# Patient Record
Sex: FEMALE | Race: WHITE | Employment: OTHER | URBAN - METROPOLITAN AREA
[De-identification: names, ages, dates, MRNs, and addresses within clinical notes are randomized per-mention and may not be internally consistent; named-entity substitution may affect disease eponyms.]

---

## 2017-01-11 ENCOUNTER — CLINICAL SUPPORT (OUTPATIENT)
Dept: CARDIOLOGY CLINIC | Age: 77
End: 2017-01-11

## 2017-01-11 VITALS
OXYGEN SATURATION: 98 % | HEART RATE: 87 BPM | BODY MASS INDEX: 31.54 KG/M2 | SYSTOLIC BLOOD PRESSURE: 120 MMHG | WEIGHT: 140.2 LBS | HEIGHT: 56 IN | DIASTOLIC BLOOD PRESSURE: 60 MMHG

## 2017-01-11 DIAGNOSIS — I10 ESSENTIAL HYPERTENSION: Primary | ICD-10-CM

## 2017-01-16 ENCOUNTER — TELEPHONE (OUTPATIENT)
Dept: CARDIOLOGY CLINIC | Age: 77
End: 2017-01-16

## 2017-01-16 NOTE — TELEPHONE ENCOUNTER
Pt states she seems to be ok off the atenolol. Says her BP is up and down. 150/78 one day and 120/60 the next. Says she feels a little de-energized but is unsure how that is related.

## 2017-01-16 NOTE — TELEPHONE ENCOUNTER
----- Message from Ju Bradley MD sent at 1/11/2017 12:47 PM EST -----  Regarding: bp  Genesis Avina - can you please call and see if she is feeling better off of atenolol. We stopped it a few weeks ago and wanted to see if she felt better off of it. Her BP is OK and we'll keep things as they currently are.      Thanks,  SK

## 2017-01-18 NOTE — TELEPHONE ENCOUNTER
Can you please have her keep a daily log of BP readings (morning and night) and associated symptoms and send me those results in one month.      Thanks,   SK

## 2017-02-01 ENCOUNTER — HOSPITAL ENCOUNTER (OUTPATIENT)
Dept: VASCULAR SURGERY | Age: 77
Discharge: HOME | End: 2017-02-01
Attending: FAMILY MEDICINE
Payer: MEDICARE

## 2017-02-01 DIAGNOSIS — I25.10 CAD (CORONARY ARTERY DISEASE): ICD-10-CM

## 2017-02-01 PROCEDURE — 93880 EXTRACRANIAL BILAT STUDY: CPT

## 2017-02-01 NOTE — PROCEDURES
[facility]  *** FINAL REPORT ***    Name: Myrna Webb  MRN: [id number]    Outpatient  : 1940  HIS Order #: [id number]  [address] [facility] Visit #: [id number]  Date: 2017    TYPE OF TEST: Cerebrovascular Duplex    REASON FOR TEST  General health evaluation, Family history of stroke    Right Carotid:-             Proximal               Mid                 Distal  cm/s  Systolic  Diastolic  Systolic  Diastolic  Systolic  Diastolic  CCA:    704.9      15.3                            73.3      16.3  Bulb:  ECA:    135.2       9.1  ICA:    113.4      15.0      103.5      22.8      129.1      26.8  ICA/CCA:  1.5       0.9    ICA Stenosis: <50%    Right Vertebral:-  Finding: Antegrade  Sys:       39.8  Rocio:       10.1    Right Subclavian:    Left Carotid:-            Proximal                Mid                 Distal  cm/s  Systolic  Diastolic  Systolic  Diastolic  Systolic  Diastolic  CCA:    199.4      20.4                            70.7      16.3  Bulb:  ECA:    123.4      13.0  ICA:    115.4      15.0       80.1      20.4       74.5      20.2  ICA/CCA:  1.6       0.9    ICA Stenosis: <50%    Left Vertebral:-  Finding: Antegrade  Sys:       46.1  Rocio:       15.0    Left Subclavian:    INTERPRETATION/FINDINGS  PROCEDURE:  Evaluation of the extracranial cerebrovascular arteries  with ultrasound (B-mode imaging, pulsed Doppler, color Doppler). Includes the common carotid, internal carotid, external carotid, and  vertebral arteries. FINDINGS: Intimal thickening observed in the CCA bilaterally. Calcific   plaque noted bilaterally in the bulb and the left distal CCA. Also,  heterogeneous plaque noted in the right distal CCA and ICA. Tortuous  ICA noted bilaterally. IMPRESSION: Findings are consistent with 0-49% stenosis of the right  internal carotid and 0-49% stenosis of the left internal carotid. Vertebrals are patent with antegrade flow.     ADDITIONAL COMMENTS    I have personally reviewed the data relevant to the interpretation of  this  study. TECHNOLOGIST: Gypsy Peter RDCS  Signed: 02/01/2017 05:32 PM    PHYSICIAN: Diogenes Rivera.  Karina Kilpatrick MD  Signed: 02/02/2017 08:24 AM

## 2017-03-02 ENCOUNTER — TELEPHONE (OUTPATIENT)
Dept: CARDIOLOGY CLINIC | Age: 77
End: 2017-03-02

## 2017-03-02 NOTE — TELEPHONE ENCOUNTER
----- Message from Teresa Goodman MD sent at 2/28/2017 10:48 PM EST -----  Regarding: please call  Can you please let patient know that home BP readings look good.    Thanks

## 2017-03-20 ENCOUNTER — OFFICE VISIT (OUTPATIENT)
Dept: CARDIOLOGY CLINIC | Age: 77
End: 2017-03-20

## 2017-03-20 VITALS
HEIGHT: 56 IN | SYSTOLIC BLOOD PRESSURE: 140 MMHG | BODY MASS INDEX: 31.27 KG/M2 | DIASTOLIC BLOOD PRESSURE: 82 MMHG | HEART RATE: 62 BPM | WEIGHT: 139 LBS

## 2017-03-20 DIAGNOSIS — E11.9 CONTROLLED TYPE 2 DIABETES MELLITUS WITHOUT COMPLICATION, WITHOUT LONG-TERM CURRENT USE OF INSULIN (HCC): ICD-10-CM

## 2017-03-20 DIAGNOSIS — I25.10 CORONARY ARTERY DISEASE INVOLVING NATIVE CORONARY ARTERY OF NATIVE HEART WITHOUT ANGINA PECTORIS: Primary | ICD-10-CM

## 2017-03-20 DIAGNOSIS — I10 ESSENTIAL HYPERTENSION: ICD-10-CM

## 2017-03-20 DIAGNOSIS — E78.5 HYPERLIPIDEMIA, UNSPECIFIED HYPERLIPIDEMIA TYPE: ICD-10-CM

## 2017-03-20 DIAGNOSIS — R53.82 CHRONIC FATIGUE: ICD-10-CM

## 2017-03-20 DIAGNOSIS — R73.03 BORDERLINE DIABETES MELLITUS: ICD-10-CM

## 2017-03-20 RX ORDER — CARVEDILOL 3.12 MG/1
3.12 TABLET ORAL 2 TIMES DAILY WITH MEALS
Qty: 60 TAB | Refills: 12 | Status: SHIPPED | OUTPATIENT
Start: 2017-03-20 | End: 2018-03-25 | Stop reason: SDUPTHER

## 2017-03-20 RX ORDER — ROSUVASTATIN CALCIUM 20 MG/1
20 TABLET, COATED ORAL
Qty: 30 TAB | Refills: 12 | Status: SHIPPED | OUTPATIENT
Start: 2017-03-20 | End: 2018-04-02 | Stop reason: SDUPTHER

## 2017-03-20 NOTE — PATIENT INSTRUCTIONS

## 2017-03-20 NOTE — MR AVS SNAPSHOT
Visit Information Date & Time Provider Department Dept. Phone Encounter #  
 3/20/2017  4:00 PM Celso Akers MD [facility] Sarah Pastor [phone] [id number] Your Appointments 4/4/2017  8:00 AM  
ECHO CARDIOGRAMS 2D with CHRIS WILLIAMSON  
[facility] ([facility]) Appt Note: 1 day nuc ht 4'10 wt 139 echo at 8am dr Jeanette Cardenas dx cp sll [address] [facility] [address] [facility]  
[address] [facility] [address]  
  
    
 4/4/2017  9:00 AM  
NUCLEAR MEDICINE with NUCLEAR [facility]  
[facility] ([facility] SCHEDULING) Appt Note: 1 day nuc ht 4'10 wt 139 echo at 8am dr Jeanette Cardenas dx cp sll [address] [facility] [address] [facility]  
[address] [address] [address] Upcoming Health Maintenance Date Due DTaP/Tdap/Td series (1 - Tdap) 7/4/1962 ZOSTER VACCINE AGE 60> 7/4/2001 GLAUCOMA SCREENING Q2Y 7/4/2006 OSTEOPOROSIS SCREENING (DEXA) 7/4/2006 Pneumococcal 65+ Low/Medium Risk (1 of 2 - PCV13) 7/4/2006 MEDICARE YEARLY EXAM 7/4/2006 INFLUENZA AGE 9 TO ADULT 8/1/2016 Allergies as of 3/20/2017  Review Complete On: 3/20/2017 By: Celso Akers MD  
  
 Severity Noted Reaction Type Reactions Darvon [Propoxyphene]  04/28/2014    Nausea Only Current Immunizations  Never Reviewed No immunizations on file. Not reviewed this visit You Were Diagnosed With   
  
 Codes Comments Coronary artery disease involving native coronary artery of native heart without angina pectoris    -  Primary ICD-10-CM: I25.10 ICD-9-CM: 414.01 Controlled type 2 diabetes mellitus without complication, without long-term current use of insulin ([facility]     ICD-10-CM: E11.9 ICD-9-CM: 250.00 Borderline diabetes mellitus     ICD-10-CM: R73.03 
ICD-9-CM: 790.29 Essential hypertension     ICD-10-CM: I10 
ICD-9-CM: 401.9 Hyperlipidemia, unspecified hyperlipidemia type     ICD-10-CM: E78.5 ICD-9-CM: 272.4 Chronic fatigue     ICD-10-CM: R53.82 
ICD-9-CM: 780.79 Vitals BP Pulse Height(growth percentile) Weight(growth percentile) BMI OB Status 140/82 62 4' 8\" (1.422 m) 139 lb (63 kg) 31.16 kg/m2 Hysterectomy Smoking Status Never Smoker Vitals History BMI and BSA Data Body Mass Index Body Surface Area  
 31.16 kg/m 2 1.58 m 2 Preferred Pharmacy Pharmacy Name Phone [id number] [place], [address] [phone] Your Updated Medication List  
  
   
This list is accurate as of: 3/20/17  4:56 PM.  Always use your most recent med list.  
  
  
  
  
 alendronate 70 mg tablet Commonly known as:  FOSAMAX Take  by mouth every Sunday. ALEVE 220 mg Cap Generic drug:  naproxen sodium Take  by mouth as needed. ALLEGRA 180 mg tablet Generic drug:  fexofenadine Take  by mouth daily. aspirin 81 mg chewable tablet Take 1 Tab by mouth daily. carvedilol 3.125 mg tablet Commonly known as:  Ozie Bitter Take 1 Tab by mouth two (2) times daily (with meals). CENTRUM SILVER Tab tablet Generic drug:  multivitamins-minerals-lutein Take 1 Tab by mouth daily. cyclobenzaprine 10 mg tablet Commonly known as:  FLEXERIL Take  by mouth as needed. loperamide 1 mg/5 mL solution Commonly known as:  IMODIUM Take  by mouth as needed for Diarrhea. NIFEdipine ER 90 mg ER tablet Commonly known as:  ADALAT CC Take 90 mg by mouth daily. rosuvastatin 20 mg tablet Commonly known as:  CRESTOR Take 1 Tab by mouth nightly. triamterene-hydroCHLOROthiazide 37.5-25 mg per capsule Commonly known as:  Marcina Majors Take  by mouth daily. VITAMIN D3 1,000 unit Cap Generic drug:  cholecalciferol Take  by mouth daily. Prescriptions Sent to Pharmacy Refills  
 rosuvastatin (CRESTOR) 20 mg tablet 12 Sig: Take 1 Tab by mouth nightly. Class: Normal  
 Pharmacy: [facility] [place] Ph #: [phone] Route: Oral  
 carvedilol (COREG) 3.125 mg tablet 12 Sig: Take 1 Tab by mouth two (2) times daily (with meals). Class: Normal  
 Pharmacy: [facility] [address] [place] Ph #: [phone] Route: Oral  
  
We Performed the Following HEMOGLOBIN A1C WITH EAG [47103 CPT(R)] METABOLIC PANEL, COMPREHENSIVE [17792 CPT(R)] NMR LIPOPROFILE [id number] CPT(R)] TSH 3RD GENERATION [20476 CPT(R)] Patient Instructions Heart-Healthy Diet: Care Instructions Your Care Instructions A heart-healthy diet has lots of vegetables, fruits, nuts, beans, and whole grains, and is low in salt. It limits foods that are high in saturated fat, such as meats, cheeses, and fried foods. It may be hard to change your diet, but even small changes can lower your risk of heart attack and heart disease. Follow-up care is a key part of your treatment and safety. Be sure to make and go to all appointments, and call your doctor if you are having problems. It's also a good idea to know your test results and keep a list of the medicines you take. How can you care for yourself at home? Watch your portions · Learn what a serving is. A \"serving\" and a \"portion\" are not always the same thing. Make sure that you are not eating larger portions than are recommended. For example, a serving of pasta is ½ cup. A serving size of meat is 2 to 3 ounces. A 3-ounce serving is about the size of a deck of cards. Measure serving sizes until you are good at [place]" them. Keep in mind that restaurants often serve portions that are 2 or 3 times the size of one serving.  
· To keep your energy level up and keep you from feeling hungry, eat often but in smaller portions. · Eat only the number of calories you need to stay at a healthy weight. If you need to lose weight, eat fewer calories than your body burns (through exercise and other physical activity). Eat more fruits and vegetables · Eat a variety of fruit and vegetables every day. Dark green, deep orange, red, or yellow fruits and vegetables are especially good for you. Examples include spinach, carrots, peaches, and berries. · Keep carrots, celery, and other veggies handy for snacks. Buy fruit that is in season and store it where you can see it so that you will be tempted to eat it. · Cook dishes that have a lot of veggies in them, such as stir-fries and soups. Limit saturated and trans fat · Read food labels, and try to avoid saturated and trans fats. They increase your risk of heart disease. Trans fat is found in many processed foods such as cookies and crackers. · Use olive or canola oil when you cook. Try cholesterol-lowering spreads, such as Benecol or Take Control. · Bake, broil, grill, or steam foods instead of frying them. · Choose lean meats instead of high-fat meats such as hot dogs and sausages. Cut off all visible fat when you prepare meat. · Eat fish, skinless poultry, and meat alternatives such as soy products instead of high-fat meats. Soy products, such as tofu, may be especially good for your heart. · Choose low-fat or fat-free milk and dairy products. Eat fish · Eat at least two servings of fish a week. Certain fish, such as salmon and tuna, contain omega-3 fatty acids, which may help reduce your risk of heart attack. Eat foods high in fiber · Eat a variety of grain products every day. Include whole-grain foods that have lots of fiber and nutrients. Examples of whole-grain foods include oats, whole wheat bread, and brown rice. · Buy whole-grain breads and cereals, instead of white bread or pastries. Limit salt and sodium · Limit how much salt and sodium you eat to help lower your blood pressure. · Taste food before you salt it. Add only a little salt when you think you need it. With time, your taste buds will adjust to less salt. · Eat fewer snack items, fast foods, and other high-salt, processed foods. Check food labels for the amount of sodium in packaged foods. · Choose low-sodium versions of canned goods (such as soups, vegetables, and beans). Limit sugar · Limit drinks and foods with added sugar. These include candy, desserts, and soda pop. Limit alcohol · Limit alcohol to no more than 2 drinks a day for men and 1 drink a day for women. Too much alcohol can cause health problems. When should you call for help? Watch closely for changes in your health, and be sure to contact your doctor if: 
· You would like help planning heart-healthy meals. Where can you learn more? Go to http://sandra-darion.info/. Enter V137 in the search box to learn more about \"Heart-Healthy Diet: Care Instructions. \" Current as of: January 27, 2016 Content Version: 11.1 © 5784-4944 [employer]. Care instructions adapted under license by [employer] (which disclaims liability or warranty for this information). If you have questions about a medical condition or this instruction, always ask your healthcare professional. [address] any warranty or liability for your use of this information. Introducing [facility] [place] [facility]! Dear Esme Perez: Thank you for requesting a [employer] account. Our records indicate that you already have an active [employer] account. You can access your account anytime at [email or web address] Beyond [email or web address]/[employer] Did you know that you can access your hospital and ER discharge instructions at any time in [employer]? You can also review all of your test results from your hospital stay or ER visit. Additional Information If you have questions, please visit the Frequently Asked Questions section of the [employer] website at [email or web address] ([email or web address] Remember, [employer] is NOT to be used for urgent needs. For medical emergencies, dial 911. Now available from your iPhone and Android! Please provide this summary of care documentation to your next provider. Your primary care clinician is listed as Josie Bland. If you have any questions after today's visit, please call [phone].

## 2017-03-20 NOTE — PROGRESS NOTES
Foreign Bullard MD. [facility] [place]              Patient: Guillaume Agrawal  : 1940      Today's Date: 3/20/2017          HISTORY OF PRESENT ILLNESS:     History of Present Illness:  Ms. Clifton Banuelos is here for follow-up. She says energy level is not good - difficult to do much in the day. Has had some dull feeling substernal past few weeks - just there all day long past few weeks - annoying feeling - not any worse walking. Has HIGHTOWER going up stairs. PAST MEDICAL HISTORY:     Past Medical History:   Diagnosis Date    Arthritis     Borderline diabetes mellitus     CAD (coronary artery disease)     CT Heart Scan 14 - CAC Score 534, (80th%)    Diverticulosis     Dyslipidemia     GERD (gastroesophageal reflux disease)     HTN (hypertension)     Obesity     S/P hip replacement     Sleep apnea     on CPAP         Past Surgical History:   Procedure Laterality Date    HX KNEE REPLACEMENT      left, 2013    HX OTHER SURGICAL      CT Heart Scan 14 - CAC Score 534, (80th%). Prominent lymph nodes.  HX OTHER SURGICAL      Exercise cardiolite (14) - walked 3:15 (4.6 METS) - no ischemic EKG changes. Normal MPI (+ breast attenuation). LVEF 70%. MEDICATIONS:     Current Outpatient Prescriptions   Medication Sig Dispense Refill    pravastatin (PRAVACHOL) 40 mg tablet Take 1 Tab by mouth nightly. 30 Tab 12    cyclobenzaprine (FLEXERIL) 10 mg tablet Take  by mouth as needed.  alendronate (FOSAMAX) 70 mg tablet Take  by mouth every .  naproxen sodium (ALEVE) 220 mg cap Take  by mouth as needed.  multivitamins-minerals-lutein (CENTRUM SILVER) tab tablet Take 1 Tab by mouth daily.  loperamide (IMODIUM) 1 mg/5 mL solution Take  by mouth as needed for Diarrhea.  Cholecalciferol, Vitamin D3, (VITAMIN D3) 1,000 unit cap Take  by mouth daily.  NIFEdipine ER (ADALAT CC) 90 mg ER tablet Take 90 mg by mouth daily.       fexofenadine (ALLEGRA) 180 mg tablet Take  by mouth daily.  triamterene-hydrochlorothiazide (DYAZIDE) 37.5-25 mg per capsule Take  by mouth daily.  aspirin 81 mg chewable tablet Take 1 Tab by mouth daily. 30 Tab 11       Allergies   Allergen Reactions    Darvon [Propoxyphene] Nausea Only             SOCIAL HISTORY:     Social History   Substance Use Topics    Smoking status: Never Smoker    Smokeless tobacco: None    Alcohol use Yes      Comment: 1 glass of wine daily            FAMILY HISTORY:     Family History   Problem Relation Age of Onset    Stroke Mother     COPD Father              REVIEW OF SYSTEMS:        Review of Systems:    Constitutional: Negative for fever, chills    HEENT: Negative for vision changes.    Respiratory: Negative for cough    Cardiovascular: Negative for orthopnea, syncope, and PND.    Gastrointestinal: Negative for abdominal pain or melena    Genitourinary: Negative for dysuria    Musculoskeletal: Negative for myalgias.    Skin: Negative for rash    Heme: No problems bleeding.    Neurological: Negative for speech change and focal weakness.    occ diarrhea   + night sweats                     PHYSICAL EXAM:      Physical Exam:  Visit Vitals    /82    Pulse 62    Ht 4' 8\" (1.422 m)    Wt 139 lb (63 kg)    BMI 31.16 kg/m2       Patient appears generally well, mood and affect are appropriate and pleasant. HEENT: Hearing intact, non-icteric, normocephalic, atraumatic. Neck Exam: Supple, No JVD or carotid bruits. Lung Exam: Clear to auscultation, even breath sounds. Cardiac Exam: Regular rate and rhythm with no murmur  Abdomen: Soft, non-tender, normal bowel sounds. No bruits or masses. Extremities: Moves all ext well. No lower extremity edema. Vascular: 2+ dorsalis pedis pulses bilaterally.   Psych: Appropriate affect  Neuro - Grossly intact             LABS / OTHER STUDIES:        Labs 6/29/15 - chol 121, LDL 58, , HDL 43, LDL-P 1045, ApoB 54, CRP 4.6, proBNP 48, Insulin 22, glc 107, A1c 6.5, Cr 0.8, K 4.2, LFT's OK, ASA works 460  Labs 12/16/15 - chol 122, LDL 57, HDL 50, TG 85, Apo B 51, LDL-P 794, CRP 3.5, proBNP 44, A1c 6.1, CMP OK  Labs 7/27/16 - CMP OK (glc 143), TSH 2.1, A1c 6.5, CBC OK, chol 155, HDL 47, LDL 79,   Labs 1/31/17 - CBC OK, TSH 2.2,     Lab Results   Component Value Date/Time    Sodium 142 03/06/2017 09:06 AM    Potassium 3.8 03/06/2017 09:06 AM    Chloride 99 03/06/2017 09:06 AM    CO2 26 03/06/2017 09:06 AM    Anion gap 12 12/15/2015 09:20 AM    Glucose 130 03/06/2017 09:06 AM    BUN 18 03/06/2017 09:06 AM    Creatinine 0.75 03/06/2017 09:06 AM    BUN/Creatinine ratio 24 03/06/2017 09:06 AM    GFR est AA 90 03/06/2017 09:06 AM    GFR est non-AA 78 03/06/2017 09:06 AM    Calcium 9.7 03/06/2017 09:06 AM    Bilirubin, total 0.7 03/06/2017 09:06 AM    AST (SGOT) 15 03/06/2017 09:06 AM    Alk. phosphatase 68 03/06/2017 09:06 AM    Protein, total 7.3 03/06/2017 09:06 AM    Albumin 4.7 03/06/2017 09:06 AM    A-G Ratio 1.8 03/06/2017 09:06 AM    ALT (SGPT) 20 03/06/2017 09:06 AM     Component      Latest Ref Rng & Units 3/6/2017 12/16/2016           9:06 AM  8:26 AM   LDL-P      <1000 nmol/L 1130 (H) 1216 (H)   LDL-C      0 - 99 mg/dL 80 83   HDL-C      >39 mg/dL 60 49   Triglycerides      0 - 149 mg/dL 139 123   Cholesterol, total      100 - 199 mg/dL 168 157   HDL-P (Total)      >=30.5 umol/L 39.0 35.4   Small LDL-P      <=527 nmol/L 603 (H) 837 (H)   LDL size      >20.5 nm 20.5 20.0   LP-IR SCORE      <=45 70 (H) 74 (H)           CARDIAC DIAGNOSTICS:      Cardiac Evaluation Includes:  EKG 4/16/14 - NSR, normal    EKG 6/29/15 - NSR, normal   EKG 12/21/16 - NSR, normal           Carotid Dopplers 2/1/17 - mild plaque - 0-49% stenosis bilat           ASSESSMENT AND PLAN:        Assessment and Plan:    1) CAD    - CT Heart Scan 4/17/14 shows CAC Score 534, (80th%)    - Exercise cardiolite (5/19/14) - walked 3:15 (4.6 METS) - no ischemic EKG changes.  Normal MPI (+ breast attenuation). LVEF 70%.    - Ms. Kamaljit Driscoll has denies chest pain.    - For prevention of CV events, continue ASA 81.   - work on a healthy diet and weight loss   - given chest pain and fatigue, recheck an exercise cardiolite (walking limited by back and hip problems so likely will need to switch to [facility]) and echo       2) SHEREE on CPAP      3) Dyslipidemia -    - prior LDL-P and ApoB were OK on atorvastatin 20 mg. Was switched due pravastatin due to [place] [facility] issues\"   - LFT's OK lately   - LDL-P borderline higher than goal ---> Switch to Crestor 20 mg daily       4) HTN  - BP borderline high at home  - she feels tired so stopped atenolol, but this did not help any.   - will start Coreg (start at 3.125 mg BID and titrate up)      5) Pre-Diabetes  - Hgb A1c was 6.5 on 6/4/15  - A1c 6.1 on 12/17/15  - A1c 6.7 recently - management per PCP       6) RTC in 6 weeks. Patient expressed understanding of the plan - questions were answered.          Ami Mims MD, [place] [address] [place] [address], [address]  [address], [address] [place] [address]  Ph: [phone]   Ph [phone]                ADDENDUM   4/4/2017  Exercise Cardiolite 4/4/17 - walked 3:16 (4.6 METS). No CP or ischemic EKG changes. Normal MPI. LVEF 69%  Echo 4/4/17 - mild LVH. LVEF 55-60%    Will call patient with essentially normal results. ADDENDUM   4/5/2017  I called patient with results. She is doing well she says.

## 2017-04-03 ENCOUNTER — TELEPHONE (OUTPATIENT)
Dept: CARDIOLOGY CLINIC | Age: 77
End: 2017-04-03

## 2017-04-03 NOTE — TELEPHONE ENCOUNTER
LM to call back [phone]. WHen pt calls back please tell her  8am Nuclear stress test on 4/4/17, 8am echo. Informed pt no caffiene after 8pm  on 4/3/17, NPO [id number] on 4/4/17, and DO NOT take coreg tonight or in morning. Encouraged pt to wear comfortable clothes. Tests last 4-5 hours.

## 2017-04-04 ENCOUNTER — CLINICAL SUPPORT (OUTPATIENT)
Dept: CARDIOLOGY CLINIC | Age: 77
End: 2017-04-04

## 2017-04-04 DIAGNOSIS — I25.10 CORONARY ARTERY DISEASE INVOLVING NATIVE CORONARY ARTERY OF NATIVE HEART WITHOUT ANGINA PECTORIS: Primary | ICD-10-CM

## 2017-04-04 DIAGNOSIS — I10 ESSENTIAL HYPERTENSION: ICD-10-CM

## 2017-04-04 DIAGNOSIS — E78.5 HYPERLIPIDEMIA, UNSPECIFIED HYPERLIPIDEMIA TYPE: ICD-10-CM

## 2017-04-04 DIAGNOSIS — I25.10 CORONARY ARTERY DISEASE INVOLVING NATIVE CORONARY ARTERY OF NATIVE HEART WITHOUT ANGINA PECTORIS: ICD-10-CM

## 2017-04-04 NOTE — PROGRESS NOTES
See scanned report.  ordered and Dr. Yeison Palma read study. ID verified per protocol. Pt  reported no symptoms at completion of protocol.

## 2017-04-19 ENCOUNTER — HOSPITAL ENCOUNTER (OUTPATIENT)
Dept: LAB | Age: 77
Discharge: HOME | End: 2017-04-19
Payer: MEDICARE

## 2017-04-19 PROCEDURE — 80053 COMPREHEN METABOLIC PANEL: CPT

## 2017-04-19 PROCEDURE — 36415 COLL VENOUS BLD VENIPUNCTURE: CPT

## 2017-04-19 PROCEDURE — 83704 LIPOPROTEIN BLD QUAN PART: CPT

## 2017-04-19 PROCEDURE — 84443 ASSAY THYROID STIM HORMONE: CPT

## 2017-04-19 PROCEDURE — 83036 HEMOGLOBIN GLYCOSYLATED A1C: CPT

## 2017-04-21 LAB
ALBUMIN SERPL-MCNC: 4.1 G/DL (ref 3.5–4.8)
ALBUMIN/GLOB SERPL: 1.6 {RATIO} (ref 1.2–2.2)
ALP SERPL-CCNC: 59 IU/L (ref 39–117)
ALT SERPL-CCNC: 18 IU/L (ref 0–32)
AST SERPL-CCNC: 15 IU/L (ref 0–40)
BILIRUB SERPL-MCNC: 0.5 MG/DL (ref 0–1.2)
BUN SERPL-MCNC: 18 MG/DL (ref 8–27)
BUN/CREAT SERPL: 20 (ref 12–28)
CALCIUM SERPL-MCNC: 9.3 MG/DL (ref 8.7–10.3)
CHLORIDE SERPL-SCNC: 98 MMOL/L (ref 96–106)
CHOLEST SERPL-MCNC: 107 MG/DL (ref 100–199)
CO2 SERPL-SCNC: 28 MMOL/L (ref 18–29)
CREAT SERPL-MCNC: 0.9 MG/DL (ref 0.57–1)
EST. AVERAGE GLUCOSE BLD GHB EST-MCNC: 151 MG/DL
GLOBULIN SER CALC-MCNC: 2.5 G/DL (ref 1.5–4.5)
GLUCOSE SERPL-MCNC: 123 MG/DL (ref 65–99)
HBA1C MFR BLD: 6.9 % (ref 4.8–5.6)
HDL SERPL-SCNC: 36.4 UMOL/L
HDLC SERPL-MCNC: 49 MG/DL
LDL SERPL QN: 20.1 NM
LDL SERPL-SCNC: 664 NMOL/L
LDL SMALL SERPL-SCNC: 452 NMOL/L
LDLC SERPL CALC-MCNC: 38 MG/DL (ref 0–99)
LP-IR SCORE SERPL: 64
POTASSIUM SERPL-SCNC: 4.2 MMOL/L (ref 3.5–5.2)
PROT SERPL-MCNC: 6.6 G/DL (ref 6–8.5)
SODIUM SERPL-SCNC: 140 MMOL/L (ref 134–144)
TRIGL SERPL-MCNC: 102 MG/DL (ref 0–149)
TSH SERPL DL<=0.005 MIU/L-ACNC: 2.18 UIU/ML (ref 0.45–4.5)

## 2017-05-01 ENCOUNTER — OFFICE VISIT (OUTPATIENT)
Dept: CARDIOLOGY CLINIC | Age: 77
End: 2017-05-01

## 2017-05-01 VITALS
BODY MASS INDEX: 31.94 KG/M2 | SYSTOLIC BLOOD PRESSURE: 130 MMHG | WEIGHT: 142 LBS | DIASTOLIC BLOOD PRESSURE: 58 MMHG | HEART RATE: 76 BPM | HEIGHT: 56 IN

## 2017-05-01 DIAGNOSIS — I25.10 CORONARY ARTERY DISEASE INVOLVING NATIVE CORONARY ARTERY OF NATIVE HEART WITHOUT ANGINA PECTORIS: Primary | ICD-10-CM

## 2017-05-01 DIAGNOSIS — I10 ESSENTIAL HYPERTENSION: ICD-10-CM

## 2017-05-01 DIAGNOSIS — E78.5 HYPERLIPIDEMIA, UNSPECIFIED HYPERLIPIDEMIA TYPE: ICD-10-CM

## 2017-05-01 NOTE — PROGRESS NOTES
Visit Vitals    /58    Pulse 76    Ht 4' 8\" (1.422 m)    Wt 142 lb (64.4 kg)    BMI 31.84 kg/m2     Medication changes for this ov VO Dr. Chasity Mendes

## 2017-05-01 NOTE — PROGRESS NOTES
Kelli Nair MD. [facility] [place]              Patient: Leeann Juarez  : 1940      Today's Date: 2017            HISTORY OF PRESENT ILLNESS:     History of Present Illness:  Ms. Carleen Gutiérrez is here for follow-up. Has some atypical chest pain. Every once in a while has a sharp, brief pain (< 1 sec). She exercises at [facility] 3 days a week. PAST MEDICAL HISTORY:     Past Medical History:   Diagnosis Date    Arthritis     Borderline diabetes mellitus     CAD (coronary artery disease)     CT Heart Scan 14 - CAC Score 534, (80th%)    Diverticulosis     Dyslipidemia     GERD (gastroesophageal reflux disease)     HTN (hypertension)     Obesity     S/P hip replacement     S/P knee replacement     Sleep apnea     on CPAP         Past Surgical History:   Procedure Laterality Date    HX KNEE REPLACEMENT      left, 2013    HX OTHER SURGICAL      CT Heart Scan 14 - CAC Score 534, (80th%). Prominent lymph nodes.  HX OTHER SURGICAL      Exercise cardiolite (14) - walked 3:15 (4.6 METS) - no ischemic EKG changes. Normal MPI (+ breast attenuation). LVEF 70%. MEDICATIONS:     Current Outpatient Prescriptions   Medication Sig Dispense Refill    rosuvastatin (CRESTOR) 20 mg tablet Take 1 Tab by mouth nightly. 30 Tab 12    carvedilol (COREG) 3.125 mg tablet Take 1 Tab by mouth two (2) times daily (with meals). 60 Tab 12    cyclobenzaprine (FLEXERIL) 10 mg tablet Take  by mouth as needed.  alendronate (FOSAMAX) 70 mg tablet Take  by mouth every .  naproxen sodium (ALEVE) 220 mg cap Take  by mouth as needed.  multivitamins-minerals-lutein (CENTRUM SILVER) tab tablet Take 1 Tab by mouth daily.  loperamide (IMODIUM) 1 mg/5 mL solution Take  by mouth as needed for Diarrhea.  Cholecalciferol, Vitamin D3, (VITAMIN D3) 1,000 unit cap Take  by mouth daily.  NIFEdipine ER (ADALAT CC) 90 mg ER tablet Take 90 mg by mouth daily.       fexofenadine (ALLEGRA) 180 mg tablet Take  by mouth daily.  triamterene-hydrochlorothiazide (DYAZIDE) 37.5-25 mg per capsule Take  by mouth daily.  aspirin 81 mg chewable tablet Take 1 Tab by mouth daily. 30 Tab 11       Allergies   Allergen Reactions    Darvon [Propoxyphene] Nausea Only             SOCIAL HISTORY:     Social History   Substance Use Topics    Smoking status: Never Smoker    Smokeless tobacco: None    Alcohol use Yes      Comment: 1 glass of wine daily          FAMILY HISTORY:     Family History   Problem Relation Age of Onset    Stroke Mother     COPD Father              REVIEW OF SYSTEMS:        Review of Systems:    Constitutional: Negative for fever, chills    HEENT: Negative for vision changes.    Respiratory: Negative for cough    Cardiovascular: Negative for orthopnea, syncope, and PND.    Gastrointestinal: Negative for abdominal pain or melena    Genitourinary: Negative for dysuria    Musculoskeletal: Negative for myalgias.    Skin: Negative for rash    Heme: No problems bleeding.    Neurological: Negative for speech change and focal weakness.    occ diarrhea   + night sweats                       PHYSICAL EXAM:       Physical Exam:  Visit Vitals    /58    Pulse 76    Ht 4' 8\" (1.422 m)    Wt 142 lb (64.4 kg)    BMI 31.84 kg/m2        Patient appears generally well, mood and affect are appropriate and pleasant. HEENT: Hearing intact, non-icteric, normocephalic, atraumatic. Neck Exam: Supple, No JVD or carotid bruits. Lung Exam: Clear to auscultation, even breath sounds. Cardiac Exam: Regular rate and rhythm with no murmur  Abdomen: Soft, non-tender, normal bowel sounds. No bruits or masses. Extremities: Moves all ext well. No lower extremity edema. Vascular: 2+ dorsalis pedis pulses bilaterally.   Psych: Appropriate affect  Neuro - Grossly intact              LABS / OTHER STUDIES:        Labs 6/29/15 - chol 121, LDL 58, , HDL 43, LDL-P 1045, ApoB 54, CRP 4.6, proBNP 48, Insulin 22, glc 107, A1c 6.5, Cr 0.8, K 4.2, LFT's OK, ASA works 460  Labs 12/16/15 - chol 122, LDL 57, HDL 50, TG 85, Apo B 51, LDL-P 794, CRP 3.5, proBNP 44, A1c 6.1, CMP OK  Labs 7/27/16 - CMP OK (glc 143), TSH 2.1, A1c 6.5, CBC OK, chol 155, HDL 47, LDL 79,   Labs 1/31/17 - CBC OK, TSH 2.2,            Lab Results   Component Value Date/Time     Sodium 142 03/06/2017 09:06 AM     Potassium 3.8 03/06/2017 09:06 AM     Chloride 99 03/06/2017 09:06 AM     CO2 26 03/06/2017 09:06 AM     Anion gap 12 12/15/2015 09:20 AM     Glucose 130 03/06/2017 09:06 AM     BUN 18 03/06/2017 09:06 AM     Creatinine 0.75 03/06/2017 09:06 AM     BUN/Creatinine ratio 24 03/06/2017 09:06 AM     GFR est AA 90 03/06/2017 09:06 AM     GFR est non-AA 78 03/06/2017 09:06 AM     Calcium 9.7 03/06/2017 09:06 AM     Bilirubin, total 0.7 03/06/2017 09:06 AM     AST (SGOT) 15 03/06/2017 09:06 AM     Alk.  phosphatase 68 03/06/2017 09:06 AM     Protein, total 7.3 03/06/2017 09:06 AM     Albumin 4.7 03/06/2017 09:06 AM     A-G Ratio 1.8 03/06/2017 09:06 AM     ALT (SGPT) 20 03/06/2017 09:06 AM      Component  Latest Ref Rng & Units 3/6/2017 12/16/2016      9:06 AM 8:26 AM   LDL-P  <1000 nmol/L 1130 (H) 1216 (H)   LDL-C  0 - 99 mg/dL 80 83   HDL-C  >39 mg/dL 60 49   Triglycerides  0 - 149 mg/dL 139 123   Cholesterol, total  100 - 199 mg/dL 168 157   HDL-P (Total)  >=30.5 umol/L 39.0 35.4   Small LDL-P  <=527 nmol/L 603 (H) 837 (H)   LDL size  >20.5 nm 20.5 20.0   LP-IR SCORE  <=45 70 (H) 74 (H)          Component      Latest Ref Rng & Units 4/19/2017 4/19/2017 4/19/2017           8:25 AM  8:25 AM  8:25 AM   LDL-P      <1000 nmol/L   664   LDL-C      0 - 99 mg/dL   38   HDL-C      >39 mg/dL   49   Triglycerides      0 - 149 mg/dL   102   Cholesterol, total      100 - 199 mg/dL   107   HDL-P (Total)      >=30.5 umol/L   36.4   Small LDL-P      <=527 nmol/L   452   LDL size      >20.5 nm   20.1   LP-IR SCORE      <=45   64 (H) Hemoglobin A1c, (calculated)      4.8 - 5.6 % 6.9 (H)     Estimated average glucose      mg/dL 151     TSH      0.450 - 4.500 uIU/mL  2.180            CARDIAC DIAGNOSTICS:       Cardiac Evaluation Includes:  EKG 4/16/14 - NSR, normal    EKG 6/29/15 - NSR, normal   EKG 12/21/16 - NSR, normal           Carotid Dopplers 2/1/17 - mild plaque - 0-49% stenosis bilat   Exercise Cardiolite 4/4/17 - walked 3:16 (4.6 METS). No CP or ischemic EKG changes. Normal MPI. LVEF 69%  Echo 4/4/17 - mild LVH. LVEF 55-60%            ASSESSMENT AND PLAN:        Assessment and Plan:    1) CAD    - CT Heart Scan 4/17/14 shows CAC Score 534, (80th%)    - Exercise cardiolite (5/19/14) - walked 3:15 (4.6 METS) - no ischemic EKG changes. Normal MPI (+ breast attenuation). LVEF 70%.    - Exercise Cardiolite 4/4/17 - walked 3:16 (4.6 METS). No CP or ischemic EKG changes. Normal MPI. LVEF 69%  - Ms. Cheng Avitia has atypical chest pain. - For prevention of CV events, continue ASA 81 and statin . - work on a healthy diet and weight loss       2) SHEREE on CPAP      3) Dyslipidemia -    - prior LDL-P and ApoB were OK on atorvastatin 20 mg. Was switched due pravastatin due to \"liver issues\". Switched to crestor for higher potency. - LFT's OK lately  - Recent lipids look good (see above). She is tolerating Crestor.        4) HTN  - she feels tired so stopped atenolol, but this did not help any.   - started Coreg which she is tolerating  - BP looks good on current regimen      5) Diabetes  - Hgb A1c was 6.5 on 6/4/15  - A1c 6.1 on 12/17/15  - A1c 6.9 recently - management per PCP       6) Fatigue  - I don't see a cardiac explanation for symptoms  - Despite making medicine changes (stopping atenolol and switching statin) her symptoms persist  - I asked her to continue to exercise and eat healthy   - If symptoms worsen, will reassess from cardiac standpoint     7) RTC in 6 months. Patient expressed understanding of the plan - questions were answered. She exercises at [facility] 3 days a week.        Ne Robles MD, [facility]  [address] [facility], [address]  [address]  [address], [address] [place], [address] [place] [address]  Ph: [phone] Ph [phone]  Ravi Rosales

## 2017-05-01 NOTE — MR AVS SNAPSHOT
Visit Information Date & Time Provider Department Dept. Phone Encounter #  
 5/1/2017  3:00 PM Momo Rizzo MD [facility] Sammie Joshua [phone] [id number] Upcoming Health Maintenance Date Due DTaP/Tdap/Td series (1 - Tdap) 7/4/1962 ZOSTER VACCINE AGE 60> 7/4/2001 GLAUCOMA SCREENING Q2Y 7/4/2006 OSTEOPOROSIS SCREENING (DEXA) 7/4/2006 Pneumococcal 65+ Low/Medium Risk (1 of 2 - PCV13) 7/4/2006 MEDICARE YEARLY EXAM 7/4/2006 INFLUENZA AGE 9 TO ADULT 8/1/2017 Allergies as of 5/1/2017  Review Complete On: 5/1/2017 By: Momo Rizzo MD  
  
 Severity Noted Reaction Type Reactions Darvon [Propoxyphene]  04/28/2014    Nausea Only Current Immunizations  Never Reviewed No immunizations on file. Not reviewed this visit Vitals BP Pulse Height(growth percentile) Weight(growth percentile) BMI OB Status 130/58 76 4' 8\" (1.422 m) 142 lb (64.4 kg) 31.84 kg/m2 Hysterectomy Smoking Status Never Smoker Vitals History BMI and BSA Data Body Mass Index Body Surface Area  
 31.84 kg/m 2 1.6 m 2 Preferred Pharmacy Pharmacy Name Phone [facility] [address] [place], [address] [phone] Your Updated Medication List  
  
   
This list is accurate as of: 5/1/17  3:37 PM.  Always use your most recent med list.  
  
  
  
  
 alendronate 70 mg tablet Commonly known as:  FOSAMAX Take  by mouth every Sunday. ALEVE 220 mg Cap Generic drug:  naproxen sodium Take  by mouth as needed. ALLEGRA 180 mg tablet Generic drug:  fexofenadine Take  by mouth daily. aspirin 81 mg chewable tablet Take 1 Tab by mouth daily. carvedilol 3.125 mg tablet Commonly known as:  Rex Pulse Take 1 Tab by mouth two (2) times daily (with meals). CENTRUM SILVER Tab tablet Generic drug:  multivitamins-minerals-lutein Take 1 Tab by mouth daily. cyclobenzaprine 10 mg tablet Commonly known as:  FLEXERIL Take  by mouth as needed. loperamide 1 mg/5 mL solution Commonly known as:  IMODIUM Take  by mouth as needed for Diarrhea. NIFEdipine ER 90 mg ER tablet Commonly known as:  ADALAT CC Take 90 mg by mouth daily. rosuvastatin 20 mg tablet Commonly known as:  CRESTOR Take 1 Tab by mouth nightly. triamterene-hydroCHLOROthiazide 37.5-25 mg per capsule Commonly known as:  Margretta Pierson Take  by mouth daily. VITAMIN D3 1,000 unit Cap Generic drug:  cholecalciferol Take  by mouth daily. Introducing [facility]! Dear Nakul Lindsey: Thank you for requesting a FRESS account. Our records indicate that you have previously registered for a FRESS account but its currently inactive. Please call our FRESS support line at [phone]. Additional Information If you have questions, please visit the Frequently Asked Questions section of the FRESS website at [email or web address] Remember, FRESS is NOT to be used for urgent needs. For medical emergencies, dial 911. Now available from your iPhone and Android! Please provide this summary of care documentation to your next provider. Your primary care clinician is listed as Moira Plaza. If you have any questions after today's visit, please call [phone].

## 2017-11-27 ENCOUNTER — OFFICE VISIT (OUTPATIENT)
Dept: CARDIOLOGY CLINIC | Age: 77
End: 2017-11-27

## 2017-11-27 VITALS
BODY MASS INDEX: 30.49 KG/M2 | DIASTOLIC BLOOD PRESSURE: 72 MMHG | SYSTOLIC BLOOD PRESSURE: 130 MMHG | WEIGHT: 136 LBS | OXYGEN SATURATION: 97 % | HEART RATE: 81 BPM

## 2017-11-27 DIAGNOSIS — E78.5 HYPERLIPIDEMIA, UNSPECIFIED HYPERLIPIDEMIA TYPE: ICD-10-CM

## 2017-11-27 DIAGNOSIS — I10 ESSENTIAL HYPERTENSION: ICD-10-CM

## 2017-11-27 DIAGNOSIS — I25.10 CORONARY ARTERY DISEASE INVOLVING NATIVE CORONARY ARTERY OF NATIVE HEART WITHOUT ANGINA PECTORIS: Primary | ICD-10-CM

## 2017-11-27 RX ORDER — LATANOPROST 50 UG/ML
1 SOLUTION/ DROPS OPHTHALMIC
COMMUNITY
End: 2022-09-30

## 2017-11-27 NOTE — MR AVS SNAPSHOT
Visit Information Date & Time Provider Department Dept. Phone Encounter #  
 11/27/2017  1:00 PM Citlalli Mistry MD [facility] Federico Alvarado [phone] [id number] Upcoming Health Maintenance Date Due DTaP/Tdap/Td series (1 - Tdap) 7/4/1962 ZOSTER VACCINE AGE 60> 5/4/2001 GLAUCOMA SCREENING Q2Y 7/4/2006 OSTEOPOROSIS SCREENING (DEXA) 7/4/2006 Pneumococcal 65+ Low/Medium Risk (1 of 2 - PCV13) 7/4/2006 MEDICARE YEARLY EXAM 7/4/2006 Influenza Age 5 to Adult 8/1/2017 Allergies as of 11/27/2017  Review Complete On: 11/27/2017 By: Citlalli Mistry MD  
  
 Severity Noted Reaction Type Reactions Animal Dander  11/27/2017    Other (comments) Cats. Stuffy nose Darvon [Propoxyphene]  04/28/2014    Nausea Only Hay Fever And Allergy Relief  11/27/2017    Cough, Sneezing Mold  11/27/2017    Other (comments) Stuff nose Current Immunizations  Never Reviewed No immunizations on file. Not reviewed this visit You Were Diagnosed With   
  
 Codes Comments Coronary artery disease involving native coronary artery of native heart without angina pectoris    -  Primary ICD-10-CM: I25.10 ICD-9-CM: 414.01 Essential hypertension     ICD-10-CM: I10 
ICD-9-CM: 401.9 Hyperlipidemia, unspecified hyperlipidemia type     ICD-10-CM: E78.5 ICD-9-CM: 272.4 Vitals BP Pulse Weight(growth percentile) SpO2 BMI OB Status 130/72 (BP 1 Location: Right arm) 81 136 lb (61.7 kg) 97% 30.49 kg/m2 Hysterectomy Smoking Status Never Smoker Vitals History BMI and BSA Data Body Mass Index Body Surface Area  
 30.49 kg/m 2 1.56 m 2 Preferred Pharmacy Pharmacy Name Phone [facility] [place], [address] [phone] Your Updated Medication List  
  
   
This list is accurate as of: 11/27/17  1:52 PM.  Always use your most recent med list.  
  
  
  
  
 alendronate 70 mg tablet Commonly known as:  FOSAMAX Take  by mouth every Sunday. ALEVE 220 mg Cap Generic drug:  naproxen sodium Take  by mouth as needed. ALLEGRA 180 mg tablet Generic drug:  fexofenadine Take  by mouth daily as needed. aspirin 81 mg chewable tablet Take 1 Tab by mouth daily. carvedilol 3.125 mg tablet Commonly known as:  Layman Sims Take 1 Tab by mouth two (2) times daily (with meals). CENTRUM SILVER Tab tablet Generic drug:  multivitamins-minerals-lutein Take 1 Tab by mouth daily. cyclobenzaprine 10 mg tablet Commonly known as:  FLEXERIL Take  by mouth as needed. latanoprost 0.005 % ophthalmic solution Commonly known as:  Kenny Pry Administer 1 Drop to both eyes nightly. loperamide 1 mg/5 mL solution Commonly known as:  IMODIUM Take  by mouth as needed for Diarrhea. NIFEdipine ER 90 mg ER tablet Commonly known as:  ADALAT CC Take 90 mg by mouth daily. OTHER Apply  to affected area as needed. Indications: aspercream  
  
 rosuvastatin 20 mg tablet Commonly known as:  CRESTOR Take 1 Tab by mouth nightly. triamterene-hydroCHLOROthiazide 37.5-25 mg per capsule Commonly known as:  Carlo Makos Take  by mouth daily. Indications: 0.5 tablet VITAMIN D3 1,000 unit Cap Generic drug:  cholecalciferol Take  by mouth daily. We Performed the Following AMB POC EKG ROUTINE W/ 12 LEADS, INTER & REP [96972 CPT(R)] Introducing [facility] & HEALTH SERVICES! Dear Luis Stone: Thank you for requesting a [employer] account. Our records indicate that you have previously registered for a [employer] account but its currently inactive. Please call our [employer] support line at [phone]. Additional Information If you have questions, please visit the Frequently Asked Questions section of the [employer] website at [email or web address] Remember, [employer] is NOT to be used for urgent needs. For medical emergencies, dial 911. Now available from your iPhone and Android! Please provide this summary of care documentation to your next provider. Your primary care clinician is listed as Miryam Burrell. If you have any questions after today's visit, please call [phone].

## 2017-11-27 NOTE — PROGRESS NOTES
Jewels Medina MD. [facility] [place]              Patient: Lia Dewitt  : 1940      Today's Date: 2017            HISTORY OF PRESENT ILLNESS:     History of Present Illness:  Ms. Haris Enriquez is here for follow-up. Is tired, but otherwise OK. No CP or SOB. No dizziness. PAST MEDICAL HISTORY:     Past Medical History:   Diagnosis Date    Arthritis     CAD (coronary artery disease)     CT Heart Scan 14 - CAC Score 534, (80th%)    Diabetes mellitus ([address]     Diverticulosis     Dyslipidemia     GERD (gastroesophageal reflux disease)     HTN (hypertension)     Obesity     S/P hip replacement     S/P knee replacement     Sleep apnea     on CPAP         Past Surgical History:   Procedure Laterality Date    HX KNEE REPLACEMENT      left, 2013    HX OTHER SURGICAL      CT Heart Scan 14 - CAC Score 534, (80th%). Prominent lymph nodes.  HX OTHER SURGICAL      Exercise cardiolite (14) - walked 3:15 (4.6 METS) - no ischemic EKG changes. Normal MPI (+ breast attenuation). LVEF 70%. MEDICATIONS:     Current Outpatient Prescriptions   Medication Sig Dispense Refill    latanoprost (XALATAN) 0.005 % ophthalmic solution Administer 1 Drop to both eyes nightly.  OTHER Apply  to affected area as needed. Indications: aspercream      rosuvastatin (CRESTOR) 20 mg tablet Take 1 Tab by mouth nightly. (Patient taking differently: Take 20 mg by mouth nightly. Indications: 0.5 tablet) 30 Tab 12    carvedilol (COREG) 3.125 mg tablet Take 1 Tab by mouth two (2) times daily (with meals). 60 Tab 12    cyclobenzaprine (FLEXERIL) 10 mg tablet Take  by mouth as needed.  naproxen sodium (ALEVE) 220 mg cap Take  by mouth as needed.  multivitamins-minerals-lutein (CENTRUM SILVER) tab tablet Take 1 Tab by mouth daily.  loperamide (IMODIUM) 1 mg/5 mL solution Take  by mouth as needed for Diarrhea.       Cholecalciferol, Vitamin D3, (VITAMIN D3) 1,000 unit cap Take  by mouth daily.  NIFEdipine ER (ADALAT CC) 90 mg ER tablet Take 90 mg by mouth daily.  fexofenadine (ALLEGRA) 180 mg tablet Take  by mouth daily as needed.  triamterene-hydrochlorothiazide (DYAZIDE) 37.5-25 mg per capsule Take  by mouth daily. Indications: 0.5 tablet      aspirin 81 mg chewable tablet Take 1 Tab by mouth daily. 30 Tab 11    alendronate (FOSAMAX) 70 mg tablet Take  by mouth every Sunday. Allergies   Allergen Reactions    Animal Dander Other (comments)     Cats. Stuffy nose    Darvon [Propoxyphene] Nausea Only    Hay Fever And Allergy Relief Cough and Sneezing    Mold Other (comments)     Stuff nose             SOCIAL HISTORY:     Social History   Substance Use Topics    Smoking status: Never Smoker    Smokeless tobacco: Never Used    Alcohol use Yes      Comment: 1 glass of wine daily            FAMILY HISTORY:     Family History   Problem Relation Age of Onset    Stroke Mother     COPD Father              REVIEW OF SYSTEMS:        Review of Systems:    Constitutional: Negative for fever, chills    HEENT: Negative for vision changes.    Respiratory: Negative for cough    Cardiovascular: Negative for orthopnea, syncope, and PND.    Gastrointestinal: Negative for abdominal pain or melena    Genitourinary: Negative for dysuria    Musculoskeletal: Negative for myalgias.    Skin: Negative for rash    Heme: No problems bleeding.    Neurological: Negative for speech change and focal weakness.    occ diarrhea   + night sweats                       PHYSICAL EXAM:       Physical Exam:  Visit Vitals    /72 (BP 1 Location: Right arm)    Pulse 81    Wt 136 lb (61.7 kg)    SpO2 97%    BMI 30.49 kg/m2          Patient appears generally well, mood and affect are appropriate and pleasant. HEENT: Hearing intact, non-icteric, normocephalic, atraumatic. Neck Exam: Supple, No JVD or carotid bruits. Lung Exam: Clear to auscultation, even breath sounds.    Cardiac Exam: Regular rate and rhythm with no murmur  Abdomen: Soft, non-tender, normal bowel sounds. No bruits or masses. Extremities: Moves all ext well. No lower extremity edema. Vascular: 2+ dorsalis pedis pulses bilaterally. Psych: Appropriate affect  Neuro - Grossly intact              LABS / OTHER STUDIES:        Labs 6/29/15 - chol 121, LDL 58, , HDL 43, LDL-P 1045, ApoB 54, CRP 4.6, proBNP 48, Insulin 22, glc 107, A1c 6.5, Cr 0.8, K 4.2, LFT's OK, ASA works 460  Labs 12/16/15 - chol 122, LDL 57, HDL 50, TG 85, Apo B 51, LDL-P 794, CRP 3.5, proBNP 44, A1c 6.1, CMP OK  Labs 7/27/16 - CMP OK (glc 143), TSH 2.1, A1c 6.5, CBC OK, chol 155, HDL 47, LDL 79,   Labs 1/31/17 - CBC OK, TSH 2.2,                 Lab Results   Component Value Date/Time      Sodium 142 03/06/2017 09:06 AM      Potassium 3.8 03/06/2017 09:06 AM      Chloride 99 03/06/2017 09:06 AM      CO2 26 03/06/2017 09:06 AM      Anion gap 12 12/15/2015 09:20 AM      Glucose 130 03/06/2017 09:06 AM      BUN 18 03/06/2017 09:06 AM      Creatinine 0.75 03/06/2017 09:06 AM      BUN/Creatinine ratio 24 03/06/2017 09:06 AM      GFR est AA 90 03/06/2017 09:06 AM      GFR est non-AA 78 03/06/2017 09:06 AM      Calcium 9.7 03/06/2017 09:06 AM      Bilirubin, total 0.7 03/06/2017 09:06 AM      AST (SGOT) 15 03/06/2017 09:06 AM      Alk.  phosphatase 68 03/06/2017 09:06 AM      Protein, total 7.3 03/06/2017 09:06 AM      Albumin 4.7 03/06/2017 09:06 AM      A-G Ratio 1.8 03/06/2017 09:06 AM      ALT (SGPT) 20 03/06/2017 09:06 AM       Component  Latest Ref Rng & Units 3/6/2017 12/16/2016      9:06 AM 8:26 AM   LDL-P  <1000 nmol/L 1130 (H) 1216 (H)   LDL-C  0 - 99 mg/dL 80 83   HDL-C  >39 mg/dL 60 49   Triglycerides  0 - 149 mg/dL 139 123   Cholesterol, total  100 - 199 mg/dL 168 157   HDL-P (Total)  >=30.5 umol/L 39.0 35.4   Small LDL-P  <=527 nmol/L 603 (H) 837 (H)   LDL size  >20.5 nm 20.5 20.0   LP-IR SCORE  <=45 70 (H) 74 (H)           Component Latest Ref Rng & Units 4/19/2017 4/19/2017 4/19/2017       8:25 AM  8:25 AM  8:25 AM   LDL-P      <1000 nmol/L     664   LDL-C      0 - 99 mg/dL     38   HDL-C      >39 mg/dL     49   Triglycerides      0 - 149 mg/dL     102   Cholesterol, total      100 - 199 mg/dL     107   HDL-P (Total)      >=30.5 umol/L     36.4   Small LDL-P      <=527 nmol/L     452   LDL size      >20.5 nm     20.1   LP-IR SCORE      <=45     64 (H)   Hemoglobin A1c, (calculated)      4.8 - 5.6 % 6.9 (H)       Estimated average glucose      mg/dL 151       TSH      0.450 - 4.500 uIU/mL   2.180               CARDIAC DIAGNOSTICS:       Cardiac Evaluation Includes:  EKG 4/16/14 - NSR, normal    EKG 6/29/15 - NSR, normal   EKG 12/21/16 - NSR, normal   EKG 11/27/17 - NSR, normal           Carotid Dopplers 2/1/17 - mild plaque - 0-49% stenosis bilat   Exercise Cardiolite 4/4/17 - walked 3:16 (4.6 METS). No CP or ischemic EKG changes. Normal MPI. LVEF 69%  Echo 4/4/17 - mild LVH. LVEF 55-60%              ASSESSMENT AND PLAN:        Assessment and Plan:    1) CAD    - CT Heart Scan 4/17/14 shows CAC Score 534, (80th%)     - Exercise Cardiolite 4/4/17 - walked 3:16 (4.6 METS). No CP or ischemic EKG changes. Normal MPI. LVEF 69%  - Ms. Lori Lyons has atypical chest pain. - For prevention of CV events, continue ASA 81 and statin . - work on a healthy diet and exercise        2) SHEREE on CPAP      3) Dyslipidemia -    - prior LDL-P and ApoB were OK on atorvastatin 20 mg. Was switched due pravastatin due to \"liver issues\". Switched to crestor for higher potency.    - Recent LFT's and lipids OK  - Will get labs from PCP to review       4) HTN  - she feels tired so stopped atenolol, but this did not help any.   - BP looks good on current regimen which she is tolerating (due to increased Cr, she is on half a dose of Dyazide)      5) Diabetes  - On 4/17 - A1c 6.9 - management per PCP       6) Fatigue  - I don't see a cardiac explanation for symptoms  - Despite making medicine changes (stopping atenolol and switching statin) her symptoms persist  - I asked her to continue to exercise and eat healthy   - Encouraged her to keep exercising   - If symptoms worsen, will reassess from cardiac standpoint      7) RTC in 1 year. Patient expressed understanding of the plan - questions were answered. She exercises at [facility] 3 days a week.        Swapnil Stone MD, [facility]  [address] [place] [address], [address]  [address] [place] [address] Drive.  [address] [facility], [address] [place] [address]  Ph: [phone]   Ph [phone]      ADDENDUM   5/18/2018  Labs 5/7/18 - CMP OK (glc 148), chol 116, , HDL 48, LDL 44, A1c 6.9

## 2017-11-27 NOTE — PROGRESS NOTES
Visit Vitals    /72 (BP 1 Location: Right arm)    Pulse 81    Wt 136 lb (61.7 kg)    SpO2 97%    BMI 30.49 kg/m2     Pt complains of fatigue

## 2018-04-02 DIAGNOSIS — E11.9 CONTROLLED TYPE 2 DIABETES MELLITUS WITHOUT COMPLICATION, WITHOUT LONG-TERM CURRENT USE OF INSULIN (HCC): ICD-10-CM

## 2018-04-02 DIAGNOSIS — I25.10 CORONARY ARTERY DISEASE INVOLVING NATIVE CORONARY ARTERY OF NATIVE HEART WITHOUT ANGINA PECTORIS: ICD-10-CM

## 2018-04-02 DIAGNOSIS — E78.5 HYPERLIPIDEMIA, UNSPECIFIED HYPERLIPIDEMIA TYPE: ICD-10-CM

## 2018-04-02 DIAGNOSIS — R53.82 CHRONIC FATIGUE: ICD-10-CM

## 2018-04-02 DIAGNOSIS — I10 ESSENTIAL HYPERTENSION: ICD-10-CM

## 2018-04-02 DIAGNOSIS — R73.03 BORDERLINE DIABETES MELLITUS: ICD-10-CM

## 2018-04-02 RX ORDER — ROSUVASTATIN CALCIUM 20 MG/1
20 TABLET, COATED ORAL
Qty: 45 TAB | Refills: 2 | Status: SHIPPED | OUTPATIENT
Start: 2018-04-02 | End: 2018-06-27 | Stop reason: SDUPTHER

## 2018-06-27 DIAGNOSIS — E11.9 CONTROLLED TYPE 2 DIABETES MELLITUS WITHOUT COMPLICATION, WITHOUT LONG-TERM CURRENT USE OF INSULIN (HCC): ICD-10-CM

## 2018-06-27 DIAGNOSIS — E78.5 HYPERLIPIDEMIA, UNSPECIFIED HYPERLIPIDEMIA TYPE: ICD-10-CM

## 2018-06-27 DIAGNOSIS — I10 ESSENTIAL HYPERTENSION: ICD-10-CM

## 2018-06-27 DIAGNOSIS — R53.82 CHRONIC FATIGUE: ICD-10-CM

## 2018-06-27 DIAGNOSIS — R73.03 BORDERLINE DIABETES MELLITUS: ICD-10-CM

## 2018-06-27 DIAGNOSIS — I25.10 CORONARY ARTERY DISEASE INVOLVING NATIVE CORONARY ARTERY OF NATIVE HEART WITHOUT ANGINA PECTORIS: ICD-10-CM

## 2018-06-27 RX ORDER — ROSUVASTATIN CALCIUM 20 MG/1
20 TABLET, COATED ORAL
Qty: 90 TAB | Refills: 0 | Status: SHIPPED | OUTPATIENT
Start: 2018-06-27 | End: 2018-11-16 | Stop reason: SDUPTHER

## 2018-10-04 ENCOUNTER — HOSPITAL ENCOUNTER (OUTPATIENT)
Dept: ULTRASOUND IMAGING | Age: 78
Discharge: HOME | End: 2018-10-04
Attending: INTERNAL MEDICINE
Payer: MEDICARE

## 2018-10-04 DIAGNOSIS — R80.9 PROTEINURIA: ICD-10-CM

## 2018-10-04 PROCEDURE — 76770 US EXAM ABDO BACK WALL COMP: CPT

## 2018-11-07 ENCOUNTER — TELEPHONE (OUTPATIENT)
Dept: CARDIOLOGY CLINIC | Age: 78
End: 2018-11-07

## 2018-11-07 NOTE — TELEPHONE ENCOUNTER
Pt is calling in regards to wanting to know if she would have labs that need to be done before her appointment with Dr. Manjinder Palacios.    Phone# [phone]

## 2018-11-07 NOTE — TELEPHONE ENCOUNTER
Will forward to Dr Beny Beckett. She had lipids done in May.  In [facility]

## 2018-11-16 DIAGNOSIS — I25.10 CORONARY ARTERY DISEASE INVOLVING NATIVE CORONARY ARTERY OF NATIVE HEART WITHOUT ANGINA PECTORIS: ICD-10-CM

## 2018-11-16 DIAGNOSIS — E78.5 HYPERLIPIDEMIA, UNSPECIFIED HYPERLIPIDEMIA TYPE: ICD-10-CM

## 2018-11-16 DIAGNOSIS — E11.9 CONTROLLED TYPE 2 DIABETES MELLITUS WITHOUT COMPLICATION, WITHOUT LONG-TERM CURRENT USE OF INSULIN (HCC): ICD-10-CM

## 2018-11-16 DIAGNOSIS — I10 ESSENTIAL HYPERTENSION: ICD-10-CM

## 2018-11-16 DIAGNOSIS — R73.03 BORDERLINE DIABETES MELLITUS: ICD-10-CM

## 2018-11-16 DIAGNOSIS — R53.82 CHRONIC FATIGUE: ICD-10-CM

## 2018-11-18 RX ORDER — ROSUVASTATIN CALCIUM 20 MG/1
20 TABLET, COATED ORAL
Qty: 30 TAB | Refills: 0 | Status: SHIPPED | OUTPATIENT
Start: 2018-11-18 | End: 2019-04-18 | Stop reason: SDUPTHER

## 2018-11-26 ENCOUNTER — OFFICE VISIT (OUTPATIENT)
Dept: CARDIOLOGY CLINIC | Age: 78
End: 2018-11-26

## 2018-11-26 VITALS
HEIGHT: 56 IN | DIASTOLIC BLOOD PRESSURE: 64 MMHG | RESPIRATION RATE: 16 BRPM | HEART RATE: 76 BPM | BODY MASS INDEX: 31.05 KG/M2 | SYSTOLIC BLOOD PRESSURE: 112 MMHG | WEIGHT: 138 LBS

## 2018-11-26 DIAGNOSIS — I25.10 CORONARY ARTERY DISEASE INVOLVING NATIVE CORONARY ARTERY OF NATIVE HEART WITHOUT ANGINA PECTORIS: Primary | ICD-10-CM

## 2018-11-26 DIAGNOSIS — I10 ESSENTIAL HYPERTENSION: ICD-10-CM

## 2018-11-26 DIAGNOSIS — E78.5 HYPERLIPIDEMIA, UNSPECIFIED HYPERLIPIDEMIA TYPE: ICD-10-CM

## 2018-11-26 RX ORDER — TRIAMTERENE/HYDROCHLOROTHIAZID 37.5-25 MG
0.5 TABLET ORAL DAILY
COMMUNITY
End: 2019-08-12 | Stop reason: SDUPTHER

## 2018-11-26 RX ORDER — METFORMIN HYDROCHLORIDE 500 MG/1
TABLET ORAL
COMMUNITY

## 2018-11-26 NOTE — PROGRESS NOTES
Shermon Dakin, MD. [facility] [place]              Patient: Abram Leon  : 1940      Today's Date: 2018            HISTORY OF PRESENT ILLNESS:     History of Present Illness:  Ms. Jey Paulino is here for follow-up. Has Right TKR in .  No cardiac complaints. No CP. Has chronic HIGHTOWER climbing steps. Otherwise OK. No dizziness. PAST MEDICAL HISTORY:     Past Medical History:   Diagnosis Date    Arthritis     CAD (coronary artery disease)     CT Heart Scan 14 - CAC Score 534, (80th%)    Diabetes mellitus ([address]     Diverticulosis     Dyslipidemia     GERD (gastroesophageal reflux disease)     HTN (hypertension)     Obesity     S/P hip replacement     S/P knee replacement     Sleep apnea     on CPAP         Past Surgical History:   Procedure Laterality Date    HX KNEE REPLACEMENT      left,     HX OTHER SURGICAL      CT Heart Scan 14 - CAC Score 534, (80th%). Prominent lymph nodes.  HX OTHER SURGICAL      Exercise cardiolite (14) - walked 3:15 (4.6 METS) - no ischemic EKG changes. Normal MPI (+ breast attenuation). LVEF 70%. MEDICATIONS:     Current Outpatient Medications   Medication Sig Dispense Refill    triamterene-hydroCHLOROthiazide (MAXZIDE) 37.5-25 mg per tablet Take 0.5 Tabs by mouth daily.  metFORMIN (GLUCOPHAGE) 500 mg tablet Take  by mouth two (2) times daily (with meals).  rosuvastatin (CRESTOR) 20 mg tablet Take 1 Tab by mouth nightly. 30 Tab 0    carvedilol (COREG) 3.125 mg tablet TAKE ONE TABLET BY MOUTH TWICE DAILY WITH MEALS 180 Tab 2    latanoprost (XALATAN) 0.005 % ophthalmic solution Administer 1 Drop to both eyes nightly.  cyclobenzaprine (FLEXERIL) 10 mg tablet Take  by mouth as needed.  naproxen sodium (ALEVE) 220 mg cap Take  by mouth as needed.  multivitamins-minerals-lutein (CENTRUM SILVER) tab tablet Take 1 Tab by mouth daily.       loperamide (IMODIUM) 1 mg/5 mL solution Take  by mouth as needed for Diarrhea.  Cholecalciferol, Vitamin D3, (VITAMIN D3) 1,000 unit cap Take  by mouth daily.  NIFEdipine ER (ADALAT CC) 90 mg ER tablet Take 90 mg by mouth daily.  fexofenadine (ALLEGRA) 180 mg tablet Take  by mouth daily as needed.  aspirin 81 mg chewable tablet Take 1 Tab by mouth daily. 30 Tab 11       Allergies   Allergen Reactions    Animal Dander Other (comments)     Cats. Stuffy nose    Darvon [Propoxyphene] Nausea Only    Hay Fever And Allergy Relief Cough and Sneezing    Mold Other (comments)     Stuff nose             SOCIAL HISTORY:     Social History     Tobacco Use    Smoking status: Never Smoker    Smokeless tobacco: Never Used   Substance Use Topics    Alcohol use: Yes     Comment: 1 glass of wine daily     Drug use: No               REVIEW OF SYSTEMS:        Review of Systems:    Constitutional: Negative for fever, chills    HEENT: Negative for vision changes.    Respiratory: Negative for cough    Cardiovascular: Negative for orthopnea, syncope, and PND.    Gastrointestinal: Negative for abdominal pain or melena    Genitourinary: Negative for dysuria    Musculoskeletal: Negative for myalgias.  + joint pain   Skin: Negative for rash    Heme: No problems bleeding.    Neurological: Negative for speech change and focal weakness.                        PHYSICAL EXAM:       Physical Exam:  Visit Vitals  /64 (BP 1 Location: Left arm)   Pulse 76   Resp 16   Ht 4' 8\" (1.422 m)   Wt 138 lb (62.6 kg)   BMI 30.94 kg/m²       Patient appears generally well, mood and affect are appropriate and pleasant. HEENT: Hearing intact, non-icteric, normocephalic, atraumatic. Neck Exam: Supple, No JVD or carotid bruits. Lung Exam: Clear to auscultation, even breath sounds. Cardiac Exam: Regular rate and rhythm with no murmur  Abdomen: Soft, non-tender, normal bowel sounds. No bruits or masses. Extremities: Moves all ext well. Trace ankle edema.   Vascular: 2+ dorsalis pedis pulses bilaterally. Psych: Appropriate affect  Neuro - Grossly intact              LABS / OTHER STUDIES:        Labs 6/29/15 - chol 121, LDL 58, , HDL 43, LDL-P 1045, ApoB 54, CRP 4.6, proBNP 48, Insulin 22, glc 107, A1c 6.5, Cr 0.8, K 4.2, LFT's OK, ASA works 460  Labs 12/16/15 - chol 122, LDL 57, HDL 50, TG 85, Apo B 51, LDL-P 794, CRP 3.5, proBNP 44, A1c 6.1, CMP OK  Labs 7/27/16 - CMP OK (glc 143), TSH 2.1, A1c 6.5, CBC OK, chol 155, HDL 47, LDL 79,   Labs 1/31/17 - CBC OK, TSH 2.2,       Labs 5/7/18 - CMP OK (glc 148), chol 116, , HDL 48, LDL 44, A1c 6.9   Labs 8/18 - CMP OK, chol 116, HDL 48, , LDL 44         CARDIAC DIAGNOSTICS:       Cardiac Evaluation Includes:  EKG 4/16/14 - NSR, normal    EKG 6/29/15 - NSR, normal   EKG 12/21/16 - NSR, normal   EKG 11/27/17 - NSR, normal   EKG 11/26/18 - NSR, PRWP          Carotid Dopplers 2/1/17 - mild plaque - 0-49% stenosis bilat   Exercise Cardiolite 4/4/17 - walked 3:16 (4.6 METS). No CP or ischemic EKG changes. Normal MPI. LVEF 69%  Echo 4/4/17 - mild LVH. LVEF 55-60%                  ASSESSMENT AND PLAN:        Assessment and Plan:    1) Preop Evaluation   - TKR planned with Mellissa Che  - She can proceed with surgery and should have intermediate CV risk  - She can hold ASA a week prior to surgery and resume when safe     2) CAD    - CT Heart Scan 4/17/14 shows CAC Score 534, (80th%)     - Exercise Cardiolite 4/4/17 - walked 3:16 (4.6 METS). No CP or ischemic EKG changes. Normal MPI. LVEF 69%  - For prevention of CV events, continue ASA 81 and statin .    - work on a healthy diet and exercise    - She denies chest pain       3) SHEREE on CPAP      4) Dyslipidemia -    - cont statin  - Recent LFT's and lipids OK      5) HTN  - she feels tired so stopped atenolol, but this did not help any.   - BP looks good on current regimen which she is tolerating (due to increased Cr, she is on half a dose of Dyazide)      6) Diabetes  - management per PCP     7) Fatigue  - I don't see a cardiac explanation for symptoms  - Despite making medicine changes (stopping atenolol and switching statin) her symptoms persist  - I asked her to continue to exercise and eat healthy   - If symptoms worsen, will reassess from cardiac standpoint      8) RTC in 1 year. Patient expressed understanding of the plan - questions were answered. She exercises at [facility] 3 days a week.          Kimberlee Chen MD, [address], [address]  [address], [address] [place], [address] [place] [address]  Ph: [phone]                               Ph [phone]          ADDENDUM   12/9/2018  Labs 11/29/18 - CMP OK, chol 117, TG 64, LDL 52, HDL 52, CBC OK, A1c 6.3

## 2018-12-30 DIAGNOSIS — I10 ESSENTIAL HYPERTENSION: ICD-10-CM

## 2018-12-30 DIAGNOSIS — R73.03 BORDERLINE DIABETES MELLITUS: ICD-10-CM

## 2018-12-30 DIAGNOSIS — E11.9 CONTROLLED TYPE 2 DIABETES MELLITUS WITHOUT COMPLICATION, WITHOUT LONG-TERM CURRENT USE OF INSULIN (HCC): ICD-10-CM

## 2018-12-30 DIAGNOSIS — I25.10 CORONARY ARTERY DISEASE INVOLVING NATIVE CORONARY ARTERY OF NATIVE HEART WITHOUT ANGINA PECTORIS: ICD-10-CM

## 2018-12-30 DIAGNOSIS — E78.5 HYPERLIPIDEMIA, UNSPECIFIED HYPERLIPIDEMIA TYPE: ICD-10-CM

## 2018-12-30 DIAGNOSIS — R53.82 CHRONIC FATIGUE: ICD-10-CM

## 2019-01-03 RX ORDER — CARVEDILOL 3.12 MG/1
TABLET ORAL
Qty: 180 TAB | Refills: 2 | Status: SHIPPED | OUTPATIENT
Start: 2019-01-03 | End: 2019-10-17 | Stop reason: SDUPTHER

## 2019-01-04 DIAGNOSIS — E11.9 CONTROLLED TYPE 2 DIABETES MELLITUS WITHOUT COMPLICATION, WITHOUT LONG-TERM CURRENT USE OF INSULIN (HCC): ICD-10-CM

## 2019-01-04 DIAGNOSIS — I10 ESSENTIAL HYPERTENSION: ICD-10-CM

## 2019-01-04 DIAGNOSIS — I25.10 CORONARY ARTERY DISEASE INVOLVING NATIVE CORONARY ARTERY OF NATIVE HEART WITHOUT ANGINA PECTORIS: ICD-10-CM

## 2019-01-04 DIAGNOSIS — R73.03 BORDERLINE DIABETES MELLITUS: ICD-10-CM

## 2019-01-04 DIAGNOSIS — E78.5 HYPERLIPIDEMIA, UNSPECIFIED HYPERLIPIDEMIA TYPE: ICD-10-CM

## 2019-01-04 DIAGNOSIS — R53.82 CHRONIC FATIGUE: ICD-10-CM

## 2019-01-04 RX ORDER — CARVEDILOL 3.12 MG/1
TABLET ORAL
Qty: 180 TAB | Refills: 2 | Status: CANCELLED | OUTPATIENT
Start: 2019-01-04

## 2019-08-12 ENCOUNTER — OFFICE VISIT (OUTPATIENT)
Dept: CARDIOLOGY CLINIC | Age: 79
End: 2019-08-12

## 2019-08-12 VITALS
HEART RATE: 68 BPM | DIASTOLIC BLOOD PRESSURE: 62 MMHG | WEIGHT: 134 LBS | RESPIRATION RATE: 16 BRPM | HEIGHT: 56 IN | BODY MASS INDEX: 30.14 KG/M2 | SYSTOLIC BLOOD PRESSURE: 122 MMHG

## 2019-08-12 DIAGNOSIS — R06.02 SOB (SHORTNESS OF BREATH): Primary | ICD-10-CM

## 2019-08-12 DIAGNOSIS — I25.10 CORONARY ARTERY DISEASE INVOLVING NATIVE CORONARY ARTERY OF NATIVE HEART WITHOUT ANGINA PECTORIS: ICD-10-CM

## 2019-08-12 DIAGNOSIS — I10 ESSENTIAL HYPERTENSION: ICD-10-CM

## 2019-08-12 DIAGNOSIS — E78.5 HYPERLIPIDEMIA: ICD-10-CM

## 2019-08-12 RX ORDER — TRIAMTERENE/HYDROCHLOROTHIAZID 37.5-25 MG
1 TABLET ORAL DAILY
Qty: 30 TAB | Refills: 12 | Status: SHIPPED | OUTPATIENT
Start: 2019-08-12 | End: 2020-09-08

## 2019-08-12 RX ORDER — MELATONIN 5 MG
5 CAPSULE ORAL
COMMUNITY
End: 2022-09-30

## 2019-08-12 RX ORDER — ROSUVASTATIN CALCIUM 20 MG/1
10 TABLET, COATED ORAL
COMMUNITY
End: 2020-08-26 | Stop reason: SDUPTHER

## 2019-08-12 NOTE — PROGRESS NOTES
Kadeem Andres MD. [facility] [place]              Patient: Carlos Ryan  : 1940      Today's Date: 2019            HISTORY OF PRESENT ILLNESS:     History of Present Illness:  Here for follow-up. Dr. Jamie Wolf referred her back after recent heart scan and carotid dopplers. CAC score about 1200. Denies CP. Since May she noticed more fatigue and more HIGHTOWER. She remains active exercising. PAST MEDICAL HISTORY:     Past Medical History:   Diagnosis Date    Arthritis     CAD (coronary artery disease)     CT Heart Scan 14 - CAC Score 534, (80th%)    Diabetes mellitus ([address]     Diverticulosis     Dyslipidemia     GERD (gastroesophageal reflux disease)     HTN (hypertension)     Obesity     S/P hip replacement     S/P knee replacement     Sleep apnea     on CPAP       Past Surgical History:   Procedure Laterality Date    HX KNEE REPLACEMENT      left, 2013    HX OTHER SURGICAL      CT Heart Scan 14 - CAC Score 534, (80th%). Prominent lymph nodes.  HX OTHER SURGICAL      Exercise cardiolite (14) - walked 3:15 (4.6 METS) - no ischemic EKG changes. Normal MPI (+ breast attenuation). LVEF 70%. MEDICATIONS:     Current Outpatient Medications   Medication Sig Dispense Refill    rosuvastatin (CRESTOR) 20 mg tablet Take 10 mg by mouth nightly.  melatonin 5 mg cap capsule Take 5 mg by mouth nightly.  carvedilol (COREG) 3.125 mg tablet TAKE 1 TABLET BY MOUTH TWICE DAILY WITH MEALS 180 Tab 2    triamterene-hydroCHLOROthiazide (MAXZIDE) 37.5-25 mg per tablet Take 0.5 Tabs by mouth daily.  metFORMIN (GLUCOPHAGE) 500 mg tablet Take  by mouth two (2) times daily (with meals).  latanoprost (XALATAN) 0.005 % ophthalmic solution Administer 1 Drop to both eyes nightly.  cyclobenzaprine (FLEXERIL) 10 mg tablet Take  by mouth as needed.  multivitamins-minerals-lutein (CENTRUM SILVER) tab tablet Take 1 Tab by mouth daily.       loperamide (IMODIUM) 1 mg/5 mL solution Take  by mouth as needed for Diarrhea.  Cholecalciferol, Vitamin D3, (VITAMIN D3) 1,000 unit cap Take  by mouth daily.  NIFEdipine ER (ADALAT CC) 90 mg ER tablet Take 90 mg by mouth daily.  fexofenadine (ALLEGRA) 180 mg tablet Take  by mouth daily as needed.  aspirin 81 mg chewable tablet Take 1 Tab by mouth daily. 30 Tab 11       Allergies   Allergen Reactions    Animal Dander Other (comments)     Cats. Stuffy nose    Darvon [Propoxyphene] Nausea Only    Hay Fever And Allergy Relief Cough and Sneezing    Mold Other (comments)     Stuff nose           SOCIAL HISTORY:     Social History     Tobacco Use    Smoking status: Never Smoker    Smokeless tobacco: Never Used   Substance Use Topics    Alcohol use: Yes     Comment: 1 glass of wine daily     Drug use: No         FAMILY HISTORY:     Family History   Problem Relation Age of Onset    Stroke Mother     COPD Father            REVIEW OF SYSTEMS:        Review of Systems:    Constitutional: Negative for fever, chills    HEENT: Negative for vision changes.    Respiratory: Negative for cough    Cardiovascular: Negative for orthopnea, syncope, and PND.    Gastrointestinal: Negative for abdominal pain or melena    Genitourinary: Negative for dysuria    Musculoskeletal: Negative for myalgias.  + joint pain   Skin: Negative for rash    Heme: No problems bleeding.    Neurological: Negative for speech change and focal weakness.    + diarrhea                   PHYSICAL EXAM:       Physical Exam:  Visit Vitals  /62 (BP 1 Location: Left arm)   Pulse 68   Resp 16   Ht 4' 8\" (1.422 m)   Wt 134 lb (60.8 kg)   BMI 30.04 kg/m²          Patient appears generally well, mood and affect are appropriate and pleasant. HEENT: Hearing intact, non-icteric, normocephalic, atraumatic. Neck Exam: Supple, No JVD  Lung Exam: Clear to auscultation, even breath sounds.    Cardiac Exam: Regular rate and rhythm with no murmur  Abdomen: Soft, non-tender, normal bowel sounds. No bruits or masses. Extremities: Moves all ext well. Mild bilat LE edema. Psych: Appropriate affect  Neuro - Grossly intact              LABS / OTHER STUDIES:        Labs 6/29/15 - chol 121, LDL 58, , HDL 43, LDL-P 1045, ApoB 54, CRP 4.6, proBNP 48, Insulin 22, glc 107, A1c 6.5, Cr 0.8, K 4.2, LFT's OK, ASA works 460  Labs 12/16/15 - chol 122, LDL 57, HDL 50, TG 85, Apo B 51, LDL-P 794, CRP 3.5, proBNP 44, A1c 6.1, CMP OK  Labs 7/27/16 - CMP OK (glc 143), TSH 2.1, A1c 6.5, CBC OK, chol 155, HDL 47, LDL 79,   Labs 1/31/17 - CBC OK, TSH 2.2,       Labs 5/7/18 - CMP OK (glc 148), chol 116, , HDL 48, LDL 44, A1c 6.9   Labs 8/18 - CMP OK, chol 116, HDL 48, , LDL 44    Labs 11/29/18 - CMP OK, chol 117, TG 64, LDL 52, HDL 52, CBC OK, A1c 6.3         CARDIAC DIAGNOSTICS:       Cardiac Evaluation Includes:  EKG 4/16/14 - NSR, normal    EKG 6/29/15 - NSR, normal   EKG 12/21/16 - NSR, normal   EKG 11/27/17 - NSR, normal   EKG 11/26/18 - NSR, PRWP          Carotid Dopplers 2/1/17 - mild plaque - 0-49% stenosis bilat   Exercise Cardiolite 4/4/17 - walked 3:16 (4.6 METS). No CP or ischemic EKG changes. Normal MPI. LVEF 69%  Echo 4/4/17 - mild LVH. LVEF 55-60%                    ASSESSMENT AND PLAN:        Assessment and Plan:    1) CAD    - CT Heart Scan 4/17/14 shows CAC Score 534, (80th%)     - CAC score recently ~ 1200   - Given more fatigue and HIGHTOWER past few months, will recheck an exercise cardiolite to reassess.   - For prevention of CV events, continue ASA 81 and statin .    - work on a healthy diet and exercise    - She denies chest pain     2) Fatigue  - I don't think there is a cardiac explanation for symptoms   - Despite making medicine changes (stopping atenolol and switching statin) her symptoms persist  - I asked her to continue to exercise and eat healthy   - she did stop exercising after knee surgery which could have made things worse   - On 8/19 - Rechecking a stress cardiolite as above -- she prefers this approach rather than proceeding with a cardiac cath. Consider a cath if problems persist unexplained. - due to more HIGHTOWER, will try and increase diuretics again and see ow she feels. Also hold nifeidpine for edema (and allow us to raise diuretic)       2) SHEREE on CPAP      3) Dyslipidemia -    - cont statin - prior labs OK   - repeat labs with PCP       4) HTN  - she feels tired so stopped atenolol, but this did not help any.   - see above      5) Diabetes  - management per PCP       7) RTC in 1 month.   Patient expressed understanding of the plan - questions were answered. She exercises at [facility] 3 days a week.           Danette Hunt MD, [facility] [address] Walterine Goldberg, [address]      [id number] [place] [address]. [address] [place] [address]  Ph: [phone]                                [phone]        ADDENDUM   9/17/2019  Exercise Cardiolite 9/16/19 - walked 4:32 (5.5 METS), No CP and normal stress EKG. Normal MPI. LVEF 72%. /88 at baseline. Will have nurse call with normal stress test results.

## 2019-08-12 NOTE — PROGRESS NOTES
Visit Vitals  /62 (BP 1 Location: Left arm)   Pulse 68   Resp 16   Ht 4' 8\" (1.422 m)   Wt 134 lb (60.8 kg)   BMI 30.04 kg/m²     Patient is here per PCP for abnormal calcium score which was ordered by him.

## 2019-08-28 ENCOUNTER — HOSPITAL ENCOUNTER (OUTPATIENT)
Dept: LAB | Age: 79
Discharge: HOME | End: 2019-08-28
Payer: MEDICARE

## 2019-08-28 PROCEDURE — 83880 ASSAY OF NATRIURETIC PEPTIDE: CPT

## 2019-08-28 PROCEDURE — 36415 COLL VENOUS BLD VENIPUNCTURE: CPT

## 2019-08-28 PROCEDURE — 80048 BASIC METABOLIC PNL TOTAL CA: CPT

## 2019-08-29 LAB
BUN SERPL-MCNC: 10 MG/DL (ref 8–27)
BUN/CREAT SERPL: 16 (ref 12–28)
CALCIUM SERPL-MCNC: 9.5 MG/DL (ref 8.7–10.3)
CHLORIDE SERPL-SCNC: 94 MMOL/L (ref 96–106)
CO2 SERPL-SCNC: 26 MMOL/L (ref 20–29)
CREAT SERPL-MCNC: 0.64 MG/DL (ref 0.57–1)
GLUCOSE SERPL-MCNC: 99 MG/DL (ref 65–99)
NT-PROBNP SERPL-MCNC: 88 PG/ML (ref 0–738)
POTASSIUM SERPL-SCNC: 3.7 MMOL/L (ref 3.5–5.2)
SODIUM SERPL-SCNC: 137 MMOL/L (ref 134–144)

## 2019-09-19 ENCOUNTER — TELEPHONE (OUTPATIENT)
Dept: CARDIOLOGY CLINIC | Age: 79
End: 2019-09-19

## 2019-09-19 NOTE — TELEPHONE ENCOUNTER
LVM  Per Dr. Greta Iyer: \"Exercise Cardiolite 9/16/19 - walked 4:32 (5.5 METS), No CP and normal stress EKG.  Normal MPI.  LVEF 72%.   /88 at baseline. Will have nurse call with normal stress test results. \"    Cathlean Norma 9/23/19

## 2019-09-20 ENCOUNTER — TELEPHONE (OUTPATIENT)
Dept: CARDIOLOGY CLINIC | Age: 79
End: 2019-09-20

## 2019-09-20 NOTE — TELEPHONE ENCOUNTER
Spoke to pt,  Per Dr. Glendy Joyce: \"Exercise Cardiolite 9/16/19 - walked 4:32 (5.5 METS), No CP and normal stress EKG.  Normal MPI.  LVEF 72%.   /88 at baseline. Will have nurse call with normal stress test results. \"     NOV 9/23/19

## 2019-09-23 ENCOUNTER — OFFICE VISIT (OUTPATIENT)
Dept: CARDIOLOGY CLINIC | Age: 79
End: 2019-09-23

## 2019-09-23 VITALS
DIASTOLIC BLOOD PRESSURE: 82 MMHG | OXYGEN SATURATION: 98 % | SYSTOLIC BLOOD PRESSURE: 138 MMHG | BODY MASS INDEX: 30.14 KG/M2 | RESPIRATION RATE: 16 BRPM | HEIGHT: 56 IN | HEART RATE: 72 BPM | WEIGHT: 134 LBS

## 2019-09-23 DIAGNOSIS — I10 ESSENTIAL HYPERTENSION: Primary | ICD-10-CM

## 2019-09-23 DIAGNOSIS — I25.10 CORONARY ARTERY DISEASE INVOLVING NATIVE CORONARY ARTERY OF NATIVE HEART WITHOUT ANGINA PECTORIS: ICD-10-CM

## 2019-09-23 RX ORDER — NIFEDIPINE 90 MG/1
TABLET, FILM COATED, EXTENDED RELEASE ORAL
Refills: 0 | COMMUNITY
Start: 2019-07-12 | End: 2019-09-23

## 2019-09-23 RX ORDER — NIFEDIPINE 30 MG/1
30 TABLET, FILM COATED, EXTENDED RELEASE ORAL DAILY
Qty: 90 TAB | Refills: 3 | Status: SHIPPED | OUTPATIENT
Start: 2019-09-23 | End: 2020-10-01

## 2019-09-23 RX ORDER — TRIAMCINOLONE ACETONIDE 1 MG/G
CREAM TOPICAL
Refills: 1 | COMMUNITY
Start: 2019-09-11

## 2019-09-23 RX ORDER — DICLOFENAC SODIUM 10 MG/G
GEL TOPICAL
Refills: 3 | COMMUNITY
Start: 2019-09-12

## 2019-09-23 NOTE — PROGRESS NOTES
Arvind Pak is a 67 y.o. female    Chief Complaint   Patient presents with    Follow-up     1 mo f/u    Shortness of Breath    Cholesterol Problem     HLD    Hypertension    Coronary Artery Disease    Other     Nuc. stress test 9/16/19         Visit Vitals  /82 (BP 1 Location: Left arm, BP Patient Position: Sitting)   Pulse 72   Resp 16   Ht 4' 8\" (1.422 m)   Wt 134 lb (60.8 kg)   SpO2 98%   BMI 30.04 kg/m²       1. Have you been to the ER, urgent care clinic since your last visit? Hospitalized since your last visit? NO    2. Have you seen or consulted any other health care providers outside of the [address] [place] [address] since your last visit? Include any pap smears or colon screening.   NO

## 2019-09-23 NOTE — PROGRESS NOTES
Danette Hunt MD. [facility] [place]              Patient: Inderjit Casillas  : 1940      Today's Date: 2019              HISTORY OF PRESENT ILLNESS:     History of Present Illness:  Here for follow-up. Doing well overall. Just has twinges of chest pain. Has some shoulder pain that sounds msktl. Breathing OK overall - although SOB walking steps. Is walking more now for exercise. Ankle swelling is better off of nifedipine. PAST MEDICAL HISTORY:     Past Medical History:   Diagnosis Date    Arthritis     CAD (coronary artery disease)     CT Heart Scan 14 - CAC Score 534, (80th%)    Diabetes mellitus ([address]     Diverticulosis     Dyslipidemia     GERD (gastroesophageal reflux disease)     HTN (hypertension)     Obesity     S/P hip replacement     S/P knee replacement     Sleep apnea     on CPAP         Past Surgical History:   Procedure Laterality Date    HX KNEE REPLACEMENT      left, 2013    HX OTHER SURGICAL      CT Heart Scan 14 - CAC Score 534, (80th%). Prominent lymph nodes.  HX OTHER SURGICAL      Exercise cardiolite (14) - walked 3:15 (4.6 METS) - no ischemic EKG changes. Normal MPI (+ breast attenuation). LVEF 70%. MEDICATIONS:     Current Outpatient Medications   Medication Sig Dispense Refill    diclofenac (VOLTAREN) 1 % gel APPLY 2 GM TOPICALLY 4 TIMES DAILY TO THE AFFECTED AREA  3    triamcinolone acetonide (KENALOG) 0.1 % topical cream APPLY CREAM EXTERNALLY TO AFFECTED AREA TWICE DAILY AS NEEDED  1    rosuvastatin (CRESTOR) 20 mg tablet Take 10 mg by mouth nightly.  melatonin 5 mg cap capsule Take 5 mg by mouth nightly.  triamterene-hydroCHLOROthiazide (MAXZIDE) 37.5-25 mg per tablet Take 1 Tab by mouth daily. 30 Tab 12    carvedilol (COREG) 3.125 mg tablet TAKE 1 TABLET BY MOUTH TWICE DAILY WITH MEALS 180 Tab 2    metFORMIN (GLUCOPHAGE) 500 mg tablet Take  by mouth two (2) times daily (with meals).       latanoprost (XALATAN) 0.005 % ophthalmic solution Administer 1 Drop to both eyes nightly.  cyclobenzaprine (FLEXERIL) 10 mg tablet Take  by mouth as needed.  multivitamins-minerals-lutein (CENTRUM SILVER) tab tablet Take 1 Tab by mouth daily.  loperamide (IMODIUM) 1 mg/5 mL solution Take  by mouth as needed for Diarrhea.  Cholecalciferol, Vitamin D3, (VITAMIN D3) 1,000 unit cap Take  by mouth daily.  fexofenadine (ALLEGRA) 180 mg tablet Take  by mouth daily as needed.  aspirin 81 mg chewable tablet Take 1 Tab by mouth daily. 30 Tab 11       Allergies   Allergen Reactions    Animal Dander Other (comments)     Cats.  Stuffy nose    Darvon [Propoxyphene] Nausea Only    Hay Fever And Allergy Relief Cough and Sneezing    Mold Other (comments)     Stuff nose             SOCIAL HISTORY:     Social History     Tobacco Use    Smoking status: Never Smoker    Smokeless tobacco: Never Used   Substance Use Topics    Alcohol use: Yes     Comment: 1 glass of wine daily     Drug use: No         FAMILY HISTORY:     Family History   Problem Relation Age of Onset    Stroke Mother     COPD Father              REVIEW OF SYSTEMS:        Review of Systems:    Constitutional: Negative for fever, chills    HEENT: Negative for vision changes.    Respiratory: Negative for cough    Cardiovascular: Negative for orthopnea, syncope, and PND.    Gastrointestinal: Negative for abdominal pain or melena    Genitourinary: Negative for dysuria    Musculoskeletal: Negative for myalgias.  + joint pain   Skin: Negative for rash    Heme: No problems bleeding.    Neurological: Negative for speech change and focal weakness.    + diarrhea                    PHYSICAL EXAM:       Physical Exam:  Visit Vitals  /82 (BP 1 Location: Left arm, BP Patient Position: Sitting)   Pulse 72   Resp 16   Ht 4' 8\" (1.422 m)   Wt 134 lb (60.8 kg)   SpO2 98%   BMI 30.04 kg/m²          Patient appears generally well, mood and affect are appropriate and pleasant. HEENT: Hearing intact, non-icteric, normocephalic, atraumatic. Neck Exam: Supple, No JVD  Lung Exam: Clear to auscultation, even breath sounds. Cardiac Exam: Regular rate and rhythm with no murmur  Abdomen: Soft, non-tender   Extremities: Moves all ext well.  No LE edema. Psych: Appropriate affect  Neuro - Grossly intact              LABS / OTHER STUDIES:        Labs 6/29/15 - chol 121, LDL 58, , HDL 43, LDL-P 1045, ApoB 54, CRP 4.6, proBNP 48, Insulin 22, glc 107, A1c 6.5, Cr 0.8, K 4.2, LFT's OK, ASA works 460  Labs 12/16/15 - chol 122, LDL 57, HDL 50, TG 85, Apo B 51, LDL-P 794, CRP 3.5, proBNP 44, A1c 6.1, CMP OK  Labs 7/27/16 - CMP OK (glc 143), TSH 2.1, A1c 6.5, CBC OK, chol 155, HDL 47, LDL 79,   Labs 1/31/17 - CBC OK, TSH 2.2,       Labs 5/7/18 - CMP OK (glc 148), chol 116, , HDL 48, LDL 44, A1c 6.9   Labs 8/18 - CMP OK, chol 116, HDL 48, , LDL 44     Labs 11/29/18 - CMP OK, chol 117, TG 64, LDL 52, HDL 52, CBC OK, A1c 6.3    Component      Latest Ref Rng & Units 8/28/2019           7:57 AM   NT pro-BNP      0 - 738 pg/mL 88     Lab Results   Component Value Date/Time    Sodium 137 08/28/2019 07:57 AM    Potassium 3.7 08/28/2019 07:57 AM    Chloride 94 (L) 08/28/2019 07:57 AM    CO2 26 08/28/2019 07:57 AM    Anion gap 12 12/15/2015 09:20 AM    Glucose 99 08/28/2019 07:57 AM    BUN 10 08/28/2019 07:57 AM    Creatinine 0.64 08/28/2019 07:57 AM    BUN/Creatinine ratio 16 08/28/2019 07:57 AM    GFR est AA 99 08/28/2019 07:57 AM    GFR est non-AA 86 08/28/2019 07:57 AM    Calcium 9.5 08/28/2019 07:57 AM              CARDIAC DIAGNOSTICS:       Cardiac Evaluation Includes:  EKG 4/16/14 - NSR, normal    EKG 6/29/15 - NSR, normal   EKG 12/21/16 - NSR, normal   EKG 11/27/17 - NSR, normal   EKG 11/26/18 - NSR, PRWP          Carotid Dopplers 2/1/17 - mild plaque - 0-49% stenosis bilat   Exercise Cardiolite 4/4/17 - walked 3:16 (4.6 METS).  No CP or ischemic EKG changes. Normal MPI. LVEF 69%  Echo 4/4/17 - mild LVH. LVEF 55-60%      Exercise Cardiolite 9/16/19 - walked 4:32 (5.5 METS), No CP and normal stress EKG. Normal MPI. LVEF 72%. /88 at baseline.               ASSESSMENT AND PLAN:        Assessment and Plan:    1) CAD    - CT Heart Scan 4/17/14 shows CAC Score 534, (80th%)     - CAC score recently (2019) ~ 1200   - Exercise Cardiolite 9/16/19 - walked 4:32 (5.5 METS), No CP and normal stress EKG. Normal MPI. LVEF 72%. - For prevention of CV events, continue ASA 81 and statin . - work on a healthy diet and exercise    - She denies chest pain      2) Fatigue  - I don't think there is a cardiac explanation for symptoms   - Despite making medicine changes (stopping atenolol and switching statin) her symptoms persist  - I asked her to continue to exercise and eat healthy   - she did stop exercising after knee surgery which could have made things worse   - On 8/19 - Rechecking a stress cardiolite as above -- she prefers this approach rather than proceeding with a cardiac cath. Consider a cath if problems persist unexplained - due to more HIGHTOWER, will try and increase diuretics again and see ow she feels. Also hold nifeidpine for edema (and allow us to raise diuretic)   - Exercise Cardiolite 9/16/19 - walked 4:32 (5.5 METS), No CP and normal stress EKG. Normal MPI. LVEF 72%. /88 at baseline.    - On 9/23/19 - no change in fatigue with med changes. Her BP is higher at home but ankle swelling better. BP is high and she would like to resume nifedipine (start at 30 mg daily).   Encourage an exercise program.  Will consider cath for worsening symptoms.       2) SHEREE on CPAP      3) Dyslipidemia -    - cont statin - prior labs OK   - repeat labs with PCP       4) HTN  - she feels tired so stopped atenolol, but this did not help any.   - see above      5) Diabetes  - management per PCP       7) RTC in 3 months.   Patient expressed understanding of the plan - questions were answered. She exercises at [facility] 3 days a week.           Fercho Villa MD, [facility] [address] [place], [address]      [id number] [address]  [address]  [facility], [address] [place] [address]  Ph: [phone]                                [phone]

## 2019-10-17 DIAGNOSIS — E11.9 CONTROLLED TYPE 2 DIABETES MELLITUS WITHOUT COMPLICATION, WITHOUT LONG-TERM CURRENT USE OF INSULIN (HCC): ICD-10-CM

## 2019-10-17 DIAGNOSIS — I10 ESSENTIAL HYPERTENSION: ICD-10-CM

## 2019-10-17 DIAGNOSIS — I25.10 CORONARY ARTERY DISEASE INVOLVING NATIVE CORONARY ARTERY OF NATIVE HEART WITHOUT ANGINA PECTORIS: ICD-10-CM

## 2019-10-17 DIAGNOSIS — R73.03 BORDERLINE DIABETES MELLITUS: ICD-10-CM

## 2019-10-17 DIAGNOSIS — E78.5 HYPERLIPIDEMIA, UNSPECIFIED HYPERLIPIDEMIA TYPE: ICD-10-CM

## 2019-10-17 DIAGNOSIS — R53.82 CHRONIC FATIGUE: ICD-10-CM

## 2019-10-22 RX ORDER — CARVEDILOL 3.12 MG/1
TABLET ORAL
Qty: 180 TAB | Refills: 2 | Status: SHIPPED | OUTPATIENT
Start: 2019-10-22 | End: 2020-08-03

## 2019-10-22 NOTE — TELEPHONE ENCOUNTER
Per Dr. Ran Padilla VO:  YLL:[id number]/13/[id number]  Future Appointments   Date Time Provider Gene Coati   12/11/2019  1:20 PM Maria Isabel Mancia MD [address]     Requested Prescriptions     Pending Prescriptions Disp Refills    carvedilol (COREG) 3.125 mg tablet [Pharmacy Med Name: CARVEDILOL 3.125MG  TAB] 180 Tab 2     Sig: TAKE 1 TABLET BY MOUTH TWICE DAILY WITH MEALS

## 2019-10-24 DIAGNOSIS — I10 ESSENTIAL HYPERTENSION: ICD-10-CM

## 2019-10-24 DIAGNOSIS — E11.9 CONTROLLED TYPE 2 DIABETES MELLITUS WITHOUT COMPLICATION, WITHOUT LONG-TERM CURRENT USE OF INSULIN (HCC): ICD-10-CM

## 2019-10-24 DIAGNOSIS — R73.03 BORDERLINE DIABETES MELLITUS: ICD-10-CM

## 2019-10-24 DIAGNOSIS — R53.82 CHRONIC FATIGUE: ICD-10-CM

## 2019-10-24 DIAGNOSIS — E78.5 HYPERLIPIDEMIA, UNSPECIFIED HYPERLIPIDEMIA TYPE: ICD-10-CM

## 2019-10-24 DIAGNOSIS — I25.10 CORONARY ARTERY DISEASE INVOLVING NATIVE CORONARY ARTERY OF NATIVE HEART WITHOUT ANGINA PECTORIS: ICD-10-CM

## 2019-10-24 NOTE — TELEPHONE ENCOUNTER
Per Dr. Nancy Beltrán VO:  FZX:1/22/91  Future Appointments   Date Time Provider Gene Amaya   12/11/2019  1:20 PM Roxana Max MD [address]     Requested Prescriptions     Pending Prescriptions Disp Refills    carvedilol (COREG) 3.125 mg tablet 180 Tab 2     Sig: TAKE 1 TABLET BY MOUTH TWICE DAILY WITH MEALS

## 2019-10-30 RX ORDER — CARVEDILOL 3.12 MG/1
TABLET ORAL
Qty: 180 TAB | Refills: 2 | OUTPATIENT
Start: 2019-10-30

## 2019-12-30 ENCOUNTER — OFFICE VISIT (OUTPATIENT)
Dept: CARDIOLOGY CLINIC | Age: 79
End: 2019-12-30

## 2019-12-30 VITALS
WEIGHT: 135 LBS | OXYGEN SATURATION: 98 % | HEIGHT: 56 IN | RESPIRATION RATE: 16 BRPM | DIASTOLIC BLOOD PRESSURE: 72 MMHG | SYSTOLIC BLOOD PRESSURE: 122 MMHG | BODY MASS INDEX: 30.37 KG/M2 | HEART RATE: 89 BPM

## 2019-12-30 DIAGNOSIS — E78.5 HYPERLIPIDEMIA, UNSPECIFIED HYPERLIPIDEMIA TYPE: ICD-10-CM

## 2019-12-30 DIAGNOSIS — I10 ESSENTIAL HYPERTENSION: ICD-10-CM

## 2019-12-30 DIAGNOSIS — I45.10 RBBB: ICD-10-CM

## 2019-12-30 DIAGNOSIS — I25.10 CORONARY ARTERY DISEASE INVOLVING NATIVE CORONARY ARTERY OF NATIVE HEART WITHOUT ANGINA PECTORIS: Primary | ICD-10-CM

## 2019-12-30 NOTE — PROGRESS NOTES
Chesley Najjar, MD. [facility] [place]              Patient: Jamia Barrett  : 1940      Today's Date: 2019            HISTORY OF PRESENT ILLNESS:     History of Present Illness:  Here for follow-up. She thinks OK. Still tired though and gets tired climbing stairs. She still does water aerobics 3 days at [facility]. Plans for party soon. Occ CP, but nothing of note - just a slight pinch. No orthopnea. PAST MEDICAL HISTORY:     Past Medical History:   Diagnosis Date    Arthritis     CAD (coronary artery disease)     CT Heart Scan 14 - CAC Score 534, (80th%)    Diabetes mellitus ([facility] [address]     Diverticulosis     Dyslipidemia     GERD (gastroesophageal reflux disease)     HTN (hypertension)     Obesity     S/P hip replacement     S/P knee replacement     Sleep apnea     on CPAP         Past Surgical History:   Procedure Laterality Date    HX KNEE REPLACEMENT      left, 2013    HX OTHER SURGICAL      CT Heart Scan 14 - CAC Score 534, (80th%). Prominent lymph nodes.  HX OTHER SURGICAL      Exercise cardiolite (14) - walked 3:15 (4.6 METS) - no ischemic EKG changes. Normal MPI (+ breast attenuation). LVEF 70%. MEDICATIONS:     Current Outpatient Medications   Medication Sig Dispense Refill    carvedilol (COREG) 3.125 mg tablet TAKE 1 TABLET BY MOUTH TWICE DAILY WITH MEALS 180 Tab 2    diclofenac (VOLTAREN) 1 % gel APPLY 2 GM TOPICALLY 4 TIMES DAILY TO THE AFFECTED AREA  3    triamcinolone acetonide (KENALOG) 0.1 % topical cream APPLY CREAM EXTERNALLY TO AFFECTED AREA TWICE DAILY AS NEEDED  1    NIFEdipine ER (ADALAT CC) 30 mg ER tablet Take 1 Tab by mouth daily. 90 Tab 3    rosuvastatin (CRESTOR) 20 mg tablet Take 10 mg by mouth nightly.  melatonin 5 mg cap capsule Take 5 mg by mouth nightly.  triamterene-hydroCHLOROthiazide (MAXZIDE) 37.5-25 mg per tablet Take 1 Tab by mouth daily.  30 Tab 12    metFORMIN (GLUCOPHAGE) 500 mg tablet Take  by mouth two (2) times daily (with meals).  latanoprost (XALATAN) 0.005 % ophthalmic solution Administer 1 Drop to both eyes nightly.  cyclobenzaprine (FLEXERIL) 10 mg tablet Take  by mouth as needed.  multivitamins-minerals-lutein (CENTRUM SILVER) tab tablet Take 1 Tab by mouth daily.  loperamide (IMODIUM) 1 mg/5 mL solution Take  by mouth as needed for Diarrhea.  Cholecalciferol, Vitamin D3, (VITAMIN D3) 1,000 unit cap Take  by mouth daily.  fexofenadine (ALLEGRA) 180 mg tablet Take  by mouth daily as needed.  aspirin 81 mg chewable tablet Take 1 Tab by mouth daily. 30 Tab 11       Allergies   Allergen Reactions    Animal Dander Other (comments)     Cats.  Stuffy nose    Darvon [Propoxyphene] Nausea Only    Hay Fever And Allergy Relief Cough and Sneezing    Mold Other (comments)     Stuff nose             SOCIAL HISTORY:     Social History     Tobacco Use    Smoking status: Never Smoker    Smokeless tobacco: Never Used   Substance Use Topics    Alcohol use: Yes     Comment: 1 glass of wine daily     Drug use: No           FAMILY HISTORY:     Family History   Problem Relation Age of Onset    Stroke Mother     COPD Father                REVIEW OF SYSTEMS:        Review of Systems:    Constitutional: Negative for fever, chills    HEENT: Negative for vision changes.    Respiratory: Negative for cough    Cardiovascular: Negative for orthopnea, syncope, and PND.    Gastrointestinal: Negative for abdominal pain or melena    Genitourinary: Negative for dysuria    Musculoskeletal: Negative for myalgias.  + joint pain   Skin: Negative for rash    Heme: No problems bleeding.    Neurological: Negative for speech change and focal weakness.    + diarrhea                    PHYSICAL EXAM:       Physical Exam:  Visit Vitals  /72 (BP 1 Location: Left arm, BP Patient Position: Sitting)   Pulse 89   Resp 16   Ht 4' 8\" (1.422 m)   Wt 135 lb (61.2 kg)   SpO2 98%   BMI 30.27 kg/m²    Patient appears generally well, mood and affect are appropriate and pleasant. HEENT: Hearing intact, non-icteric, normocephalic, atraumatic. Neck Exam: Supple, No JVD  Lung Exam: Clear to auscultation, even breath sounds. Cardiac Exam: Regular rate and rhythm with no murmur  Abdomen: Soft, non-tender   Extremities: Moves all ext well.  Mild bilat ankle edema.   Psych: Appropriate affect  Neuro - Grossly intact              LABS / OTHER STUDIES:        Labs 6/29/15 - chol 121, LDL 58, , HDL 43, LDL-P 1045, ApoB 54, CRP 4.6, proBNP 48, Insulin 22, glc 107, A1c 6.5, Cr 0.8, K 4.2, LFT's OK, ASA works 460  Labs 12/16/15 - chol 122, LDL 57, HDL 50, TG 85, Apo B 51, LDL-P 794, CRP 3.5, proBNP 44, A1c 6.1, CMP OK  Labs 7/27/16 - CMP OK (glc 143), TSH 2.1, A1c 6.5, CBC OK, chol 155, HDL 47, LDL 79,   Labs 1/31/17 - CBC OK, TSH 2.2,       Labs 5/7/18 - CMP OK (glc 148), chol 116, , HDL 48, LDL 44, A1c 6.9   Labs 8/18 - CMP OK, chol 116, HDL 48, , LDL 44     Labs 11/29/18 - CMP OK, chol 117, TG 64, LDL 52, HDL 52, CBC OK, A1c 6.3     Component      Latest Ref Rng & Units 8/28/2019           7:57 AM   NT pro-BNP      0 - 738 pg/mL 88            Lab Results   Component Value Date/Time     Sodium 137 08/28/2019 07:57 AM     Potassium 3.7 08/28/2019 07:57 AM     Chloride 94 (L) 08/28/2019 07:57 AM     CO2 26 08/28/2019 07:57 AM     Anion gap 12 12/15/2015 09:20 AM     Glucose 99 08/28/2019 07:57 AM     BUN 10 08/28/2019 07:57 AM     Creatinine 0.64 08/28/2019 07:57 AM     BUN/Creatinine ratio 16 08/28/2019 07:57 AM     GFR est AA 99 08/28/2019 07:57 AM     GFR est non-AA 86 08/28/2019 07:57 AM     Calcium 9.5 08/28/2019 07:57 AM                CARDIAC DIAGNOSTICS:       Cardiac Evaluation Includes:  EKG 4/16/14 - NSR, normal    EKG 6/29/15 - NSR, normal   EKG 12/21/16 - NSR, normal   EKG 11/27/17 - NSR, normal   EKG 11/26/18 - NSR, PRWP  EKG 12/30/19 - NSR, RBBB          Carotid Dopplers 2/1/17 - mild plaque - 0-49% stenosis bilat   Exercise Cardiolite 4/4/17 - walked 3:16 (4.6 METS). No CP or ischemic EKG changes. Normal MPI. LVEF 69%  Echo 4/4/17 - mild LVH. LVEF 55-60%      Exercise Cardiolite 9/16/19 - walked 4:32 (5.5 METS), No CP and normal stress EKG.  Normal MPI.  LVEF 72%.   /88 at baseline.               ASSESSMENT AND PLAN:        Assessment and Plan:    1) CAD    - CT Heart Scan 4/17/14 shows CAC Score 534, (80th%)     - CAC score recently (2019) ~ 1200   - Exercise Cardiolite 9/16/19 - walked 4:32 (5.5 METS), No CP and normal stress EKG.  Normal MPI.  LVEF 72%.    - For prevention of CV events, continue ASA 81 and statin . - work on a healthy diet and exercise    - She denies anginal chest pain, but is tired      2) Fatigue  - I don't think there is a cardiac explanation for symptoms   - Despite making medicine changes (stopping atenolol and switching statin) her symptoms persist  - I asked her to continue to exercise and eat healthy   - she did stop exercising after knee surgery which could have made things worse   - Exercise Cardiolite 9/16/19 - walked 4:32 (5.5 METS), No CP and normal stress EKG.  Normal MPI.  LVEF 72%.   /88 at baseline.    - On 9/23/19 - no change in fatigue with med changes. Her BP is higher at home but ankle swelling better. BP is high and she would like to resume nifedipine (start at 30 mg daily). Encourage an exercise program.  Will consider cath for worsening symptoms.   - Given RBBB on EKG, recheck an echo       2) SHEREE on CPAP      3) Dyslipidemia -    - cont statin - prior labs OK   - repeat labs with PCP       4) HTN  - she feels tired so stopped atenolol, but this did not help any.   - see above      5) Diabetes  - management per PCP       7) RTC in 6 months.   Patient expressed understanding of the plan - questions were answered.    She exercises at [facility] 3 days a week.  Has 3 cats.          Lucrecia Floyd MD, [address] [facility] [address]  [address] Cristal Harvey, [address] [facility] [address]      [id number]  [facility], [address] [place] [address]  Ph: [phone]                                [phone]       ADDENDUM   1/19/2020  Echo 1/16/20 - LVEF 62%    Will have nurse call with stable echo findings - normal heart function.        ADDENDUM   8/13/2020  Labs 8/5/20 - CMP OK, chol 141, HDL 67, LDL 59, TG 73

## 2019-12-30 NOTE — PROGRESS NOTES
Fernandez Cruz is a 67 y.o. female    Chief Complaint   Patient presents with    Follow-up     3 mo f/u    Hypertension    Coronary Artery Disease     Pt states having some SOB with exertion. Visit Vitals  /72 (BP 1 Location: Left arm, BP Patient Position: Sitting)   Pulse 89   Resp 16   Ht 4' 8\" (1.422 m)   Wt 135 lb (61.2 kg)   SpO2 98%   BMI 30.27 kg/m²       1. Have you been to the ER, urgent care clinic since your last visit? Hospitalized since your last visit? No    2. Have you seen or consulted any other health care providers outside of the [address] [place] [address] since your last visit? Include any pap smears or colon screening.  No

## 2020-01-20 ENCOUNTER — TELEPHONE (OUTPATIENT)
Dept: CARDIOLOGY CLINIC | Age: 80
End: 2020-01-20

## 2020-07-27 DIAGNOSIS — R53.82 CHRONIC FATIGUE: ICD-10-CM

## 2020-07-27 DIAGNOSIS — R73.03 BORDERLINE DIABETES MELLITUS: ICD-10-CM

## 2020-07-27 DIAGNOSIS — I10 ESSENTIAL HYPERTENSION: ICD-10-CM

## 2020-07-27 DIAGNOSIS — E11.9 CONTROLLED TYPE 2 DIABETES MELLITUS WITHOUT COMPLICATION, WITHOUT LONG-TERM CURRENT USE OF INSULIN (HCC): ICD-10-CM

## 2020-07-27 DIAGNOSIS — E78.5 HYPERLIPIDEMIA, UNSPECIFIED HYPERLIPIDEMIA TYPE: ICD-10-CM

## 2020-07-27 DIAGNOSIS — I25.10 CORONARY ARTERY DISEASE INVOLVING NATIVE CORONARY ARTERY OF NATIVE HEART WITHOUT ANGINA PECTORIS: ICD-10-CM

## 2020-08-03 RX ORDER — CARVEDILOL 3.12 MG/1
TABLET ORAL
Qty: 180 TAB | Refills: 0 | Status: SHIPPED | OUTPATIENT
Start: 2020-08-03 | End: 2020-11-11

## 2020-08-03 NOTE — TELEPHONE ENCOUNTER
Per Dr. Lamberto Betancourt VO:  LOV:12/30/2019  Future Appointments   Date Time Provider Gene Amaya   8/26/2020  2:00 PM MD VIOLA Barrientos AMB     Requested Prescriptions     Pending Prescriptions Disp Refills    carvediloL (COREG) 3.125 mg tablet [Pharmacy Med Name: Carvedilol 3.125 MG Oral Tablet] 180 Tab 0     Sig: TAKE 1 TABLET BY MOUTH TWICE DAILY WITH MEALS

## 2020-08-26 ENCOUNTER — OFFICE VISIT (OUTPATIENT)
Dept: CARDIOLOGY CLINIC | Age: 80
End: 2020-08-26
Payer: MEDICARE

## 2020-08-26 VITALS
BODY MASS INDEX: 31.05 KG/M2 | DIASTOLIC BLOOD PRESSURE: 60 MMHG | WEIGHT: 138 LBS | SYSTOLIC BLOOD PRESSURE: 126 MMHG | OXYGEN SATURATION: 96 % | HEIGHT: 56 IN | HEART RATE: 85 BPM

## 2020-08-26 DIAGNOSIS — E78.5 HYPERLIPIDEMIA, UNSPECIFIED HYPERLIPIDEMIA TYPE: ICD-10-CM

## 2020-08-26 DIAGNOSIS — I10 ESSENTIAL HYPERTENSION: ICD-10-CM

## 2020-08-26 DIAGNOSIS — I25.10 CORONARY ARTERY DISEASE INVOLVING NATIVE CORONARY ARTERY OF NATIVE HEART WITHOUT ANGINA PECTORIS: Primary | ICD-10-CM

## 2020-08-26 PROCEDURE — G8754 DIAS BP LESS 90: HCPCS | Performed by: SPECIALIST

## 2020-08-26 PROCEDURE — 1101F PT FALLS ASSESS-DOCD LE1/YR: CPT | Performed by: SPECIALIST

## 2020-08-26 PROCEDURE — G0463 HOSPITAL OUTPT CLINIC VISIT: HCPCS | Performed by: SPECIALIST

## 2020-08-26 PROCEDURE — G8427 DOCREV CUR MEDS BY ELIG CLIN: HCPCS | Performed by: SPECIALIST

## 2020-08-26 PROCEDURE — 1090F PRES/ABSN URINE INCON ASSESS: CPT | Performed by: SPECIALIST

## 2020-08-26 PROCEDURE — G8432 DEP SCR NOT DOC, RNG: HCPCS | Performed by: SPECIALIST

## 2020-08-26 PROCEDURE — 99214 OFFICE O/P EST MOD 30 MIN: CPT | Performed by: SPECIALIST

## 2020-08-26 PROCEDURE — G8752 SYS BP LESS 140: HCPCS | Performed by: SPECIALIST

## 2020-08-26 PROCEDURE — G8536 NO DOC ELDER MAL SCRN: HCPCS | Performed by: SPECIALIST

## 2020-08-26 PROCEDURE — G8400 PT W/DXA NO RESULTS DOC: HCPCS | Performed by: SPECIALIST

## 2020-08-26 PROCEDURE — G8417 CALC BMI ABV UP PARAM F/U: HCPCS | Performed by: SPECIALIST

## 2020-08-26 RX ORDER — LISINOPRIL 2.5 MG/1
TABLET ORAL DAILY
COMMUNITY
End: 2021-09-13 | Stop reason: SDUPTHER

## 2020-08-26 RX ORDER — ROSUVASTATIN CALCIUM 10 MG/1
10 TABLET, COATED ORAL
Qty: 90 TAB | Refills: 3 | Status: SHIPPED | OUTPATIENT
Start: 2020-08-26 | End: 2021-10-06

## 2020-08-26 RX ORDER — BRIMONIDINE TARTRATE, TIMOLOL MALEATE 2; 5 MG/ML; MG/ML
1 SOLUTION/ DROPS OPHTHALMIC EVERY 12 HOURS
COMMUNITY

## 2020-08-26 NOTE — PROGRESS NOTES
Alfa Villegas is a 79 y.o. female    Visit Vitals  /60 (BP 1 Location: Left arm, BP Patient Position: Sitting)   Pulse 85   Ht 4' 8\" (1.422 m)   Wt 138 lb (62.6 kg)   SpO2 96%   BMI 30.94 kg/m²       Chief Complaint   Patient presents with    Coronary Artery Disease    Hypertension    Cholesterol Problem    Other     RBBB       Chest pain NO  SOB NO  Dizziness NO  Swelling NO  Recent hospital visit NO  Refills NO

## 2020-08-26 NOTE — PROGRESS NOTES
Magnolia Hall MD. [facility] [place]              Patient: Ramirez Crawford  : 1940      Today's Date: 2020          HISTORY OF PRESENT ILLNESS:     History of Present Illness:  She gets tired easily after working out - such as tired after working out 2.5 hours. Not working out at [facility] as she used to. Has had problems sinus sinus allergies. She has slight CP twinges but no sig CP. Has problems with back pain, despite injections. PAST MEDICAL HISTORY:     Past Medical History:   Diagnosis Date    Arthritis     CAD (coronary artery disease)     CT Heart Scan 14 - CAC Score 534, (80th%)    Diabetes mellitus ([facility] [address]     Diverticulosis     Dyslipidemia     GERD (gastroesophageal reflux disease)     HTN (hypertension)     Obesity     S/P hip replacement     S/P knee replacement     Sleep apnea     on CPAP       Past Surgical History:   Procedure Laterality Date    HX KNEE REPLACEMENT      left, 2013    HX OTHER SURGICAL      CT Heart Scan 14 - CAC Score 534, (80th%). Prominent lymph nodes.  HX OTHER SURGICAL      Exercise cardiolite (14) - walked 3:15 (4.6 METS) - no ischemic EKG changes. Normal MPI (+ breast attenuation). LVEF 70%. MEDICATIONS:     Current Outpatient Medications   Medication Sig Dispense Refill    brimonidine-timoloL (Combigan) 0.2-0.5 % drop ophthalmic solution 1 Drop every twelve (12) hours.  lisinopriL (PRINIVIL, ZESTRIL) 2.5 mg tablet Take  by mouth daily.  carvediloL (COREG) 3.125 mg tablet TAKE 1 TABLET BY MOUTH TWICE DAILY WITH MEALS 180 Tab 0    diclofenac (VOLTAREN) 1 % gel APPLY 2 GM TOPICALLY 4 TIMES DAILY TO THE AFFECTED AREA  3    triamcinolone acetonide (KENALOG) 0.1 % topical cream APPLY CREAM EXTERNALLY TO AFFECTED AREA TWICE DAILY AS NEEDED  1    NIFEdipine ER (ADALAT CC) 30 mg ER tablet Take 1 Tab by mouth daily. 90 Tab 3    rosuvastatin (CRESTOR) 20 mg tablet Take 10 mg by mouth nightly.       melatonin 5 mg cap capsule Take 5 mg by mouth nightly.  triamterene-hydroCHLOROthiazide (MAXZIDE) 37.5-25 mg per tablet Take 1 Tab by mouth daily. 30 Tab 12    metFORMIN (GLUCOPHAGE) 500 mg tablet Take  by mouth two (2) times daily (with meals).  latanoprost (XALATAN) 0.005 % ophthalmic solution Administer 1 Drop to both eyes nightly.  cyclobenzaprine (FLEXERIL) 10 mg tablet Take  by mouth as needed.  multivitamins-minerals-lutein (CENTRUM SILVER) tab tablet Take 1 Tab by mouth daily.  loperamide (IMODIUM) 1 mg/5 mL solution Take  by mouth as needed for Diarrhea.  Cholecalciferol, Vitamin D3, (VITAMIN D3) 1,000 unit cap Take  by mouth daily.  fexofenadine (ALLEGRA) 180 mg tablet Take  by mouth daily as needed.  aspirin 81 mg chewable tablet Take 1 Tab by mouth daily. 30 Tab 11       Allergies   Allergen Reactions    Animal Dander Other (comments)     Cats. Stuffy nose    Darvon [Propoxyphene] Nausea Only    Hay Fever And Allergy Relief Cough and Sneezing    Mold Other (comments)     Stuff nose           SOCIAL HISTORY:     Social History     Tobacco Use    Smoking status: Never Smoker    Smokeless tobacco: Never Used   Substance Use Topics    Alcohol use: Yes     Comment: 1 glass of wine daily     Drug use:  No           REVIEW OF SYSTEMS:        Review of Systems:    Constitutional: Negative for fever, chills    HEENT: Glaucoma is worse    Respiratory: Negative for cough    Cardiovascular: Negative for orthopnea, syncope, and PND.    Gastrointestinal: Negative for abdominal pain or melena    Genitourinary: Negative for dysuria    Musculoskeletal: Negative for myalgias.  + joint pain   Skin: Negative for rash    Heme: No problems bleeding.    Neurological: Negative for speech change and focal weakness.    + diarrhea                    PHYSICAL EXAM:       Physical Exam:  Visit Vitals  /60 (BP 1 Location: Left arm, BP Patient Position: Sitting)   Pulse 85   Ht 4' 8\" (1.422 m)   Wt 138 lb (62.6 kg)   SpO2 96%   BMI 30.94 kg/m²          Patient appears generally well, mood and affect are appropriate and pleasant. HEENT: Hearing intact, non-icteric, normocephalic, atraumatic. Neck Exam: Supple, No JVD or bruits  Lung Exam: Clear to auscultation, even breath sounds. Cardiac Exam: Regular rate and rhythm with no murmur  Abdomen: Soft, non-tender   Extremities: Moves all ext well.  No LE edema  2+ DP's  Psych: Appropriate affect  Neuro - Grossly intact              LABS / OTHER STUDIES:          Labs 8/5/20 - CMP OK, chol 141, HDL 67, LDL 59, TG 73          CARDIAC DIAGNOSTICS:       Cardiac Evaluation Includes:  EKG 4/16/14 - NSR, normal    EKG 6/29/15 - NSR, normal   EKG 12/21/16 - NSR, normal   EKG 11/27/17 - NSR, normal   EKG 11/26/18 - NSR, PRWP  EKG 12/30/19 - NSR, RBBB          Carotid Dopplers 2/1/17 - mild plaque - 0-49% stenosis bilat   Exercise Cardiolite 4/4/17 - walked 3:16 (4.6 METS). No CP or ischemic EKG changes. Normal MPI. LVEF 69%  Echo 4/4/17 - mild LVH. LVEF 55-60%      Exercise Cardiolite 9/16/19 - walked 4:32 (5.5 METS), No CP and normal stress EKG.  Normal MPI.  LVEF 72%.   /88 at baseline.       Echo 1/16/20 - LVEF 62%            ASSESSMENT AND PLAN:        Assessment and Plan:    1) CAD    - CT Heart Scan 4/17/14 shows CAC Score 534, (80th%)     - CAC score recently (2019) ~ 1200   - Exercise Cardiolite 9/16/19 - walked 4:32 (5.5 METS), No CP and normal stress EKG.  Normal MPI.  LVEF 72%.    - For prevention of CV events, continue ASA 81 and statin . - work on a healthy diet and exercise    - She denies anginal chest pain, but is tired      2) Fatigue  - I don't think there is a cardiac explanation for symptoms   - Despite making medicine changes (stopping atenolol and switching statin) her symptoms persist  - I asked her to continue to exercise and eat healthy   - she did stop exercising after knee surgery which could have made things worse.   Also has back problems.   - Exercise Cardiolite 9/16/19 - walked 4:32 (5.5 METS), No CP and normal stress EKG.  Normal MPI.  LVEF 72%.   /88 at baseline.    - Echo 1/16/20 - LVEF 62%      2) SHEREE on CPAP      3) Dyslipidemia -    - cont statin - prior labs OK   - labs followed by PCP       4) HTN  - BP looks OK   - continue meds      5) Diabetes  - management per PCP       7) RTC in 6 months.   Patient expressed understanding of the plan - questions were answered. She exercises at [facility] 3 days a week.  Has 3 cats.          Antony Saavedra MD, [address]  [address] [place] [address] Georgee Kassidy Terry, [address] [facility] [address]      [address] [facility]  [address]  [facility], [address] [place] [address]  Ph: [phone]                               [phone]

## 2020-09-05 DIAGNOSIS — R06.02 SOB (SHORTNESS OF BREATH): ICD-10-CM

## 2020-09-08 RX ORDER — TRIAMTERENE/HYDROCHLOROTHIAZID 37.5-25 MG
TABLET ORAL
Qty: 90 TAB | Refills: 1 | Status: SHIPPED | OUTPATIENT
Start: 2020-09-08 | End: 2021-03-17

## 2020-09-08 NOTE — TELEPHONE ENCOUNTER
Per Dr. Pettit Service VO:  LOV:8/26/20  Future Appointments   Date Time Provider Gene Cotai   3/8/2021  1:00 PM MD VIOLA Szymanski BS AMB     Requested Prescriptions     Pending Prescriptions Disp Refills    triamterene-hydroCHLOROthiazide (MAXZIDE) 37.5-25 mg per tablet [Pharmacy Med Name: Triamterene-HCTZ 37.5-25 MG Oral Tablet] 30 Tab 0     Sig: Take 1 tablet by mouth once daily

## 2020-10-01 RX ORDER — NIFEDIPINE 30 MG/1
TABLET, FILM COATED, EXTENDED RELEASE ORAL
Qty: 90 TAB | Refills: 1 | Status: SHIPPED | OUTPATIENT
Start: 2020-10-01 | End: 2021-04-21 | Stop reason: SDUPTHER

## 2020-10-01 NOTE — TELEPHONE ENCOUNTER
Per Dr. Iggy Mccormack VO:  LOV:8/26/20  Future Appointments   Date Time Provider Gene Amaya   3/8/2021  1:00 PM  Neighbor, MD ROD BS AMB     Requested Prescriptions     Pending Prescriptions Disp Refills    NIFEdipine ER (ADALAT CC) 30 mg ER tablet [Pharmacy Med Name: NIFEdipine ER 30 MG Oral Tablet Extended Release 24 Hour] 90 Tab 0     Sig: Take 1 tablet by mouth once daily

## 2020-11-07 DIAGNOSIS — E11.9 CONTROLLED TYPE 2 DIABETES MELLITUS WITHOUT COMPLICATION, WITHOUT LONG-TERM CURRENT USE OF INSULIN (HCC): ICD-10-CM

## 2020-11-07 DIAGNOSIS — I10 ESSENTIAL HYPERTENSION: ICD-10-CM

## 2020-11-07 DIAGNOSIS — I25.10 CORONARY ARTERY DISEASE INVOLVING NATIVE CORONARY ARTERY OF NATIVE HEART WITHOUT ANGINA PECTORIS: ICD-10-CM

## 2020-11-07 DIAGNOSIS — E78.5 HYPERLIPIDEMIA, UNSPECIFIED HYPERLIPIDEMIA TYPE: ICD-10-CM

## 2020-11-07 DIAGNOSIS — R53.82 CHRONIC FATIGUE: ICD-10-CM

## 2020-11-07 DIAGNOSIS — R73.03 BORDERLINE DIABETES MELLITUS: ICD-10-CM

## 2020-11-11 RX ORDER — CARVEDILOL 3.12 MG/1
TABLET ORAL
Qty: 180 TAB | Refills: 1 | Status: SHIPPED | OUTPATIENT
Start: 2020-11-11 | End: 2021-05-25 | Stop reason: SDUPTHER

## 2020-11-11 NOTE — TELEPHONE ENCOUNTER
Refill per VO of Dr. Roark Klinefelter:  Last appt: 8/26/20  Future Appointments   Date Time Provider Gene Monique   3/8/2021  1:00 PM Leah Goodpasture, MD CAVIR BS AMB       Requested Prescriptions     Pending Prescriptions Disp Refills    carvediloL (COREG) 3.125 mg tablet [Pharmacy Med Name: Carvedilol 3.125 MG Oral Tablet] 180 Tab 0     Sig: TAKE 1 TABLET BY MOUTH TWICE DAILY WITH MEALS

## 2021-03-08 ENCOUNTER — OFFICE VISIT (OUTPATIENT)
Dept: CARDIOLOGY CLINIC | Age: 81
End: 2021-03-08
Payer: MEDICARE

## 2021-03-08 VITALS
RESPIRATION RATE: 16 BRPM | DIASTOLIC BLOOD PRESSURE: 78 MMHG | OXYGEN SATURATION: 98 % | HEIGHT: 56 IN | HEART RATE: 88 BPM | SYSTOLIC BLOOD PRESSURE: 136 MMHG | WEIGHT: 136 LBS | BODY MASS INDEX: 30.59 KG/M2

## 2021-03-08 DIAGNOSIS — I25.10 CORONARY ARTERY DISEASE INVOLVING NATIVE CORONARY ARTERY OF NATIVE HEART WITHOUT ANGINA PECTORIS: Primary | ICD-10-CM

## 2021-03-08 DIAGNOSIS — M79.606 PAIN OF LOWER EXTREMITY, UNSPECIFIED LATERALITY: ICD-10-CM

## 2021-03-08 DIAGNOSIS — L81.9 DISCOLORATION OF SKIN OF LOWER LEG: ICD-10-CM

## 2021-03-08 DIAGNOSIS — I10 ESSENTIAL HYPERTENSION: ICD-10-CM

## 2021-03-08 DIAGNOSIS — R53.83 FATIGUE, UNSPECIFIED TYPE: ICD-10-CM

## 2021-03-08 PROCEDURE — G8752 SYS BP LESS 140: HCPCS | Performed by: SPECIALIST

## 2021-03-08 PROCEDURE — G8400 PT W/DXA NO RESULTS DOC: HCPCS | Performed by: SPECIALIST

## 2021-03-08 PROCEDURE — 1090F PRES/ABSN URINE INCON ASSESS: CPT | Performed by: SPECIALIST

## 2021-03-08 PROCEDURE — G8754 DIAS BP LESS 90: HCPCS | Performed by: SPECIALIST

## 2021-03-08 PROCEDURE — 93010 ELECTROCARDIOGRAM REPORT: CPT | Performed by: SPECIALIST

## 2021-03-08 PROCEDURE — G8427 DOCREV CUR MEDS BY ELIG CLIN: HCPCS | Performed by: SPECIALIST

## 2021-03-08 PROCEDURE — G8536 NO DOC ELDER MAL SCRN: HCPCS | Performed by: SPECIALIST

## 2021-03-08 PROCEDURE — G8432 DEP SCR NOT DOC, RNG: HCPCS | Performed by: SPECIALIST

## 2021-03-08 PROCEDURE — 1101F PT FALLS ASSESS-DOCD LE1/YR: CPT | Performed by: SPECIALIST

## 2021-03-08 PROCEDURE — G8417 CALC BMI ABV UP PARAM F/U: HCPCS | Performed by: SPECIALIST

## 2021-03-08 PROCEDURE — 93005 ELECTROCARDIOGRAM TRACING: CPT | Performed by: SPECIALIST

## 2021-03-08 PROCEDURE — 99214 OFFICE O/P EST MOD 30 MIN: CPT | Performed by: SPECIALIST

## 2021-03-08 PROCEDURE — G0463 HOSPITAL OUTPT CLINIC VISIT: HCPCS | Performed by: SPECIALIST

## 2021-03-08 NOTE — PROGRESS NOTES
Bayron Santoyo MD. [facility]              Patient: Adi Mayers  : 1940      Today's Date: 3/8/2021          HISTORY OF PRESENT ILLNESS:     History of Present Illness:    Here for follow-up. Still having back problems and problems of leg pain -- back injections have helped. She does feel tired often - is not exercising like she was before COVID. Some HIGHTOWER climbing stairs fast.            PAST MEDICAL HISTORY:     Past Medical History:   Diagnosis Date    Arthritis     CAD (coronary artery disease)     CT Heart Scan 14 - CAC Score 534, (80th%)    Diabetes mellitus ([address]     Diverticulosis     Dyslipidemia     GERD (gastroesophageal reflux disease)     HTN (hypertension)     Obesity     S/P hip replacement     S/P knee replacement     Sleep apnea     on CPAP       Past Surgical History:   Procedure Laterality Date    HX KNEE REPLACEMENT      left, 2013    HX OTHER SURGICAL      CT Heart Scan 14 - CAC Score 534, (80th%). Prominent lymph nodes.  HX OTHER SURGICAL      Exercise cardiolite (14) - walked 3:15 (4.6 METS) - no ischemic EKG changes. Normal MPI (+ breast attenuation). LVEF 70%. MEDICATIONS:     Current Outpatient Medications   Medication Sig Dispense Refill    carvediloL (COREG) 3.125 mg tablet TAKE 1 TABLET BY MOUTH TWICE DAILY WITH MEALS 180 Tab 1    NIFEdipine ER (ADALAT CC) 30 mg ER tablet Take 1 tablet by mouth once daily 90 Tab 1    triamterene-hydroCHLOROthiazide (MAXZIDE) 37.5-25 mg per tablet Take 1 tablet by mouth once daily 90 Tab 1    brimonidine-timoloL (Combigan) 0.2-0.5 % drop ophthalmic solution 1 Drop every twelve (12) hours.  lisinopriL (PRINIVIL, ZESTRIL) 2.5 mg tablet Take  by mouth daily.  rosuvastatin (CRESTOR) 10 mg tablet Take 1 Tab by mouth nightly.  90 Tab 3    diclofenac (VOLTAREN) 1 % gel APPLY 2 GM TOPICALLY 4 TIMES DAILY TO THE AFFECTED AREA  3    triamcinolone acetonide (KENALOG) 0.1 % topical cream APPLY CREAM EXTERNALLY TO AFFECTED AREA TWICE DAILY AS NEEDED  1    melatonin 5 mg cap capsule Take 5 mg by mouth nightly.  metFORMIN (GLUCOPHAGE) 500 mg tablet Take  by mouth two (2) times daily (with meals).  latanoprost (XALATAN) 0.005 % ophthalmic solution Administer 1 Drop to both eyes nightly.  cyclobenzaprine (FLEXERIL) 10 mg tablet Take  by mouth as needed.  multivitamins-minerals-lutein (CENTRUM SILVER) tab tablet Take 1 Tab by mouth daily.  loperamide (IMODIUM) 1 mg/5 mL solution Take  by mouth as needed for Diarrhea.  Cholecalciferol, Vitamin D3, (VITAMIN D3) 1,000 unit cap Take  by mouth daily.  fexofenadine (ALLEGRA) 180 mg tablet Take  by mouth daily as needed.  aspirin 81 mg chewable tablet Take 1 Tab by mouth daily. 30 Tab 11       Allergies   Allergen Reactions    Animal Dander Other (comments)     Cats.  Stuffy nose    Darvon [Propoxyphene] Nausea Only    Hay Fever And Allergy Relief Cough and Sneezing    Mold Other (comments)     Stuff nose           SOCIAL HISTORY:     Social History     Tobacco Use    Smoking status: Never Smoker    Smokeless tobacco: Never Used   Substance Use Topics    Alcohol use: Yes     Comment: 1 glass of wine daily     Drug use: No         FAMILY HISTORY:     Family History   Problem Relation Age of Onset    Stroke Mother     COPD Father              REVIEW OF SYSTEMS:        Review of Systems:    Constitutional: Negative for fever, chills    HEENT: Glaucoma is worse    Respiratory: Negative for cough    Cardiovascular: Negative for orthopnea, syncope, and PND.    Gastrointestinal: Negative for abdominal pain or melena    Genitourinary: Negative for dysuria    Musculoskeletal: Negative for myalgias.  + joint pain   Skin: Negative for rash    Heme: No problems bleeding.    Neurological: Negative for speech change and focal weakness.                   PHYSICAL EXAM:       Physical Exam:  Visit Vitals  /78 (BP 1 Location: Left arm, BP Patient Position: Sitting, BP Cuff Size: Adult)   Pulse 88   Resp 16   Ht 4' 8\" (1.422 m)   Wt 136 lb (61.7 kg)   SpO2 98%   BMI 30.49 kg/m²          Patient appears generally well, mood and affect are appropriate and pleasant. HEENT: Hearing intact, non-icteric, normocephalic, atraumatic. Neck Exam: Supple, No JVD or bruits  Lung Exam: Clear to auscultation, even breath sounds. Cardiac Exam: Regular rate and rhythm with no murmur  Abdomen: Soft, non-tender   Extremities: Moves all ext well.  No LE edema  2+ DP's  Psych: Appropriate affect  Neuro - Grossly intact              LABS / OTHER STUDIES:           Labs 8/5/20 - CMP OK, chol 141, HDL 67, LDL 59, TG 73            LABS / OTHER STUDIES reviewed:       EKG 4/16/14 - NSR, normal    EKG 6/29/15 - NSR, normal   EKG 12/21/16 - NSR, normal   EKG 11/27/17 - NSR, normal   EKG 11/26/18 - NSR, PRWP  EKG 12/30/19 - NSR, RBBB  EKG 3/8/21 - NSR, RBBB          Carotid Dopplers 2/1/17 - mild plaque - 0-49% stenosis bilat   Exercise Cardiolite 4/4/17 - walked 3:16 (4.6 METS). No CP or ischemic EKG changes. Normal MPI. LVEF 69%  Echo 4/4/17 - mild LVH. LVEF 55-60%      Exercise Cardiolite 9/16/19 - walked 4:32 (5.5 METS), No CP and normal stress EKG.  Normal MPI.  LVEF 72%.   /88 at baseline.       Echo 1/16/20 - LVEF 62%             ASSESSMENT AND PLAN:        Assessment and Plan:    1) CAD    - CT Heart Scan 4/17/14 shows CAC Score 534, (80th%)     - CAC score recently (2019) ~ 1200   - Exercise Cardiolite 9/16/19 - walked 4:32 (5.5 METS), No CP and normal stress EKG.  Normal MPI.  LVEF 72%.    - For prevention of CV events, continue ASA 81 and statin .    - work on a healthy diet and exercise    - She denies anginal chest pain, but is tired past year plus   - On 3/8/21 she is feeling more tired and again worried about CAD ---> will recheck an exercise cardiolite (she would like to get that done)      2) Fatigue  - I don't think there is a cardiac explanation for symptoms   - Despite making medicine changes (stopping atenolol and switching statin) her symptoms persist  - Exercise Cardiolite 9/16/19 - walked 4:32 (5.5 METS), No CP and normal stress EKG.  Normal MPI.  LVEF 72%.   /88 at baseline.    - Echo 1/16/20 - LVEF 62%  - I asked her to continue to exercise and eat healthy   - she did stop exercising after knee surgery and then with COVID which could have made things worse. Also has back problems. Have also asked her to speak to PCP about symptoms.       2) SHEREE on CPAP      3) Dyslipidemia -    - cont statin - prior labs OK   - labs followed by PCP       4) HTN  - BP looks OK   - continue meds      5) Diabetes  - management per PCP     6) Skin color in legs are a little off and has some leg pain - she is worried about PAD - check LE PVR's       8) Phone FU after testing. RTC in 6 months.   Patient expressed understanding of the plan - questions were answered. Was exercises at [facility] 3 days a week until [address] [facility].          Anastasiya Chand MD, [address] [place]  [address] [place] [address], [address]      [id number] [place] [address] [address]  [address]  Ph: [phone]                               [phone]       ADDENDUM   3/23/2021  LE PVR's 3/22/21 - normal rest and exercise CARLEE's   Will call     ADDENDUM   3/24/2021  I called with normal results. ADDENDUM   4/7/2021  Exercise Cardiolite 4/2/21 - walked 4:20 (5.6 METS), normal stress EKG.   Normal MPI, LVEF 80%    Will call     ADDENDUM   4/8/2021  I called and left a VM

## 2021-03-08 NOTE — PROGRESS NOTES
Chief Complaint   Patient presents with    Follow-up     6mo     Visit Vitals  /78 (BP 1 Location: Left arm, BP Patient Position: Sitting, BP Cuff Size: Adult)   Pulse 88   Resp 16   Ht 4' 8\" (1.422 m)   Wt 136 lb (61.7 kg)   SpO2 98%   BMI 30.49 kg/m²     Patient presents in office with c/o of fatigue. No refills needed at this time. No recent hospital visits.

## 2021-03-08 NOTE — PROGRESS NOTES
Orders for Check an exercise cardiolite and LE PVR's when possible. I'll see her in 6 months per Dr. Lucho Quintana VO.   Dx: cad, fatigue

## 2021-03-16 DIAGNOSIS — R06.02 SOB (SHORTNESS OF BREATH): ICD-10-CM

## 2021-03-17 RX ORDER — TRIAMTERENE/HYDROCHLOROTHIAZID 37.5-25 MG
TABLET ORAL
Qty: 90 TAB | Refills: 1 | Status: SHIPPED | OUTPATIENT
Start: 2021-03-17 | End: 2021-10-01

## 2021-03-17 NOTE — TELEPHONE ENCOUNTER
Refill per VO of Dr. Vishnu Valdez:  Last appt: 3/8/21  Future Appointments   Date Time Provider Gene Enriquezisti   3/22/2021 12:30 PM NUCLEAR, CHRIS DOBBINS AMB   3/22/2021  3:00 PM VASCULAR, CHRIS DOBBINS AMB   9/13/2021  3:00 PM MD VIOLA Medel BS AMB       Requested Prescriptions     Signed Prescriptions Disp Refills    triamterene-hydroCHLOROthiazide (MAXZIDE) 37.5-25 mg per tablet 90 Tab 1     Sig: Take 1 tablet by mouth once daily     Authorizing Provider: Alex Ryan     Ordering User: Gary Sharif

## 2021-03-22 ENCOUNTER — ANCILLARY PROCEDURE (OUTPATIENT)
Dept: CARDIOLOGY CLINIC | Age: 81
End: 2021-03-22
Payer: MEDICARE

## 2021-03-22 VITALS — HEIGHT: 58 IN | WEIGHT: 136 LBS | BODY MASS INDEX: 28.55 KG/M2

## 2021-03-22 DIAGNOSIS — R20.8 BURNING SENSATION OF LOWER EXTREMITY: ICD-10-CM

## 2021-03-22 DIAGNOSIS — M79.605 LEG PAIN, BILATERAL: ICD-10-CM

## 2021-03-22 DIAGNOSIS — R20.9 SENSATION OF COLD IN LOWER EXTREMITY: ICD-10-CM

## 2021-03-22 DIAGNOSIS — L81.9 DISCOLORATION OF SKIN OF LOWER LEG: ICD-10-CM

## 2021-03-22 DIAGNOSIS — M79.604 LEG PAIN, BILATERAL: ICD-10-CM

## 2021-03-22 PROCEDURE — 93924 LWR XTR VASC STDY BILAT: CPT | Performed by: SPECIALIST

## 2021-03-23 LAB
IMMEDIATE ARM BP: 145 MMHG
IMMEDIATE LEFT ABI: 1.14
IMMEDIATE LEFT TIBIAL: 165 MMHG
IMMEDIATE RIGHT ABI: 1.05
IMMEDIATE RIGHT TIBIAL: 152 MMHG
LEFT ABI: 1.06
LEFT ANTERIOR TIBIAL: 133 MMHG
LEFT ARM BP: 130 MMHG
LEFT CALF PRESSURE: 139 MMHG
LEFT HIGH THIGH PRESSURE: 153 MMHG
LEFT LOW THIGH PRESSURE: 155 MMHG
LEFT POSTERIOR TIBIAL: 138 MMHG
RIGHT ABI: 1.03
RIGHT ANTERIOR TIBIAL: 129 MMHG
RIGHT ARM BP: 126 MMHG
RIGHT CALF PRESSURE: 143 MMHG
RIGHT HIGH THIGH PRESSURE: 160 MMHG
RIGHT LOW THIGH PRESSURE: 149 MMHG
RIGHT POSTERIOR TIBIAL: 134 MMHG

## 2021-04-02 ENCOUNTER — ANCILLARY PROCEDURE (OUTPATIENT)
Dept: CARDIOLOGY CLINIC | Age: 81
End: 2021-04-02
Payer: MEDICARE

## 2021-04-02 VITALS — HEIGHT: 56 IN | BODY MASS INDEX: 30.59 KG/M2 | WEIGHT: 136 LBS

## 2021-04-02 DIAGNOSIS — R53.83 FATIGUE, UNSPECIFIED TYPE: ICD-10-CM

## 2021-04-02 DIAGNOSIS — I10 ESSENTIAL HYPERTENSION: ICD-10-CM

## 2021-04-02 DIAGNOSIS — I25.10 CORONARY ARTERY DISEASE INVOLVING NATIVE CORONARY ARTERY OF NATIVE HEART WITHOUT ANGINA PECTORIS: ICD-10-CM

## 2021-04-02 LAB
STRESS ANGINA INDEX: 0
STRESS BASELINE DIAS BP: 82 MMHG
STRESS BASELINE HR: 75 BPM
STRESS BASELINE SYS BP: 128 MMHG
STRESS ESTIMATED WORKLOAD: 5.6 METS
STRESS EXERCISE DUR MIN: NORMAL MIN:SEC
STRESS O2 SAT PEAK: 98 %
STRESS O2 SAT REST: 99 %
STRESS PEAK DIAS BP: 90 MMHG
STRESS PEAK SYS BP: 176 MMHG
STRESS PERCENT HR ACHIEVED: 91 %
STRESS POST PEAK HR: 129 BPM
STRESS RATE PRESSURE PRODUCT: NORMAL BPM*MMHG
STRESS SR DUKE TREADMILL SCORE: 4
STRESS ST DEPRESSION: 0 MM
STRESS ST ELEVATION: 0 MM
STRESS TARGET HR: 141 BPM

## 2021-04-02 PROCEDURE — A9500 TC99M SESTAMIBI: HCPCS | Performed by: SPECIALIST

## 2021-04-02 PROCEDURE — 93018 CV STRESS TEST I&R ONLY: CPT | Performed by: SPECIALIST

## 2021-04-02 PROCEDURE — 93016 CV STRESS TEST SUPVJ ONLY: CPT | Performed by: SPECIALIST

## 2021-04-02 PROCEDURE — 78452 HT MUSCLE IMAGE SPECT MULT: CPT | Performed by: SPECIALIST

## 2021-04-02 RX ORDER — TETRAKIS(2-METHOXYISOBUTYLISOCYANIDE)COPPER(I) TETRAFLUOROBORATE 1 MG/ML
8.5 INJECTION, POWDER, LYOPHILIZED, FOR SOLUTION INTRAVENOUS ONCE
Status: COMPLETED | OUTPATIENT
Start: 2021-04-02 | End: 2021-04-02

## 2021-04-02 RX ORDER — TETRAKIS(2-METHOXYISOBUTYLISOCYANIDE)COPPER(I) TETRAFLUOROBORATE 1 MG/ML
24.6 INJECTION, POWDER, LYOPHILIZED, FOR SOLUTION INTRAVENOUS ONCE
Status: COMPLETED | OUTPATIENT
Start: 2021-04-02 | End: 2021-04-02

## 2021-04-02 RX ADMIN — TECHNETIUM TC 99M SESTAMIBI 8.5 MILLICURIE: 1 INJECTION, POWDER, FOR SOLUTION INTRAVENOUS at 13:05

## 2021-04-02 RX ADMIN — TECHNETIUM TC 99M SESTAMIBI 24.6 MILLICURIE: 1 INJECTION, POWDER, FOR SOLUTION INTRAVENOUS at 15:00

## 2021-04-21 RX ORDER — NIFEDIPINE 30 MG/1
30 TABLET, FILM COATED, EXTENDED RELEASE ORAL DAILY
Qty: 90 TAB | Refills: 1 | Status: SHIPPED | OUTPATIENT
Start: 2021-04-21 | End: 2021-09-13

## 2021-04-21 NOTE — TELEPHONE ENCOUNTER
Refill per VO of Dr. Oneill Blocker:  Last appt: 3/8/21  Future Appointments   Date Time Provider Gene Amaya   9/13/2021  3:00 PM MD VIOLA White Junior BS AMB       Requested Prescriptions     Signed Prescriptions Disp Refills    NIFEdipine ER (ADALAT CC) 30 mg ER tablet 90 Tab 1     Sig: Take 1 Tab by mouth daily.      Authorizing Provider: Marvin Rinaldi     Ordering User: Michele

## 2021-05-25 DIAGNOSIS — R53.82 CHRONIC FATIGUE: ICD-10-CM

## 2021-05-25 DIAGNOSIS — I25.10 CORONARY ARTERY DISEASE INVOLVING NATIVE CORONARY ARTERY OF NATIVE HEART WITHOUT ANGINA PECTORIS: ICD-10-CM

## 2021-05-25 DIAGNOSIS — I10 ESSENTIAL HYPERTENSION: ICD-10-CM

## 2021-05-25 DIAGNOSIS — R73.03 BORDERLINE DIABETES MELLITUS: ICD-10-CM

## 2021-05-25 DIAGNOSIS — E11.9 CONTROLLED TYPE 2 DIABETES MELLITUS WITHOUT COMPLICATION, WITHOUT LONG-TERM CURRENT USE OF INSULIN (HCC): ICD-10-CM

## 2021-05-25 DIAGNOSIS — E78.5 HYPERLIPIDEMIA, UNSPECIFIED HYPERLIPIDEMIA TYPE: ICD-10-CM

## 2021-05-28 RX ORDER — CARVEDILOL 3.12 MG/1
3.12 TABLET ORAL 2 TIMES DAILY WITH MEALS
Qty: 180 TABLET | Refills: 1 | Status: SHIPPED | OUTPATIENT
Start: 2021-05-28 | End: 2021-12-14

## 2021-05-28 NOTE — TELEPHONE ENCOUNTER
Refill per VO of Dr. Bulmaro Pierce:  Last appt: 3/8/21  Future Appointments   Date Time Provider Gene Amaya   9/13/2021  3:00 PM MD VIOLA Roberson AMB       Requested Prescriptions     Signed Prescriptions Disp Refills    carvediloL (COREG) 3.125 mg tablet 180 Tablet 1     Sig: Take 1 Tablet by mouth two (2) times daily (with meals).      Authorizing Provider: Dmitry Narayanan     Ordering User: Nehemias Lloyd

## 2021-09-13 ENCOUNTER — OFFICE VISIT (OUTPATIENT)
Dept: CARDIOLOGY CLINIC | Age: 81
End: 2021-09-13
Payer: MEDICARE

## 2021-09-13 VITALS
OXYGEN SATURATION: 99 % | HEIGHT: 56 IN | HEART RATE: 84 BPM | DIASTOLIC BLOOD PRESSURE: 76 MMHG | SYSTOLIC BLOOD PRESSURE: 144 MMHG | BODY MASS INDEX: 30.82 KG/M2 | WEIGHT: 137 LBS

## 2021-09-13 DIAGNOSIS — I25.10 CORONARY ARTERY DISEASE INVOLVING NATIVE CORONARY ARTERY OF NATIVE HEART WITHOUT ANGINA PECTORIS: ICD-10-CM

## 2021-09-13 DIAGNOSIS — I10 ESSENTIAL HYPERTENSION: Primary | ICD-10-CM

## 2021-09-13 PROCEDURE — G8536 NO DOC ELDER MAL SCRN: HCPCS | Performed by: SPECIALIST

## 2021-09-13 PROCEDURE — G0463 HOSPITAL OUTPT CLINIC VISIT: HCPCS | Performed by: SPECIALIST

## 2021-09-13 PROCEDURE — G8432 DEP SCR NOT DOC, RNG: HCPCS | Performed by: SPECIALIST

## 2021-09-13 PROCEDURE — G8427 DOCREV CUR MEDS BY ELIG CLIN: HCPCS | Performed by: SPECIALIST

## 2021-09-13 PROCEDURE — 1090F PRES/ABSN URINE INCON ASSESS: CPT | Performed by: SPECIALIST

## 2021-09-13 PROCEDURE — 1101F PT FALLS ASSESS-DOCD LE1/YR: CPT | Performed by: SPECIALIST

## 2021-09-13 PROCEDURE — G8417 CALC BMI ABV UP PARAM F/U: HCPCS | Performed by: SPECIALIST

## 2021-09-13 PROCEDURE — 99214 OFFICE O/P EST MOD 30 MIN: CPT | Performed by: SPECIALIST

## 2021-09-13 PROCEDURE — G8400 PT W/DXA NO RESULTS DOC: HCPCS | Performed by: SPECIALIST

## 2021-09-13 PROCEDURE — G8754 DIAS BP LESS 90: HCPCS | Performed by: SPECIALIST

## 2021-09-13 PROCEDURE — G8753 SYS BP > OR = 140: HCPCS | Performed by: SPECIALIST

## 2021-09-13 RX ORDER — LISINOPRIL 5 MG/1
5 TABLET ORAL
Qty: 90 TABLET | Refills: 3 | Status: SHIPPED | OUTPATIENT
Start: 2021-09-13 | End: 2022-09-13

## 2021-09-13 NOTE — PROGRESS NOTES
Bonnie Diaz is a [de-identified] y.o. female    Visit Vitals  BP (!) 144/76 (BP 1 Location: Left upper arm, BP Patient Position: Sitting, BP Cuff Size: Adult)   Pulse 84   Ht 4' 8\" (1.422 m)   Wt 137 lb (62.1 kg)   SpO2 99%   BMI 30.71 kg/m²       Chief Complaint   Patient presents with    Coronary Artery Disease    Hypertension    Other    Fatigue       Chest pain NO  SOB YES  Dizziness NO  Swelling ANKLES  Recent hospital visit NO  Refills NO  COVID VACCINE STATUS YES

## 2021-09-13 NOTE — PROGRESS NOTES
Faustina Borrero MD. [facility] [place]              Patient: Kaila Marroquin  : 1940      Today's Date: 2021          HISTORY OF PRESENT ILLNESS:     History of Present Illness:    Here for follow-up. Has HIGHTOWER with stairs. Ankles are always swelling. Has had some eye complaints (dry eyes). BP has been OK. PAST MEDICAL HISTORY:     Past Medical History:   Diagnosis Date    Arthritis     CAD (coronary artery disease)     CT Heart Scan 14 - CAC Score 534, (80th%)    Diabetes mellitus ([address]     Diverticulosis     Dyslipidemia     GERD (gastroesophageal reflux disease)     HTN (hypertension)     Obesity     S/P hip replacement     S/P knee replacement     Sleep apnea     on CPAP       Past Surgical History:   Procedure Laterality Date    HX KNEE REPLACEMENT      left, 2013    HX OTHER SURGICAL      CT Heart Scan 14 - CAC Score 534, (80th%). Prominent lymph nodes.  HX OTHER SURGICAL      Exercise cardiolite (14) - walked 3:15 (4.6 METS) - no ischemic EKG changes. Normal MPI (+ breast attenuation). LVEF 70%. MEDICATIONS:     Current Outpatient Medications   Medication Sig Dispense Refill    carvediloL (COREG) 3.125 mg tablet Take 1 Tablet by mouth two (2) times daily (with meals). 180 Tablet 1    NIFEdipine ER (ADALAT CC) 30 mg ER tablet Take 1 Tab by mouth daily. 90 Tab 1    triamterene-hydroCHLOROthiazide (MAXZIDE) 37.5-25 mg per tablet Take 1 tablet by mouth once daily 90 Tab 1    brimonidine-timoloL (Combigan) 0.2-0.5 % drop ophthalmic solution 1 Drop every twelve (12) hours.  lisinopriL (PRINIVIL, ZESTRIL) 2.5 mg tablet Take  by mouth daily.  rosuvastatin (CRESTOR) 10 mg tablet Take 1 Tab by mouth nightly.  90 Tab 3    diclofenac (VOLTAREN) 1 % gel APPLY 2 GM TOPICALLY 4 TIMES DAILY TO THE AFFECTED AREA  3    triamcinolone acetonide (KENALOG) 0.1 % topical cream APPLY CREAM EXTERNALLY TO AFFECTED AREA TWICE DAILY AS NEEDED  1    metFORMIN (GLUCOPHAGE) 500 mg tablet Take  by mouth two (2) times daily (with meals).  latanoprost (XALATAN) 0.005 % ophthalmic solution Administer 1 Drop to both eyes nightly.  multivitamins-minerals-lutein (CENTRUM SILVER) tab tablet Take 1 Tab by mouth daily.  loperamide (IMODIUM) 1 mg/5 mL solution Take  by mouth as needed for Diarrhea.  Cholecalciferol, Vitamin D3, (VITAMIN D3) 1,000 unit cap Take  by mouth daily.  fexofenadine (ALLEGRA) 180 mg tablet Take  by mouth daily as needed.  aspirin 81 mg chewable tablet Take 1 Tab by mouth daily. 30 Tab 11    melatonin 5 mg cap capsule Take 5 mg by mouth nightly. (Patient not taking: Reported on 9/13/2021)      cyclobenzaprine (FLEXERIL) 10 mg tablet Take  by mouth as needed. (Patient not taking: Reported on 9/13/2021)         Allergies   Allergen Reactions    Aller Xt-Tree Pollen-[place] Itching     OAK TREE, RED EYES, ITCHING    Animal Dander Other (comments)     Cats. Stuffy nose    Darvon [Propoxyphene] Nausea Only    Hay Fever And Allergy Relief Cough and Sneezing    Mold Other (comments)     Stuff nose           SOCIAL HISTORY:     Social History     Tobacco Use    Smoking status: Never Smoker    Smokeless tobacco: Never Used   Substance Use Topics    Alcohol use: Yes     Comment: 1 glass of wine daily     Drug use: No         FAMILY HISTORY:     Family History   Problem Relation Age of Onset    Stroke Mother     COPD Father              REVIEW OF SYSTEMS:        Review of Systems:    Constitutional: Negative for fever, chills    HEENT: Glaucoma is worse    Respiratory: Negative for cough    Cardiovascular: Negative for orthopnea, syncope, and PND.    Gastrointestinal: Negative for abdominal pain or melena    Genitourinary: Negative for dysuria    Musculoskeletal: Negative for myalgias.  + joint pain   Skin: Negative for rash    Heme: No problems bleeding.    Neurological: Negative for speech change and focal weakness.                   PHYSICAL EXAM:       Physical Exam:  Visit Vitals  BP (!) 144/76 (BP 1 Location: Left upper arm, BP Patient Position: Sitting, BP Cuff Size: Adult)   Pulse 84   Ht 4' 8\" (1.422 m)   Wt 137 lb (62.1 kg)   SpO2 99%   BMI 30.71 kg/m²          Patient appears generally well, mood and affect are appropriate and pleasant. HEENT: Hearing intact, non-icteric, normocephalic, atraumatic. Neck Exam: Supple, No JVD    Lung Exam: Clear to auscultation, even breath sounds. Cardiac Exam: Regular rate and rhythm with no murmur  Abdomen: Soft, non-tender   Extremities: Moves all ext well.  Trace LE edema  Good ROM  Psych: Appropriate affect  Neuro - Grossly intact              LABS / OTHER STUDIES:           Labs 8/5/20 - CMP OK, chol 141, HDL 67, LDL 59, TG 73   Labs 8/21 - CMP OK, chol 148, LDL 64, TSH 1.8           LABS / OTHER STUDIES reviewed:       EKG 4/16/14 - NSR, normal    EKG 6/29/15 - NSR, normal   EKG 12/21/16 - NSR, normal   EKG 11/27/17 - NSR, normal   EKG 11/26/18 - NSR, PRWP  EKG 12/30/19 - NSR, RBBB  EKG 3/8/21 - NSR, RBBB          Carotid Dopplers 2/1/17 - mild plaque - 0-49% stenosis bilat   Exercise Cardiolite 4/4/17 - walked 3:16 (4.6 METS). No CP or ischemic EKG changes. Normal MPI. LVEF 69%  Echo 4/4/17 - mild LVH. LVEF 55-60%      Exercise Cardiolite 9/16/19 - walked 4:32 (5.5 METS), No CP and normal stress EKG.  Normal MPI.  LVEF 72%.   /88 at baseline.       Echo 1/16/20 - LVEF 62%    LE PVR's 3/22/21 - normal rest and exercise CARLEE's     Exercise Cardiolite 4/2/21 - walked 4:20 (5.6 METS), normal stress EKG. Normal MPI, LVEF 80%             ASSESSMENT AND PLAN:        Assessment and Plan:    1) CAD    - CT Heart Scan 4/17/14 shows CAC Score 534, (80th%)     - CAC score recently (2019) ~ 1200   - Exercise Cardiolite 9/16/19 - walked 4:32 (5.5 METS), No CP and normal stress EKG.  Normal MPI.  LVEF 72%.    - Exercise Cardiolite 4/2/21 - walked 4:20 (5.6 METS), normal stress EKG. Normal MPI, LVEF 80%  - For prevention of CV events, continue ASA 81 and statin . - work on a healthy diet and exercise    - She denies anginal chest pain, but is tired past year plus    - Has stable class 2-3 HIGHTOWER  - can consider a cath for worsening complaints        2) Fatigue  - I don't think her fatigue is cardiac in nature   - Despite making medicine changes (stopping atenolol and switching statin) her symptoms persist  - Exercise Cardiolite 9/16/19 - walked 4:32 (5.5 METS), No CP and normal stress EKG.  Normal MPI.  LVEF 72%.   /88 at baseline.    - Exercise Cardiolite 4/2/21 - walked 4:20 (5.6 METS), normal stress EKG. Normal MPI, LVEF 80%  - I asked her to continue to exercise       2) SHEREE on CPAP      3) Dyslipidemia -    - cont statin - prior labs OK   - labs followed by PCP       4) HTN  - BP looks OK   - On 9/13/21 - Due to mild ankle swelling she would like to try coming off of Nifedipine and we'll increase lisinopril instead ---> follow BP at home. Continue other meds.    5) Diabetes  - management per PCP     6) RTC in 6 months.   Patient expressed understanding of the plan - questions were answered. Was exercises at [facility] 3 days a week until [address] cats.          Nash Sexton MD, [address]  [address] [place] [address] [address], [address]      [address] [facility]  [address]  [facility], [address] [place] [address]  Ph: [phone]                                [phone]

## 2021-09-30 DIAGNOSIS — R06.02 SOB (SHORTNESS OF BREATH): ICD-10-CM

## 2021-10-01 RX ORDER — TRIAMTERENE/HYDROCHLOROTHIAZID 37.5-25 MG
TABLET ORAL
Qty: 90 TABLET | Refills: 3 | Status: SHIPPED | OUTPATIENT
Start: 2021-10-01 | End: 2022-09-13

## 2021-10-01 NOTE — TELEPHONE ENCOUNTER
Refill per VO of Dr. David Flannery  Last appt: 12/30/2019  Future Appointments   Date Time Provider Gene Monique   3/9/2022  1:20 PM MD VIOLA Dickey BS AMB       Requested Prescriptions     Pending Prescriptions Disp Refills    triamterene-hydroCHLOROthiazide (MAXZIDE) 37.5-25 mg per tablet [Pharmacy Med Name: Triamterene-HCTZ 37.5-25 MG Oral Tablet] 90 Tablet 0     Sig: Take 1 tablet by mouth once daily

## 2021-10-06 RX ORDER — ROSUVASTATIN CALCIUM 10 MG/1
TABLET, COATED ORAL
Qty: 90 TABLET | Refills: 3 | Status: SHIPPED | OUTPATIENT
Start: 2021-10-06

## 2021-10-06 NOTE — TELEPHONE ENCOUNTER
Refill per VO of Dr. Irineo Barrios  Last appt: 9/13/2021  Future Appointments   Date Time Provider Gene Amaya   3/9/2022  1:20 PM MD VIOLA Francis AMB       Requested Prescriptions     Pending Prescriptions Disp Refills    rosuvastatin (CRESTOR) 10 mg tablet [Pharmacy Med Name: Rosuvastatin Calcium 10 MG Oral Tablet] 90 Tablet 0     Sig: Take 1 tablet by mouth nightly

## 2021-11-04 RX ORDER — NIFEDIPINE 30 MG/1
TABLET, FILM COATED, EXTENDED RELEASE ORAL
Qty: 90 TABLET | Refills: 0 | OUTPATIENT
Start: 2021-11-04

## 2021-11-04 NOTE — TELEPHONE ENCOUNTER
Refill per VO of Dr. Corey Bragg  Last appt: 12/30/2019  Future Appointments   Date Time Provider Gene Amaya   3/9/2022  1:20 PM MD VIOLA Vernon BS AMB       Requested Prescriptions     Pending Prescriptions Disp Refills    NIFEdipine ER (ADALAT CC) 30 mg ER tablet [Pharmacy Med Name: NIFEdipine ER 30 MG Oral Tablet Extended Release 24 Hour] 90 Tablet 0     Sig: Take 1 tablet by mouth once daily     Refill was denied because,  MED IS D/C'D

## 2021-11-10 NOTE — TELEPHONE ENCOUNTER
Patient was calling because she see that there is a medication that has been discontinued by the provider nifedipine ER. She would like to know if the provider is going to replace the medicine with something else or does she need an appointment to come into the office. Patient would like you to respond on MY CHART.

## 2021-11-11 RX ORDER — NIFEDIPINE 30 MG/1
TABLET, FILM COATED, EXTENDED RELEASE ORAL
Qty: 90 TABLET | Refills: 0 | OUTPATIENT
Start: 2021-11-11

## 2021-11-11 NOTE — TELEPHONE ENCOUNTER
Refill per VO of Dr. Medardo Barkley  Last appt: 12/30/2019  Future Appointments   Date Time Provider Gene Amaya   3/9/2022  1:20 PM MD VIOLA Aguero BS AMB       Requested Prescriptions     Pending Prescriptions Disp Refills    NIFEdipine ER (ADALAT CC) 30 mg ER tablet [Pharmacy Med Name: NIFEdipine ER 30 MG Oral Tablet Extended Release 24 Hour] 90 Tablet 0     Sig: Take 1 tablet by mouth once daily     Refill was denied because,  MED IS D/C'D

## 2021-12-14 DIAGNOSIS — R53.82 CHRONIC FATIGUE: ICD-10-CM

## 2021-12-14 DIAGNOSIS — R73.03 BORDERLINE DIABETES MELLITUS: ICD-10-CM

## 2021-12-14 DIAGNOSIS — I10 ESSENTIAL HYPERTENSION: ICD-10-CM

## 2021-12-14 DIAGNOSIS — E78.5 HYPERLIPIDEMIA, UNSPECIFIED HYPERLIPIDEMIA TYPE: ICD-10-CM

## 2021-12-14 DIAGNOSIS — E11.9 CONTROLLED TYPE 2 DIABETES MELLITUS WITHOUT COMPLICATION, WITHOUT LONG-TERM CURRENT USE OF INSULIN (HCC): ICD-10-CM

## 2021-12-14 DIAGNOSIS — I25.10 CORONARY ARTERY DISEASE INVOLVING NATIVE CORONARY ARTERY OF NATIVE HEART WITHOUT ANGINA PECTORIS: ICD-10-CM

## 2021-12-14 RX ORDER — CARVEDILOL 3.12 MG/1
TABLET ORAL
Qty: 180 TABLET | Refills: 0 | Status: SHIPPED | OUTPATIENT
Start: 2021-12-14 | End: 2022-04-26 | Stop reason: SDUPTHER

## 2021-12-14 NOTE — TELEPHONE ENCOUNTER
Refill per VO of Dr. Patricia Oates  Last appt: 12/30/2019  Future Appointments   Date Time Provider Gene Amaya   3/9/2022  1:20 PM MD VIOLA Arambula BS AMB       Requested Prescriptions     Pending Prescriptions Disp Refills    carvediloL (COREG) 3.125 mg tablet [Pharmacy Med Name: Carvedilol 3.125 MG Oral Tablet] 180 Tablet 0     Sig: TAKE 1 TABLET BY MOUTH TWICE DAILY WITH MEALS

## 2022-02-25 LAB
CREATININE, EXTERNAL: 0.7
HBA1C MFR BLD HPLC: 6.6 %
LDL-C, EXTERNAL: 55

## 2022-03-09 ENCOUNTER — OFFICE VISIT (OUTPATIENT)
Dept: CARDIOLOGY CLINIC | Age: 82
End: 2022-03-09
Payer: MEDICARE

## 2022-03-09 VITALS
SYSTOLIC BLOOD PRESSURE: 178 MMHG | HEIGHT: 56 IN | BODY MASS INDEX: 30.14 KG/M2 | WEIGHT: 134 LBS | DIASTOLIC BLOOD PRESSURE: 70 MMHG | HEART RATE: 72 BPM | OXYGEN SATURATION: 99 %

## 2022-03-09 DIAGNOSIS — R06.02 SOB (SHORTNESS OF BREATH): ICD-10-CM

## 2022-03-09 DIAGNOSIS — I25.10 CORONARY ARTERY DISEASE INVOLVING NATIVE CORONARY ARTERY OF NATIVE HEART WITHOUT ANGINA PECTORIS: Primary | ICD-10-CM

## 2022-03-09 DIAGNOSIS — R53.82 CHRONIC FATIGUE: ICD-10-CM

## 2022-03-09 PROCEDURE — G0463 HOSPITAL OUTPT CLINIC VISIT: HCPCS | Performed by: SPECIALIST

## 2022-03-09 PROCEDURE — G8400 PT W/DXA NO RESULTS DOC: HCPCS | Performed by: SPECIALIST

## 2022-03-09 PROCEDURE — G8753 SYS BP > OR = 140: HCPCS | Performed by: SPECIALIST

## 2022-03-09 PROCEDURE — 1090F PRES/ABSN URINE INCON ASSESS: CPT | Performed by: SPECIALIST

## 2022-03-09 PROCEDURE — G8754 DIAS BP LESS 90: HCPCS | Performed by: SPECIALIST

## 2022-03-09 PROCEDURE — G8536 NO DOC ELDER MAL SCRN: HCPCS | Performed by: SPECIALIST

## 2022-03-09 PROCEDURE — G8427 DOCREV CUR MEDS BY ELIG CLIN: HCPCS | Performed by: SPECIALIST

## 2022-03-09 PROCEDURE — 99214 OFFICE O/P EST MOD 30 MIN: CPT | Performed by: SPECIALIST

## 2022-03-09 PROCEDURE — G8417 CALC BMI ABV UP PARAM F/U: HCPCS | Performed by: SPECIALIST

## 2022-03-09 PROCEDURE — G8510 SCR DEP NEG, NO PLAN REQD: HCPCS | Performed by: SPECIALIST

## 2022-03-09 PROCEDURE — 1101F PT FALLS ASSESS-DOCD LE1/YR: CPT | Performed by: SPECIALIST

## 2022-03-09 RX ORDER — PREDNISOLONE ACETATE 10 MG/ML
1 SUSPENSION/ DROPS OPHTHALMIC 4 TIMES DAILY
COMMUNITY
End: 2022-09-13

## 2022-03-09 RX ORDER — NIFEDIPINE 90 MG/1
90 TABLET, FILM COATED, EXTENDED RELEASE ORAL DAILY
COMMUNITY
End: 2022-09-13

## 2022-03-09 NOTE — PROGRESS NOTES
Ciro Ellis MD. [facility]              Patient: Sandra Wyatt  : 1940      Today's Date: 3/9/2022          HISTORY OF PRESENT ILLNESS:     History of Present Illness:    Here for follow-up. Has HIGHTOWER with stairs. Ankles are always swelling. BP has been OK at home when she checks it. Breathing OK. No CP.  has been ill and she has been stressed. Not exercising as much and more tired. PAST MEDICAL HISTORY:     Past Medical History:   Diagnosis Date    Arthritis     CAD (coronary artery disease)     CT Heart Scan 14 - CAC Score 534, (80th%)    Diabetes mellitus ([address]     Diverticulosis     Dyslipidemia     GERD (gastroesophageal reflux disease)     HTN (hypertension)     Obesity     S/P hip replacement     S/P knee replacement     Sleep apnea     on CPAP       Past Surgical History:   Procedure Laterality Date    HX KNEE REPLACEMENT      left, 2013    HX OTHER SURGICAL      CT Heart Scan 14 - CAC Score 534, (80th%). Prominent lymph nodes.  HX OTHER SURGICAL      Exercise cardiolite (14) - walked 3:15 (4.6 METS) - no ischemic EKG changes. Normal MPI (+ breast attenuation). LVEF 70%. MEDICATIONS:     Current Outpatient Medications   Medication Sig Dispense Refill    NIFEdipine ER (ADALAT CC) 90 mg ER tablet Take 90 mg by mouth daily.  prednisoLONE acetate (PRED FORTE) 1 % ophthalmic suspension Administer 1 Drop to both eyes four (4) times daily.       carvediloL (COREG) 3.125 mg tablet TAKE 1 TABLET BY MOUTH TWICE DAILY WITH MEALS (Patient taking differently: ON HOLD AS OF 3/9/22 D/T Na+) 180 Tablet 0    rosuvastatin (CRESTOR) 10 mg tablet Take 1 tablet by mouth nightly 90 Tablet 3    triamterene-hydroCHLOROthiazide (MAXZIDE) 37.5-25 mg per tablet Take 1 tablet by mouth once daily (Patient taking differently: ON HOLD AS OF 3/9/22 D/T Na+) 90 Tablet 3    lisinopriL (PRINIVIL, ZESTRIL) 5 mg tablet Take 1 Tablet by mouth nightly. (Patient taking differently: Take 5 mg by mouth nightly. Doubling to 10 mg 3/5/22 for 2 weeks) 90 Tablet 3    brimonidine-timoloL (Combigan) 0.2-0.5 % drop ophthalmic solution 1 Drop every twelve (12) hours.  diclofenac (VOLTAREN) 1 % gel APPLY 2 GM TOPICALLY 4 TIMES DAILY TO THE AFFECTED AREA  3    triamcinolone acetonide (KENALOG) 0.1 % topical cream APPLY CREAM EXTERNALLY TO AFFECTED AREA TWICE DAILY AS NEEDED  1    melatonin 5 mg cap capsule Take 5 mg by mouth nightly.  metFORMIN (GLUCOPHAGE) 500 mg tablet Take  by mouth two (2) times daily (with meals).  latanoprost (XALATAN) 0.005 % ophthalmic solution Administer 1 Drop to both eyes nightly.  cyclobenzaprine (FLEXERIL) 10 mg tablet Take  by mouth as needed.  multivitamins-minerals-lutein (CENTRUM SILVER) tab tablet Take 1 Tab by mouth daily.  loperamide (IMODIUM) 1 mg/5 mL solution Take  by mouth as needed for Diarrhea.  Cholecalciferol, Vitamin D3, (VITAMIN D3) 1,000 unit cap Take  by mouth daily.  fexofenadine (ALLEGRA) 180 mg tablet Take  by mouth daily as needed.  aspirin 81 mg chewable tablet Take 1 Tab by mouth daily. 30 Tab 11       Allergies   Allergen Reactions    Aller Xt-Tree [place] Itching     OAK TREE, RED EYES, ITCHING    Animal Dander Other (comments)     Cats.  Stuffy nose    Darvon [Propoxyphene] Nausea Only    Hay Fever And Allergy Relief Cough and Sneezing    Mold Other (comments)     Stuff nose           SOCIAL HISTORY:     Social History     Tobacco Use    Smoking status: Never Smoker    Smokeless tobacco: Never Used   Substance Use Topics    Alcohol use: Yes     Comment: 1 glass of wine daily     Drug use: No         FAMILY HISTORY:     Family History   Problem Relation Age of Onset    Stroke Mother     COPD Father              REVIEW OF SYSTEMS:        Review of Systems:    Constitutional: Negative for fever, chills    HEENT: Glaucoma is worse    Respiratory: Negative for cough    Cardiovascular: Negative for orthopnea, syncope, and PND.    Gastrointestinal: Negative for abdominal pain or melena    Genitourinary: Negative for dysuria    Musculoskeletal: Negative for myalgias.  + joint pain   Skin: Negative for rash    Heme: No problems bleeding.    Neurological: Negative for speech change and focal weakness.                   PHYSICAL EXAM:       Physical Exam:  Visit Vitals  BP (!) 178/70 (BP 1 Location: Right upper arm, BP Patient Position: Sitting, BP Cuff Size: Adult)   Pulse 72   Ht 4' 8\" (1.422 m)   Wt 134 lb (60.8 kg)   SpO2 99%   BMI 30.04 kg/m²          Patient appears generally well, mood and affect are appropriate and pleasant. HEENT: Hearing intact, non-icteric, normocephalic, atraumatic. Neck Exam: Supple, No JVD    Lung Exam: Clear to auscultation, even breath sounds. Cardiac Exam: Regular rate and rhythm with no murmur  Abdomen: Soft, non-tender   Extremities: Moves all ext well.  Trace LE edema  Good ROM  Psych: Appropriate affect  Neuro - Grossly intact              LABS / OTHER STUDIES:           Labs 8/5/20 - CMP OK, chol 141, HDL 67, LDL 59, TG 73   Labs 8/21 - CMP OK, chol 148, LDL 64, TSH 1.8  Labs 2/22 - BMP OK except Na 129, CBC OK, A1c 6.6, chol 137, HDL 54, LDL 55,             LABS / OTHER STUDIES reviewed:       EKG 4/16/14 - NSR, normal    EKG 6/29/15 - NSR, normal   EKG 12/21/16 - NSR, normal   EKG 11/27/17 - NSR, normal   EKG 11/26/18 - NSR, PRWP  EKG 12/30/19 - NSR, RBBB  EKG 3/8/21 - NSR, RBBB  EKG 8/23/21 - NSR, RBBB          Carotid Dopplers 2/1/17 - mild plaque - 0-49% stenosis bilat   Exercise Cardiolite 4/4/17 - walked 3:16 (4.6 METS). No CP or ischemic EKG changes. Normal MPI. LVEF 69%  Echo 4/4/17 - mild LVH.  LVEF 55-60%      Exercise Cardiolite 9/16/19 - walked 4:32 (5.5 METS), No CP and normal stress EKG.  Normal MPI.  LVEF 72%.   /88 at baseline.       Echo 1/16/20 - LVEF 62%    LE PVR's 3/22/21 - normal rest and exercise CARLEE's     Exercise Cardiolite 4/2/21 - walked 4:20 (5.6 METS), normal stress EKG. Normal MPI, LVEF 80%             ASSESSMENT AND PLAN:        Assessment and Plan:    1) CAD    - CT Heart Scan 4/17/14 shows CAC Score 534, (80th%)     - CAC score 2019 was ~ 1200   - Exercise Cardiolite 4/2/21 - walked 4:20 (5.6 METS), normal stress EKG. Normal MPI, LVEF 80%  - For prevention of CV events, continue ASA 81 and statin . - work on a healthy diet and exercise    - She denies anginal chest pain, but is tired past couple of years    - Has stable class 2 HIGHTOWER  - can consider a cath for worsening complaints - check an echo next visit      2) Fatigue  - I don't think her fatigue is cardiac in nature   - Despite making medicine changes (stopping atenolol and switching statin) her symptoms persist  - Exercise Cardiolite 9/16/19 - walked 4:32 (5.5 METS), No CP and normal stress EKG.  Normal MPI.  LVEF 72%.   /88 at baseline.    - Exercise Cardiolite 4/2/21 - walked 4:20 (5.6 METS), normal stress EKG. Normal MPI, LVEF 80%  - I asked her to continue to exercise       2) SHEREE on CPAP      3) Dyslipidemia -    - cont statin - prior labs OK   - labs followed by PCP       4) HTN  - Since Na was low (129 on 2/22), she had thiazide diuretic held  - On 3/9/22 - BP is high in the office but she feels it is OK at home ---> follow BP at home BID and then come back and see Dr. Wu Barksdale for BP check soon --- goal NP < 130/80      5) Diabetes  - management per PCP     6) HTN FU with Dr. Wu Barksdale. See me in 6 months.     Was exercises at [facility] 3 days a week until COVID.  Has 3 cats.  in hospital (brain bleed, PE, sepsis).          Ciro Ellis MD, [address]  [address], [address]      [address] [facility].  [address]  [place], [address], [address] [place] [address]  Ph: [phone]                                [phone]

## 2022-03-09 NOTE — PROGRESS NOTES
Chief Complaint   Patient presents with    Follow-up     6 months    Hypertension    Coronary Artery Disease     Vitals:    03/09/22 1336 03/09/22 1345   BP: (!) 188/98 (!) 178/70   BP 1 Location: Left upper arm Right upper arm   BP Patient Position: Sitting Sitting   BP Cuff Size: Adult Adult   Pulse: 72    Height: 4' 8\" (1.422 m)    Weight: 134 lb (60.8 kg)    SpO2: 99%        Chest pain denied   SOB going up/down steps HIGHTOWER, but has been stable. Not getting enough exercise. Palpitations denied   Swelling in hands/feet denied   Dizziness denied   Recent hospital stays denied   Refills denied      was in hospital for brain bleed, PE, septic, now at rehab. Orders for See me in 6 months with an echo    per Dr. Luis Miguel ORTA. Dx: cad, hightower, fatigue    Bruising much more easily.   Only on ASA 81 mg

## 2022-03-19 PROBLEM — I45.10 RBBB: Status: ACTIVE | Noted: 2019-12-30

## 2022-04-15 DIAGNOSIS — R73.03 BORDERLINE DIABETES MELLITUS: ICD-10-CM

## 2022-04-15 DIAGNOSIS — I10 ESSENTIAL HYPERTENSION: ICD-10-CM

## 2022-04-15 DIAGNOSIS — E78.5 HYPERLIPIDEMIA, UNSPECIFIED HYPERLIPIDEMIA TYPE: ICD-10-CM

## 2022-04-15 DIAGNOSIS — I25.10 CORONARY ARTERY DISEASE INVOLVING NATIVE CORONARY ARTERY OF NATIVE HEART WITHOUT ANGINA PECTORIS: ICD-10-CM

## 2022-04-15 DIAGNOSIS — E11.9 CONTROLLED TYPE 2 DIABETES MELLITUS WITHOUT COMPLICATION, WITHOUT LONG-TERM CURRENT USE OF INSULIN (HCC): ICD-10-CM

## 2022-04-15 DIAGNOSIS — R53.82 CHRONIC FATIGUE: ICD-10-CM

## 2022-04-19 NOTE — TELEPHONE ENCOUNTER
Refill per VO of Dr. Becca Rollins  Last appt: 3/9/22  Future Appointments   Date Time Provider Gene Monique   9/13/2022 10:00 AM CHRIS WILLIAMSON   9/13/2022 10:40 AM MD KYLIE ZabalaF BS AMB       Requested Prescriptions     Refused Prescriptions Disp Refills    carvediloL (COREG) 3.125 mg tablet [Pharmacy Med Name: Carvedilol 3.125 MG Oral Tablet] 180 Tablet 0     Sig: TAKE 1 TABLET BY MOUTH TWICE DAILY WITH MEALS     Refused By: Karthik See     Reason for Refusal: Medication Discontinued

## 2022-04-25 RX ORDER — CARVEDILOL 3.12 MG/1
TABLET ORAL
Qty: 180 TABLET | Refills: 0 | OUTPATIENT
Start: 2022-04-25

## 2022-04-26 DIAGNOSIS — I25.10 CORONARY ARTERY DISEASE INVOLVING NATIVE CORONARY ARTERY OF NATIVE HEART WITHOUT ANGINA PECTORIS: ICD-10-CM

## 2022-04-26 DIAGNOSIS — E78.5 HYPERLIPIDEMIA, UNSPECIFIED HYPERLIPIDEMIA TYPE: ICD-10-CM

## 2022-04-26 DIAGNOSIS — R53.82 CHRONIC FATIGUE: ICD-10-CM

## 2022-04-26 DIAGNOSIS — R73.03 BORDERLINE DIABETES MELLITUS: ICD-10-CM

## 2022-04-26 DIAGNOSIS — E11.9 CONTROLLED TYPE 2 DIABETES MELLITUS WITHOUT COMPLICATION, WITHOUT LONG-TERM CURRENT USE OF INSULIN (HCC): ICD-10-CM

## 2022-04-26 DIAGNOSIS — I10 ESSENTIAL HYPERTENSION: ICD-10-CM

## 2022-04-27 RX ORDER — CARVEDILOL 3.12 MG/1
3.12 TABLET ORAL 2 TIMES DAILY WITH MEALS
Qty: 180 TABLET | Refills: 2 | Status: SHIPPED | OUTPATIENT
Start: 2022-04-27

## 2022-04-27 NOTE — TELEPHONE ENCOUNTER
Refill per VO of Dr. Elgin Morales  Last appt: 3/9/22  Future Appointments   Date Time Provider Gene Monique   9/13/2022 10:00 AM CHRIS WILLIAMSON   9/13/2022 10:40 AM MD IGNACIO Puri BS AMB       Requested Prescriptions     Signed Prescriptions Disp Refills    carvediloL (COREG) 3.125 mg tablet 180 Tablet 2     Sig: Take 1 Tablet by mouth two (2) times daily (with meals). Authorizing Provider: Vikki Brown     Ordering User: Shanique Part     \"ON HOLD AS OF 3/9/22 D/T Na+\"  Labs 2/22 - BMP OK except Na 129    Confirmed w Dr. Cali Fajardo that Carvedilol would not bother Na.

## 2022-05-04 ENCOUNTER — TRANSCRIBE ORDER (OUTPATIENT)
Dept: SCHEDULING | Age: 82
End: 2022-05-04

## 2022-05-04 DIAGNOSIS — I16.0 MALIGNANT HYPERTENSIVE URGENCY: Primary | ICD-10-CM

## 2022-05-16 ENCOUNTER — HOSPITAL ENCOUNTER (OUTPATIENT)
Dept: VASCULAR SURGERY | Age: 82
Discharge: HOME | End: 2022-05-16
Payer: MEDICARE

## 2022-05-16 DIAGNOSIS — I16.0 MALIGNANT HYPERTENSIVE URGENCY: ICD-10-CM

## 2022-05-16 PROCEDURE — 93975 VASCULAR STUDY: CPT

## 2022-05-17 LAB
ABDOMINAL PROX AORTA VEL: 103.9 CM/S
CELIAC PSV: 155.4 CM/S
LEFT KIDNEY LENGTH: 9.91 CM
LEFT KIDNEY WIDTH: 5.4 CM
LEFT RENAL DIST DIAS: 6.7 CM/S
LEFT RENAL DIST RAR: 0.34
LEFT RENAL DIST RI: 0.81
LEFT RENAL DIST SYS: 35.8 CM/S
LEFT RENAL LOWER PARENCHYMA MAX: 19.6 CM/S
LEFT RENAL LOWER PARENCHYMA MIN: 5.8 CM/S
LEFT RENAL LOWER PARENCHYMA RI: 0.7
LEFT RENAL MID DIAS: 5.6 CM/S
LEFT RENAL MID RAR: 0.29
LEFT RENAL MID RI: 0.81
LEFT RENAL MID SYS: 29.8 CM/S
LEFT RENAL MIDDLE PARENCHYMA MAX: 23.2 CM/S
LEFT RENAL MIDDLE PARENCHYMA MIN: 5.8 CM/S
LEFT RENAL MIDDLE PARENCHYMA RI: 0.75
LEFT RENAL PROX DIAS: 11.9 CM/S
LEFT RENAL PROX RAR: 0.8
LEFT RENAL PROX RI: 0.86
LEFT RENAL PROX SYS: 83.2 CM/S
LEFT RENAL UPPER PARENCHYMA MAX: 34.6 CM/S
LEFT RENAL UPPER PARENCHYMA MIN: 7.2 CM/S
LEFT RENAL UPPER PARENCHYMA RI: 0.79
PROX AORTA LONG DIAM: 1.22 CM
PROX AORTIC AP: 1.2 CM
PROX AORTIC TRANS: 1.2 CM
PROX SMA PSV: 210.2 CM/S
RIGHT KIDNEY LENGTH: 10.18 CM
RIGHT KIDNEY WIDTH: 4.61 CM
RIGHT RENAL DIST DIAS: 6.6 CM/S
RIGHT RENAL DIST RAR: 0.37
RIGHT RENAL DIST RI: 0.83
RIGHT RENAL DIST SYS: 38.5 CM/S
RIGHT RENAL LOWER PARENCHYMA MAX: 25.6 CM/S
RIGHT RENAL LOWER PARENCHYMA MIN: 8.1 CM/S
RIGHT RENAL LOWER PARENCHYMA RI: 0.68
RIGHT RENAL MID DIAS: 11.8 CM/S
RIGHT RENAL MID RAR: 0.62
RIGHT RENAL MID RI: 0.82
RIGHT RENAL MID SYS: 64.7 CM/S
RIGHT RENAL MIDDLE PARENCHYMA MAX: 40 CM/S
RIGHT RENAL MIDDLE PARENCHYMA MIN: 11.2 CM/S
RIGHT RENAL MIDDLE PARENCHYMA RI: 0.72
RIGHT RENAL PROX DIAS: 16.4 CM/S
RIGHT RENAL PROX RAR: 0.72
RIGHT RENAL PROX RI: 0.78
RIGHT RENAL PROX SYS: 74.5 CM/S
RIGHT RENAL UPPER PARENCHYMA MAX: 19.8 CM/S
RIGHT RENAL UPPER PARENCHYMA MIN: 4.4 CM/S
RIGHT RENAL UPPER PARENCHYMA RI: 0.78

## 2022-09-13 ENCOUNTER — OFFICE VISIT (OUTPATIENT)
Dept: CARDIOLOGY CLINIC | Age: 82
End: 2022-09-13
Payer: MEDICARE

## 2022-09-13 ENCOUNTER — ANCILLARY PROCEDURE (OUTPATIENT)
Dept: CARDIOLOGY CLINIC | Age: 82
End: 2022-09-13
Payer: MEDICARE

## 2022-09-13 VITALS
SYSTOLIC BLOOD PRESSURE: 160 MMHG | DIASTOLIC BLOOD PRESSURE: 80 MMHG | WEIGHT: 125.8 LBS | BODY MASS INDEX: 28.3 KG/M2 | HEIGHT: 56 IN

## 2022-09-13 VITALS
BODY MASS INDEX: 28.12 KG/M2 | OXYGEN SATURATION: 99 % | HEART RATE: 80 BPM | HEIGHT: 56 IN | DIASTOLIC BLOOD PRESSURE: 90 MMHG | SYSTOLIC BLOOD PRESSURE: 150 MMHG | WEIGHT: 125 LBS

## 2022-09-13 DIAGNOSIS — R06.02 SOB (SHORTNESS OF BREATH): ICD-10-CM

## 2022-09-13 DIAGNOSIS — R53.82 CHRONIC FATIGUE: ICD-10-CM

## 2022-09-13 DIAGNOSIS — I10 ESSENTIAL HYPERTENSION: Primary | ICD-10-CM

## 2022-09-13 DIAGNOSIS — I25.118 CORONARY ARTERY DISEASE OF NATIVE ARTERY OF NATIVE HEART WITH STABLE ANGINA PECTORIS (HCC): ICD-10-CM

## 2022-09-13 DIAGNOSIS — I25.10 CORONARY ARTERY DISEASE INVOLVING NATIVE CORONARY ARTERY OF NATIVE HEART WITHOUT ANGINA PECTORIS: ICD-10-CM

## 2022-09-13 LAB
ECHO AO ASC DIAM: 3.3 CM
ECHO AO ASCENDING AORTA INDEX: 2.28 CM/M2
ECHO AO ROOT DIAM: 2.7 CM
ECHO AO ROOT INDEX: 1.86 CM/M2
ECHO AV AREA PEAK VELOCITY: 2.3 CM2
ECHO AV AREA VTI: 2.2 CM2
ECHO AV AREA/BSA PEAK VELOCITY: 1.6 CM2/M2
ECHO AV AREA/BSA VTI: 1.5 CM2/M2
ECHO AV MEAN GRADIENT: 4 MMHG
ECHO AV MEAN VELOCITY: 0.9 M/S
ECHO AV PEAK GRADIENT: 7 MMHG
ECHO AV PEAK VELOCITY: 1.3 M/S
ECHO AV VELOCITY RATIO: 0.85
ECHO AV VTI: 30.3 CM
ECHO EST RA PRESSURE: 3 MMHG
ECHO LA DIAMETER INDEX: 2.62 CM/M2
ECHO LA DIAMETER: 3.8 CM
ECHO LA TO AORTIC ROOT RATIO: 1.41
ECHO LA VOL 2C: 47 ML (ref 22–52)
ECHO LA VOL 4C: 49 ML (ref 22–52)
ECHO LA VOLUME AREA LENGTH: 50 ML
ECHO LA VOLUME INDEX A2C: 32 ML/M2 (ref 16–34)
ECHO LA VOLUME INDEX A4C: 34 ML/M2 (ref 16–34)
ECHO LA VOLUME INDEX AREA LENGTH: 34 ML/M2 (ref 16–34)
ECHO LV E' LATERAL VELOCITY: 8 CM/S
ECHO LV E' SEPTAL VELOCITY: 7 CM/S
ECHO LV EDV A2C: 79 ML
ECHO LV EDV A4C: 73 ML
ECHO LV EDV BP: 78 ML (ref 56–104)
ECHO LV EDV INDEX A4C: 50 ML/M2
ECHO LV EDV INDEX BP: 54 ML/M2
ECHO LV EDV NDEX A2C: 54 ML/M2
ECHO LV EJECTION FRACTION A2C: 66 %
ECHO LV EJECTION FRACTION A4C: 62 %
ECHO LV EJECTION FRACTION BIPLANE: 64 % (ref 55–100)
ECHO LV ESV A2C: 26 ML
ECHO LV ESV A4C: 28 ML
ECHO LV ESV BP: 28 ML (ref 19–49)
ECHO LV ESV INDEX A2C: 18 ML/M2
ECHO LV ESV INDEX A4C: 19 ML/M2
ECHO LV ESV INDEX BP: 19 ML/M2
ECHO LV FRACTIONAL SHORTENING: 33 % (ref 28–44)
ECHO LV INTERNAL DIMENSION DIASTOLE INDEX: 2.97 CM/M2
ECHO LV INTERNAL DIMENSION DIASTOLIC: 4.3 CM (ref 3.9–5.3)
ECHO LV INTERNAL DIMENSION SYSTOLIC INDEX: 2 CM/M2
ECHO LV INTERNAL DIMENSION SYSTOLIC: 2.9 CM
ECHO LV IVSD: 0.7 CM (ref 0.6–0.9)
ECHO LV MASS 2D: 96.8 G (ref 67–162)
ECHO LV MASS INDEX 2D: 66.7 G/M2 (ref 43–95)
ECHO LV POSTERIOR WALL DIASTOLIC: 0.8 CM (ref 0.6–0.9)
ECHO LV RELATIVE WALL THICKNESS RATIO: 0.37
ECHO LVOT AREA: 2.8 CM2
ECHO LVOT AV VTI INDEX: 0.77
ECHO LVOT DIAM: 1.9 CM
ECHO LVOT MEAN GRADIENT: 2 MMHG
ECHO LVOT PEAK GRADIENT: 5 MMHG
ECHO LVOT PEAK VELOCITY: 1.1 M/S
ECHO LVOT STROKE VOLUME INDEX: 45.3 ML/M2
ECHO LVOT SV: 65.7 ML
ECHO LVOT VTI: 23.2 CM
ECHO MV A VELOCITY: 0.94 M/S
ECHO MV AREA PHT: 4.1 CM2
ECHO MV AREA VTI: 3 CM2
ECHO MV E DECELERATION TIME (DT): 184.8 MS
ECHO MV E VELOCITY: 0.72 M/S
ECHO MV E/A RATIO: 0.77
ECHO MV E/E' LATERAL: 9
ECHO MV E/E' RATIO (AVERAGED): 9.64
ECHO MV E/E' SEPTAL: 10.29
ECHO MV LVOT VTI INDEX: 0.94
ECHO MV MAX VELOCITY: 1 M/S
ECHO MV MEAN GRADIENT: 1 MMHG
ECHO MV MEAN VELOCITY: 0.5 M/S
ECHO MV PEAK GRADIENT: 4 MMHG
ECHO MV PRESSURE HALF TIME (PHT): 53.6 MS
ECHO MV VTI: 21.8 CM
ECHO RIGHT VENTRICULAR SYSTOLIC PRESSURE (RVSP): 31 MMHG
ECHO RV FREE WALL PEAK S': 10 CM/S
ECHO RV INTERNAL DIMENSION: 3.8 CM
ECHO RV TAPSE: 2.1 CM (ref 1.7–?)
ECHO TV REGURGITANT MAX VELOCITY: 2.65 M/S
ECHO TV REGURGITANT PEAK GRADIENT: 28 MMHG

## 2022-09-13 PROCEDURE — 99214 OFFICE O/P EST MOD 30 MIN: CPT | Performed by: SPECIALIST

## 2022-09-13 PROCEDURE — 93306 TTE W/DOPPLER COMPLETE: CPT | Performed by: SPECIALIST

## 2022-09-13 PROCEDURE — 93010 ELECTROCARDIOGRAM REPORT: CPT | Performed by: SPECIALIST

## 2022-09-13 PROCEDURE — G8400 PT W/DXA NO RESULTS DOC: HCPCS | Performed by: SPECIALIST

## 2022-09-13 PROCEDURE — 1090F PRES/ABSN URINE INCON ASSESS: CPT | Performed by: SPECIALIST

## 2022-09-13 PROCEDURE — G8753 SYS BP > OR = 140: HCPCS | Performed by: SPECIALIST

## 2022-09-13 PROCEDURE — 93005 ELECTROCARDIOGRAM TRACING: CPT | Performed by: SPECIALIST

## 2022-09-13 PROCEDURE — 1123F ACP DISCUSS/DSCN MKR DOCD: CPT | Performed by: SPECIALIST

## 2022-09-13 PROCEDURE — G8754 DIAS BP LESS 90: HCPCS | Performed by: SPECIALIST

## 2022-09-13 PROCEDURE — G8417 CALC BMI ABV UP PARAM F/U: HCPCS | Performed by: SPECIALIST

## 2022-09-13 PROCEDURE — G0463 HOSPITAL OUTPT CLINIC VISIT: HCPCS | Performed by: SPECIALIST

## 2022-09-13 PROCEDURE — G8432 DEP SCR NOT DOC, RNG: HCPCS | Performed by: SPECIALIST

## 2022-09-13 PROCEDURE — G8536 NO DOC ELDER MAL SCRN: HCPCS | Performed by: SPECIALIST

## 2022-09-13 PROCEDURE — 1101F PT FALLS ASSESS-DOCD LE1/YR: CPT | Performed by: SPECIALIST

## 2022-09-13 PROCEDURE — G8427 DOCREV CUR MEDS BY ELIG CLIN: HCPCS | Performed by: SPECIALIST

## 2022-09-13 RX ORDER — SPIRONOLACTONE 25 MG/1
12.5 TABLET ORAL DAILY
Qty: 90 TABLET | Refills: 3 | Status: SHIPPED | OUTPATIENT
Start: 2022-09-13 | End: 2022-09-30 | Stop reason: SDUPTHER

## 2022-09-13 RX ORDER — HYDROCHLOROTHIAZIDE 12.5 MG/1
CAPSULE ORAL
COMMUNITY
Start: 2022-07-18 | End: 2022-10-27 | Stop reason: SINTOL

## 2022-09-13 RX ORDER — AMLODIPINE BESYLATE 5 MG/1
5 TABLET ORAL DAILY
Qty: 90 TABLET | Refills: 3 | Status: SHIPPED | OUTPATIENT
Start: 2022-09-13

## 2022-09-13 RX ORDER — SPIRONOLACTONE 25 MG/1
25 TABLET ORAL DAILY
Qty: 90 TABLET | Refills: 3 | Status: SHIPPED | OUTPATIENT
Start: 2022-09-13 | End: 2022-09-13 | Stop reason: SDUPTHER

## 2022-09-13 RX ORDER — AMLODIPINE BESYLATE 10 MG/1
TABLET ORAL
COMMUNITY
Start: 2022-08-09 | End: 2022-09-13 | Stop reason: SDUPTHER

## 2022-09-13 NOTE — PROGRESS NOTES
Chief Complaint   Patient presents with    Follow-up     6 mo, ECHO with Michellemaría elenaguero Michaud     Hypertension    Coronary Artery Disease    Other     RBBB     Visit Vitals  BP (!) 150/90 (BP 1 Location: Left upper arm, BP Patient Position: Sitting)   Pulse 80   Ht 4' 8\" (1.422 m)   Wt 125 lb (56.7 kg)   SpO2 99%   BMI 28.02 kg/m²     Vitals:    09/13/22 1428 09/13/22 1455   BP: (!) 160/80 (!) 150/90   BP 1 Location: Left upper arm Left upper arm   BP Patient Position:  Sitting   Pulse:  80   Height: 4' 8\" (1.422 m)    Weight: 125 lb (56.7 kg)    SpO2:  99%       Chest pain denied   SOB - HIGHTOWER  Palpitations denied   Swelling in hands/feet denied   Dizziness denied   Recent hospital stays denied   Refills denied

## 2022-09-13 NOTE — PROGRESS NOTES
Andressa Fischer MD. [facility]              Patient: Reji James  : 1940      Today's Date: 2022        HISTORY OF PRESENT ILLNESS:     History of Present Illness:    Here for follow-up. Has a lot of stress in her life,  recently moved into nursing home because of dementia. Still has swelling in her ankles, she thinks it has worsened. Has HIGHTOWER with stairs but not other activity or at baseline. Still not exercising much, trying to walk some. Denies chest pain  Checks BP at home, has been fluctuating between 130-160/70-80s      PAST MEDICAL HISTORY:     Past Medical History:   Diagnosis Date    Arthritis     CAD (coronary artery disease)     CT Heart Scan 14 - CAC Score 534, (80th%)    Diabetes mellitus ([address]     Diverticulosis     Dyslipidemia     GERD (gastroesophageal reflux disease)     HTN (hypertension)     Obesity     S/P hip replacement     S/P knee replacement     Sleep apnea     on CPAP       Past Surgical History:   Procedure Laterality Date    HX KNEE REPLACEMENT      left,     HX OTHER SURGICAL      CT Heart Scan 14 - CAC Score 534, (80th%). Prominent lymph nodes. HX OTHER SURGICAL      Exercise cardiolite (14) - walked 3:15 (4.6 METS) - no ischemic EKG changes. Normal MPI (+ breast attenuation). LVEF 70%. CURRENT MEDICATIONS:    .  Current Outpatient Medications   Medication Sig Dispense Refill    amLODIPine (NORVASC) 10 mg tablet TAKE 1 TABLET BY MOUTH ONCE DAILY FOR 90 DAYS      hydroCHLOROthiazide (MICROZIDE) 12.5 mg capsule TAKE 1 CAPSULE BY MOUTH ONCE DAILY IN THE MORNING FOR 90 DAYS      carvediloL (COREG) 3.125 mg tablet Take 1 Tablet by mouth two (2) times daily (with meals). 180 Tablet 2    rosuvastatin (CRESTOR) 10 mg tablet Take 1 tablet by mouth nightly 90 Tablet 3    brimonidine-timoloL (Combigan) 0.2-0.5 % drop ophthalmic solution 1 Drop every twelve (12) hours.       diclofenac (VOLTAREN) 1 % gel APPLY 2 GM TOPICALLY 4 TIMES DAILY TO THE AFFECTED AREA  3    triamcinolone acetonide (KENALOG) 0.1 % topical cream APPLY CREAM EXTERNALLY TO AFFECTED AREA TWICE DAILY AS NEEDED  1    melatonin 5 mg cap capsule Take 5 mg by mouth nightly. metFORMIN (GLUCOPHAGE) 500 mg tablet Take  by mouth two (2) times daily (with meals). latanoprost (XALATAN) 0.005 % ophthalmic solution Administer 1 Drop to both eyes nightly. cyclobenzaprine (FLEXERIL) 10 mg tablet Take  by mouth as needed. multivitamins-minerals-lutein (MEN'S CENTRUM SILVER WITH LUTEIN) tab tablet Take 1 Tab by mouth daily. fexofenadine (ALLEGRA) 180 mg tablet Take  by mouth daily as needed. aspirin 81 mg chewable tablet Take 1 Tab by mouth daily. 30 Tab 11       Allergies   Allergen Reactions    Aller Xt-Tree Pollen-[place] Itching     OAK TREE, RED EYES, ITCHING    Animal Dander Other (comments)     Cats. Stuffy nose    Darvon [Propoxyphene] Nausea Only    Hay Fever And Allergy Relief Cough and Sneezing    Mold Other (comments)     Stuff nose             SOCIAL HISTORY:     Social History     Tobacco Use    Smoking status: Never    Smokeless tobacco: Never   Substance Use Topics    Alcohol use: Yes     Comment: 1 glass of wine daily     Drug use: No           FAMILY HISTORY:     Family History   Problem Relation Age of Onset    Stroke Mother     COPD Father            REVIEW OF SYSTEMS:        Review of Systems:    Constitutional: Negative for fever, chills    HEENT: Glaucoma is worse    Respiratory: Negative for cough    Cardiovascular: Negative for orthopnea, syncope, and PND. Gastrointestinal: Negative for abdominal pain or melena    Genitourinary: Negative for dysuria    Musculoskeletal: Negative for myalgias. + joint pain   Skin: Negative for rash    Heme: No problems bleeding. Neurological: Negative for speech change and focal weakness.                    PHYSICAL EXAM:       Physical Exam:  Visit Vitals  BP (!) 150/90 (BP 1 Location: Left upper arm, BP Patient Position: Sitting)   Pulse 80   Ht 4' 8\" (1.422 m)   Wt 125 lb (56.7 kg)   SpO2 99%   BMI 28.02 kg/m²          Patient appears generally well, mood and affect are appropriate and pleasant. HEENT: Hearing intact, non-icteric, normocephalic, atraumatic. Neck Exam: Supple, No JVD    Lung Exam: Clear to auscultation, even breath sounds. Cardiac Exam: Regular rate and rhythm with no murmur  Abdomen: Soft, non-tender   Extremities: Moves all ext well. Trace LE edema  Good ROM  Psych: Appropriate affect  Neuro - Grossly intact              LABS / OTHER STUDIES:           Labs 8/5/20 - CMP OK, chol 141, HDL 67, LDL 59, TG 73   Labs 8/21 - CMP OK, chol 148, LDL 64, TSH 1.8  Labs 2/22 - BMP OK except Na 129, CBC OK, A1c 6.6, chol 137, HDL 54, LDL 55,                LABS / OTHER STUDIES reviewed:         EKG 4/16/14 - NSR, normal    EKG 6/29/15 - NSR, normal   EKG 12/21/16 - NSR, normal   EKG 11/27/17 - NSR, normal   EKG 11/26/18 - NSR, PRWP  EKG 12/30/19 - NSR, RBBB  EKG 3/8/21 - NSR, RBBB  EKG 8/23/21 - NSR, RBBB          Carotid Dopplers 2/1/17 - mild plaque - 0-49% stenosis bilat   Exercise Cardiolite 4/4/17 - walked 3:16 (4.6 METS). No CP or ischemic EKG changes. Normal MPI. LVEF 69%  Echo 4/4/17 - mild LVH. LVEF 55-60%      Exercise Cardiolite 9/16/19 - walked 4:32 (5.5 METS), No CP and normal stress EKG. Normal MPI. LVEF 72%. /88 at baseline. Echo 1/16/20 - LVEF 62%     LE PVR's 3/22/21 - normal rest and exercise CARLEE's      Exercise Cardiolite 4/2/21 - walked 4:20 (5.6 METS), normal stress EKG. Normal MPI, LVEF 80%    Echo 9/13/22 - PRELIM - LVEF 60%               ASSESSMENT AND PLAN:        Assessment and Plan:    1) CAD    - CT Heart Scan 4/17/14 shows CAC Score 534, (80th%)     - CAC score 2019 was ~ 1200   - Exercise Cardiolite 4/2/21 - walked 4:20 (5.6 METS), normal stress EKG. Normal MPI, LVEF 80%  - For prevention of CV events, continue ASA 81 and statin .    - work on a healthy diet and exercise    - She denies anginal chest pain, but is tired past couple of years    - Has stable class 2 HIGHTOWER  - can consider a cath for worsening complaints - check an echo next visit       2) Fatigue  - I don't think her fatigue is cardiac in nature   - Despite making medicine changes (stopping atenolol and switching statin) her symptoms persist  - Exercise Cardiolite 9/16/19 - walked 4:32 (5.5 METS), No CP and normal stress EKG. Normal MPI. LVEF 72%. /88 at baseline.    - Exercise Cardiolite 4/2/21 - walked 4:20 (5.6 METS), normal stress EKG. Normal MPI, LVEF 80%  - I asked her to continue to exercise       2) SHEREE on CPAP      3) Dyslipidemia -    - cont statin - prior labs OK   - labs followed by PCP       4) HTN  - Since Na was low (129 on 2/22), she had thiazide diuretic held  - On 9/22 - BP higher than goal - Will cut norvasc in half due to leg swelling and start aldactone 12.5 mg daily. Follow labs. 5) Diabetes  - management per PCP      6) See NP in 2 weeks   Was exercises at [facility] 3 days a week until COVID. Has 3 cats.  in hospital (brain bleed, PE, sepsis). Iain Miller MD, [address]  [address] [facility], [address]      [place] [address]  [address]  [address]  Ph: [phone]                               Ph [phone]

## 2022-09-22 LAB
BUN SERPL-MCNC: 20 MG/DL (ref 8–27)
BUN/CREAT SERPL: 24 (ref 12–28)
CALCIUM SERPL-MCNC: 9.7 MG/DL (ref 8.7–10.3)
CHLORIDE SERPL-SCNC: 98 MMOL/L (ref 96–106)
CO2 SERPL-SCNC: 27 MMOL/L (ref 20–29)
CREAT SERPL-MCNC: 0.84 MG/DL (ref 0.57–1)
EGFR: 70 ML/MIN/1.73
GLUCOSE SERPL-MCNC: 121 MG/DL (ref 65–99)
POTASSIUM SERPL-SCNC: 3.5 MMOL/L (ref 3.5–5.2)
SODIUM SERPL-SCNC: 141 MMOL/L (ref 134–144)

## 2022-09-23 NOTE — PROGRESS NOTES
Dear Ms. Leija,  Your test results are stable. Please let me know if you have any questions.    Rajiv Ruvalcaba,  Dr. Shimon Connell

## 2022-09-30 ENCOUNTER — OFFICE VISIT (OUTPATIENT)
Dept: CARDIOLOGY CLINIC | Age: 82
End: 2022-09-30
Payer: MEDICARE

## 2022-09-30 VITALS
SYSTOLIC BLOOD PRESSURE: 130 MMHG | HEIGHT: 56 IN | OXYGEN SATURATION: 99 % | DIASTOLIC BLOOD PRESSURE: 72 MMHG | BODY MASS INDEX: 28.79 KG/M2 | WEIGHT: 128 LBS | HEART RATE: 76 BPM

## 2022-09-30 DIAGNOSIS — I10 ESSENTIAL HYPERTENSION: Primary | ICD-10-CM

## 2022-09-30 PROCEDURE — 99214 OFFICE O/P EST MOD 30 MIN: CPT | Performed by: NURSE PRACTITIONER

## 2022-09-30 PROCEDURE — G0463 HOSPITAL OUTPT CLINIC VISIT: HCPCS | Performed by: NURSE PRACTITIONER

## 2022-09-30 PROCEDURE — G8752 SYS BP LESS 140: HCPCS | Performed by: NURSE PRACTITIONER

## 2022-09-30 PROCEDURE — G8427 DOCREV CUR MEDS BY ELIG CLIN: HCPCS | Performed by: NURSE PRACTITIONER

## 2022-09-30 PROCEDURE — G8417 CALC BMI ABV UP PARAM F/U: HCPCS | Performed by: NURSE PRACTITIONER

## 2022-09-30 PROCEDURE — 1090F PRES/ABSN URINE INCON ASSESS: CPT | Performed by: NURSE PRACTITIONER

## 2022-09-30 PROCEDURE — 1123F ACP DISCUSS/DSCN MKR DOCD: CPT | Performed by: NURSE PRACTITIONER

## 2022-09-30 PROCEDURE — 1101F PT FALLS ASSESS-DOCD LE1/YR: CPT | Performed by: NURSE PRACTITIONER

## 2022-09-30 PROCEDURE — G8536 NO DOC ELDER MAL SCRN: HCPCS | Performed by: NURSE PRACTITIONER

## 2022-09-30 PROCEDURE — G8432 DEP SCR NOT DOC, RNG: HCPCS | Performed by: NURSE PRACTITIONER

## 2022-09-30 PROCEDURE — G8754 DIAS BP LESS 90: HCPCS | Performed by: NURSE PRACTITIONER

## 2022-09-30 PROCEDURE — G8400 PT W/DXA NO RESULTS DOC: HCPCS | Performed by: NURSE PRACTITIONER

## 2022-09-30 RX ORDER — ACETAMINOPHEN 500 MG
500 TABLET ORAL
COMMUNITY

## 2022-09-30 RX ORDER — POLYETHYLENE GLYCOL 400 AND PROPYLENE GLYCOL 4; 3 MG/ML; MG/ML
1 SOLUTION/ DROPS OPHTHALMIC AS NEEDED
COMMUNITY

## 2022-09-30 RX ORDER — PREDNISOLONE ACETATE 10 MG/ML
SUSPENSION/ DROPS OPHTHALMIC
COMMUNITY
Start: 2022-09-21

## 2022-09-30 RX ORDER — SPIRONOLACTONE 25 MG/1
25 TABLET ORAL DAILY
Qty: 90 TABLET | Refills: 0 | Status: SHIPPED | OUTPATIENT
Start: 2022-09-30

## 2022-09-30 RX ORDER — LIDOCAINE 50 MG/G
PATCH TOPICAL
COMMUNITY
Start: 2022-09-28

## 2022-09-30 NOTE — PROGRESS NOTES
Esha Schneider, [facility]  [address]  [place], [address] [facility]    Office [phone]  Fax [phone]          Patient: Yosi Singh  : 1940      Today's Date: 2022        HISTORY OF PRESENT ILLNESS:     History of Present Illness:    Here for fu of BP - started on low dose mable last visit. Norvasc decreased d/t swelling. Hm BP 130s-160s/70s-80s, HR 60s-80s    Swelling has improved. Pt feels well and without c/o. Patient denies any exertional chest pain, breathing changes, palpitations, syncope, orthopnea, edema, or paroxysmal nocturnal dyspnea. PAST MEDICAL HISTORY:     Past Medical History:   Diagnosis Date    Arthritis     CAD (coronary artery disease)     CT Heart Scan 14 - CAC Score 534, (80th%)    Diabetes mellitus ([address]     Diverticulosis     Dyslipidemia     GERD (gastroesophageal reflux disease)     HTN (hypertension)     Obesity     S/P hip replacement     S/P knee replacement     Sleep apnea     on CPAP       Past Surgical History:   Procedure Laterality Date    HX KNEE REPLACEMENT      left,     HX OTHER SURGICAL      CT Heart Scan 14 - CAC Score 534, (80th%). Prominent lymph nodes. HX OTHER SURGICAL      Exercise cardiolite (14) - walked 3:15 (4.6 METS) - no ischemic EKG changes. Normal MPI (+ breast attenuation). LVEF 70%. CURRENT MEDICATIONS:    .  Current Outpatient Medications   Medication Sig Dispense Refill    lidocaine (LIDODERM) 5 % APPLY 1 PATCH TOPICALLY DAILY. REMOVE AND DISCARD PATCH WITHIN 12 HOURS OR AS DRIECTED BY MD.      prednisoLONE acetate (PRED FORTE) 1 % ophthalmic suspension INSTILL 1 DROP INTO EACH EYE 4 TIMES DAILY      folic acid/multivit-min/lutein (CENTRUM SILVER PO) Take  by mouth. bismuth subsalicylate (ANTI-DIARRHEAL PO) Take  by mouth.       acetaminophen (Tylenol Extra Strength) 500 mg tablet Take 500 mg by mouth every six (6) hours as needed for Pain.      trolamine salicylate (ASPERCREME EX) by Apply Externally route. peg 400-propylene glycol (Systane, propylene glycoL,) 0.4-0.3 % drop 1 Drop as needed. hydroCHLOROthiazide (MICROZIDE) 12.5 mg capsule TAKE 1 CAPSULE BY MOUTH ONCE DAILY IN THE MORNING FOR 90 DAYS      amLODIPine (NORVASC) 5 mg tablet Take 1 Tablet by mouth daily. 90 Tablet 3    spironolactone (ALDACTONE) 25 mg tablet Take 0.5 Tablets by mouth daily. 90 Tablet 3    carvediloL (COREG) 3.125 mg tablet Take 1 Tablet by mouth two (2) times daily (with meals). 180 Tablet 2    rosuvastatin (CRESTOR) 10 mg tablet Take 1 tablet by mouth nightly 90 Tablet 3    brimonidine-timoloL (Combigan) 0.2-0.5 % drop ophthalmic solution 1 Drop every twelve (12) hours. diclofenac (VOLTAREN) 1 % gel APPLY 2 GM TOPICALLY 4 TIMES DAILY TO THE AFFECTED AREA  3    triamcinolone acetonide (KENALOG) 0.1 % topical cream APPLY CREAM EXTERNALLY TO AFFECTED AREA TWICE DAILY AS NEEDED  1    metFORMIN (GLUCOPHAGE) 500 mg tablet Take  by mouth nightly. cyclobenzaprine (FLEXERIL) 10 mg tablet Take  by mouth as needed. fexofenadine (ALLEGRA) 180 mg tablet Take  by mouth daily as needed. aspirin 81 mg chewable tablet Take 1 Tab by mouth daily. 30 Tab 11    melatonin 5 mg cap capsule Take 5 mg by mouth nightly. (Patient not taking: Reported on 9/30/2022)      latanoprost (XALATAN) 0.005 % ophthalmic solution Administer 1 Drop to both eyes nightly. (Patient not taking: Reported on 9/30/2022)      multivitamins-minerals-lutein (MEN'S CENTRUM SILVER WITH LUTEIN) tab tablet Take 1 Tab by mouth daily. (Patient not taking: Reported on 9/30/2022)         Allergies   Allergen Reactions    Aller Xt-Tree Pollen-[place] Itching     OAK TREE, RED EYES, ITCHING    Adhesive Rash    Animal Dander Other (comments)     Cats.  Stuffy nose    Betadine [Povidone-Iodine] Rash    Darvon [Propoxyphene] Nausea Only    Hay Fever And Allergy Relief Cough and Sneezing    Mold Other (comments) Stuff nose             SOCIAL HISTORY:     Social History     Tobacco Use    Smoking status: Never    Smokeless tobacco: Never   Vaping Use    Vaping Use: Never used   Substance Use Topics    Alcohol use: Yes     Comment: 1 glass of wine daily     Drug use: No           FAMILY HISTORY:     Family History   Problem Relation Age of Onset    Stroke Mother     COPD Father            REVIEW OF SYSTEMS:        Review of Systems:    Constitutional: Negative for fever, chills    HEENT: Glaucoma is worse    Respiratory: Negative for cough    Cardiovascular: Negative for orthopnea, syncope, and PND. Gastrointestinal: Negative for abdominal pain or melena    Genitourinary: Negative for dysuria    Musculoskeletal: Negative for myalgias. + joint pain   Skin: Negative for rash    Heme: No problems bleeding. Neurological: Negative for speech change and focal weakness. PHYSICAL EXAM:       Physical Exam:  Visit Vitals  /64 (BP 1 Location: Left upper arm, BP Patient Position: Sitting, BP Cuff Size: Adult)   Pulse 76   Ht 4' 8\" (1.422 m)   Wt 128 lb (58.1 kg)   SpO2 99%   BMI 28.70 kg/m²     General - elderly, NAD  Neck - neck supple, no JVD  Cardiac - normal S1, S2, RRR, no murmurs, rubs or gallops.  No clicks  Vascular - carotids without bruits, radials pulses equal bilateral  Lungs - clear to auscultation bilaterals, no rales, wheezing or rhonchi  Abd - soft nontender, non-distended, +BS  Extremities - trace LE edema, warm  Neuro - nonfocal  Psych - normal mood and affect        LABS / OTHER STUDIES:        Labs 8/5/20 - CMP OK, chol 141, HDL 67, LDL 59, TG 73   Labs 8/21 - CMP OK, chol 148, LDL 64, TSH 1.8  Labs 2/22 - BMP OK except Na 129, CBC OK, A1c 6.6, chol 137, HDL 54, LDL 55,       Lab Results   Component Value Date/Time    Sodium 141 09/21/2022 02:56 PM    Potassium 3.5 09/21/2022 02:56 PM    Chloride 98 09/21/2022 02:56 PM    CO2 27 09/21/2022 02:56 PM    Anion gap 12 12/15/2015 09:20 AM Glucose 121 (H) 09/21/2022 02:56 PM    BUN 20 09/21/2022 02:56 PM    Creatinine 0.84 09/21/2022 02:56 PM    BUN/Creatinine ratio 24 09/21/2022 02:56 PM    GFR est AA 99 08/28/2019 07:57 AM    GFR est non-AA 86 08/28/2019 07:57 AM    Calcium 9.7 09/21/2022 02:56 PM    Bilirubin, total 0.5 04/19/2017 08:25 AM    Alk. phosphatase 59 04/19/2017 08:25 AM    Protein, total 6.6 04/19/2017 08:25 AM    Albumin 4.1 04/19/2017 08:25 AM    A-G Ratio 1.6 04/19/2017 08:25 AM    ALT (SGPT) 18 04/19/2017 08:25 AM    AST (SGOT) 15 04/19/2017 08:25 AM     No results found for: WBC, WBCLT, HGBPOC, HGB, HGBP, HCTPOC, HCT, PHCT, RBCH, PLT, MCV, HGBEXT, HCTEXT, PLTEXT  Lab Results   Component Value Date/Time    Cholesterol, total 122 12/15/2015 09:20 AM    Cholesterol, Total 107 04/19/2017 08:25 AM    HDL Cholesterol 50 12/15/2015 09:20 AM    LDL, calculated 57 12/15/2015 09:20 AM    Triglyceride 85 12/15/2015 09:20 AM     Lab Results   Component Value Date/Time    TSH 2.180 04/19/2017 08:25 AM     Lab Results   Component Value Date/Time    Hemoglobin A1c 6.9 (H) 04/19/2017 08:25 AM    Hemoglobin A1c, External 6.6 02/25/2022 12:00 AM     No results found for: CPK, RCK1, RCK2, RCK3, RCK4, CKMB, CKNDX, CKND1, TROPT, TROIQ, BNPP, BNP       LABS / OTHER STUDIES reviewed:         EKG 4/16/14 - NSR, normal    EKG 6/29/15 - NSR, normal   EKG 12/21/16 - NSR, normal   EKG 11/27/17 - NSR, normal   EKG 11/26/18 - NSR, PRWP  EKG 12/30/19 - NSR, RBBB  EKG 3/8/21 - NSR, RBBB  EKG 8/23/21 - NSR, RBBB          Carotid Dopplers 2/1/17 - mild plaque - 0-49% stenosis bilat   Exercise Cardiolite 4/4/17 - walked 3:16 (4.6 METS). No CP or ischemic EKG changes. Normal MPI. LVEF 69%  Echo 4/4/17 - mild LVH. LVEF 55-60%      Exercise Cardiolite 9/16/19 - walked 4:32 (5.5 METS), No CP and normal stress EKG. Normal MPI. LVEF 72%. /88 at baseline.        Echo 1/16/20 - LVEF 62%     LE PVR's 3/22/21 - normal rest and exercise CARLEE's      Exercise Cardiolite 4/2/21 - walked 4:20 (5.6 METS), normal stress EKG. Normal MPI, LVEF 80%    09/13/22    ECHO ADULT COMPLETE 09/13/2022 9/13/2022    Interpretation Summary    Left Ventricle: Normal left ventricular systolic function. EF by 2D Simpsons Biplane is 64%. Left ventricle size is normal. Normal wall thickness. Normal wall motion. Normal diastolic function for age. Left Atrium: Left atrium is mildly dilated. Right Atrium: Right atrium is mildly dilated. Mitral Valve: Mild regurgitation. Aorta: Ao Ascending diameter is 3.3 cm. Ao Ascending Index is 2.28 cm/m2. Signed by: Michael Urban MD on 9/13/2022  6:49 PM          ASSESSMENT AND PLAN:        Assessment and Plan:    1) CAD    - CT Heart Scan 4/17/14 shows CAC Score 534, (80th%)     - CAC score 2019 was ~ 1200   - Exercise Cardiolite 4/2/21 - walked 4:20 (5.6 METS), normal stress EKG. Normal MPI, LVEF 80%  - For prevention of CV events, continue ASA 81 and statin . - work on a healthy diet and exercise    - She denies anginal chest pain, but is tired past couple of years    - Has stable class 2 HIGHTOWER  - can consider a cath for worsening complaints - check an echo next visit       2) Fatigue  - I don't think her fatigue is cardiac in nature   - Despite making medicine changes (stopping atenolol and switching statin) her symptoms persist  - Exercise Cardiolite 9/16/19 - walked 4:32 (5.5 METS), No CP and normal stress EKG. Normal MPI. LVEF 72%. /88 at baseline.    - Exercise Cardiolite 4/2/21 - walked 4:20 (5.6 METS), normal stress EKG. Normal MPI, LVEF 80%  - I asked her to continue to exercise ; doing ok recently      2) SHEREE on CPAP - good compliance       3) Dyslipidemia -    - cont statin - prior labs OK   - labs followed by PCP       4) HTN  - Since Na was low (129 on 2/22), she had thiazide diuretic held  - On 9/22 - BP higher than goal - Will cut norvasc in half due to leg swelling and start aldactone 12.5 mg daily. Follow labs.     - on 9/30/22 - labs stable but BP continues to be elevated - further increase spironolactone to 25mg/d - repeat Bmp in 2-3 wks.      5) Diabetes  - management per PCP   Lab Results   Component Value Date/Time    Hemoglobin A1c 6.9 (H) 04/19/2017 08:25 AM    Hemoglobin A1c, External 6.6 02/25/2022 12:00 AM       Fu in 3mo or PRN  Maru Sprague, ANP

## 2022-09-30 NOTE — LETTER
9/30/2022    Patient: Jeffry Glover   YOB: 1940   Date of Visit: 9/30/2022     Aron Sears, [address] [facility] [address]  Via Fax: [phone]     Judy Rowe MD  [address] [facility] [address]  [address] [facility]  [address] [facility] [address]    Dear MD Judy Jaime MD,      Thank you for referring Ms. Karena Key to [facility] for evaluation. My notes for this consultation are attached. If you have questions, please do not hesitate to call me. I look forward to following your patient along with you.       Sincerely,    Aide Garcia NP

## 2022-09-30 NOTE — PROGRESS NOTES
Room: EP2    Visit Vitals  /64 (BP 1 Location: Left upper arm, BP Patient Position: Sitting, BP Cuff Size: Adult)   Pulse 76   Ht 4' 8\" (1.422 m)   Wt 128 lb (58.1 kg)   SpO2 99%   BMI 28.70 kg/m²       -fluctuating BP  -little lighth      HTN  - Since Na was low (129 on 2/22), she had thiazide diuretic held  - On 9/22 - BP higher than goal - Will cut norvasc in half due to leg swelling and start aldactone 12.5 mg daily. Follow labs.       Chest pain: no  Shortness of breath: no  Dizziness: no  Palpitations/Racing Heart: no  Swelling: no    New diagnosis/Surgeries since your last visit: no    Hospitalizations since your last visit: no    Refills: no

## 2022-10-27 ENCOUNTER — TELEPHONE (OUTPATIENT)
Dept: CARDIOLOGY CLINIC | Age: 82
End: 2022-10-27

## 2022-10-27 DIAGNOSIS — E87.1 HYPONATREMIA: Primary | ICD-10-CM

## 2022-10-27 LAB
BUN SERPL-MCNC: 13 MG/DL (ref 8–27)
BUN/CREAT SERPL: 20 (ref 12–28)
CALCIUM SERPL-MCNC: 10.1 MG/DL (ref 8.7–10.3)
CHLORIDE SERPL-SCNC: 89 MMOL/L (ref 96–106)
CO2 SERPL-SCNC: 23 MMOL/L (ref 20–29)
CREAT SERPL-MCNC: 0.66 MG/DL (ref 0.57–1)
EGFR: 88 ML/MIN/1.73
GLUCOSE SERPL-MCNC: 98 MG/DL (ref 70–99)
POTASSIUM SERPL-SCNC: 4.5 MMOL/L (ref 3.5–5.2)
SODIUM SERPL-SCNC: 128 MMOL/L (ref 134–144)

## 2022-10-27 NOTE — TELEPHONE ENCOUNTER
Amara - pls call patient with below med change. Hyponatremia noted on labs (low sodium). Please have pt discontinue HCTZ. Repeat BMP in another 2-3 weeks. Cont to watch hm BP - call us prior to follow-up if running routinely >140/90.       Lab Results   Component Value Date/Time    Sodium 128 (L) 10/26/2022 02:10 PM    Potassium 4.5 10/26/2022 02:10 PM    Chloride 89 (L) 10/26/2022 02:10 PM    CO2 23 10/26/2022 02:10 PM    Anion gap 12 12/15/2015 09:20 AM    Glucose 98 10/26/2022 02:10 PM    BUN 13 10/26/2022 02:10 PM    Creatinine 0.66 10/26/2022 02:10 PM    BUN/Creatinine ratio 20 10/26/2022 02:10 PM    GFR est AA 99 08/28/2019 07:57 AM    GFR est non-AA 86 08/28/2019 07:57 AM    Calcium 10.1 10/26/2022 02:10 PM

## 2022-10-27 NOTE — TELEPHONE ENCOUNTER
Placed a call to patient. ID verified using two patient identifiers. Patient has been advised of the previous communication by Jody Trotter NP. Opportunities for questions, clarifications, and concerns provided. Pt expressed understanding.

## 2022-10-28 ENCOUNTER — TELEPHONE (OUTPATIENT)
Dept: CARDIOLOGY CLINIC | Age: 82
End: 2022-10-28

## 2022-10-28 NOTE — TELEPHONE ENCOUNTER
Amara - pls call patient with below med change. Hyponatremia noted on labs (low sodium). Please have pt discontinue HCTZ. Repeat BMP in another 2-3 weeks. Cont to watch hm BP - call us prior to follow-up if running routinely >140/90. Placed a call to patient. ID verified using two patient identifiers. Patient has been advised of the above communication by Althea Frausto NP. Opportunities for questions, clarifications, and concerns provided. Pt expressed understanding.

## 2022-11-18 LAB
BUN SERPL-MCNC: 16 MG/DL (ref 8–27)
BUN/CREAT SERPL: 24 (ref 12–28)
CALCIUM SERPL-MCNC: 9.5 MG/DL (ref 8.7–10.3)
CHLORIDE SERPL-SCNC: 99 MMOL/L (ref 96–106)
CO2 SERPL-SCNC: 24 MMOL/L (ref 20–29)
CREAT SERPL-MCNC: 0.67 MG/DL (ref 0.57–1)
EGFR: 88 ML/MIN/1.73
GLUCOSE SERPL-MCNC: 107 MG/DL (ref 70–99)
POTASSIUM SERPL-SCNC: 4.6 MMOL/L (ref 3.5–5.2)
SODIUM SERPL-SCNC: 136 MMOL/L (ref 134–144)

## 2022-12-27 RX ORDER — ROSUVASTATIN CALCIUM 10 MG/1
TABLET, COATED ORAL
Qty: 90 TABLET | Refills: 0 | Status: SHIPPED | OUTPATIENT
Start: 2022-12-27

## 2022-12-27 NOTE — TELEPHONE ENCOUNTER
Refill per VO of Dr. Mark Wagner  Last appt: 3/9/2022  Future Appointments   Date Time Provider Gene Amaya   1/18/2023  2:00 PM Brooks Ramos MD [facility]       Requested Prescriptions     Pending Prescriptions Disp Refills    rosuvastatin (CRESTOR) 10 mg tablet [Pharmacy Med Name: Rosuvastatin Calcium 10 MG Oral Tablet] 90 Tablet 0     Sig: Take 1 tablet by mouth nightly

## 2022-12-30 RX ORDER — ROSUVASTATIN CALCIUM 10 MG/1
TABLET, COATED ORAL
Qty: 90 TABLET | Refills: 0 | OUTPATIENT
Start: 2022-12-30

## 2022-12-30 NOTE — TELEPHONE ENCOUNTER
Refill per VO of Dr. Marlyse Krabbe  Last appt: 3/9/2022  Future Appointments   Date Time Provider Gene Cotai   1/18/2023  2:00 PM Eladio Cowart MD [facility]       Requested Prescriptions     Refused Prescriptions Disp Refills    rosuvastatin (CRESTOR) 10 mg tablet [Pharmacy Med Name: Rosuvastatin Calcium 10 MG Oral Tablet] 90 Tablet 0     Sig: Take 1 tablet by mouth nightly     Refused By: Kait Rduolph     Reason for Refusal: Patient has requested refill too soon

## 2023-01-18 ENCOUNTER — OFFICE VISIT (OUTPATIENT)
Dept: CARDIOLOGY CLINIC | Age: 83
End: 2023-01-18
Payer: MEDICARE

## 2023-01-18 VITALS
DIASTOLIC BLOOD PRESSURE: 80 MMHG | HEART RATE: 78 BPM | OXYGEN SATURATION: 98 % | BODY MASS INDEX: 30.14 KG/M2 | SYSTOLIC BLOOD PRESSURE: 150 MMHG | HEIGHT: 56 IN | WEIGHT: 134 LBS

## 2023-01-18 DIAGNOSIS — E78.5 HYPERLIPIDEMIA, UNSPECIFIED HYPERLIPIDEMIA TYPE: ICD-10-CM

## 2023-01-18 DIAGNOSIS — R73.03 BORDERLINE DIABETES MELLITUS: ICD-10-CM

## 2023-01-18 DIAGNOSIS — E11.9 CONTROLLED TYPE 2 DIABETES MELLITUS WITHOUT COMPLICATION, WITHOUT LONG-TERM CURRENT USE OF INSULIN (HCC): ICD-10-CM

## 2023-01-18 DIAGNOSIS — I10 ESSENTIAL HYPERTENSION: ICD-10-CM

## 2023-01-18 DIAGNOSIS — R53.82 CHRONIC FATIGUE: ICD-10-CM

## 2023-01-18 DIAGNOSIS — I25.10 CORONARY ARTERY DISEASE INVOLVING NATIVE CORONARY ARTERY OF NATIVE HEART WITHOUT ANGINA PECTORIS: ICD-10-CM

## 2023-01-18 PROCEDURE — G8427 DOCREV CUR MEDS BY ELIG CLIN: HCPCS | Performed by: SPECIALIST

## 2023-01-18 PROCEDURE — 1101F PT FALLS ASSESS-DOCD LE1/YR: CPT | Performed by: SPECIALIST

## 2023-01-18 PROCEDURE — 3079F DIAST BP 80-89 MM HG: CPT | Performed by: SPECIALIST

## 2023-01-18 PROCEDURE — 3077F SYST BP >= 140 MM HG: CPT | Performed by: SPECIALIST

## 2023-01-18 PROCEDURE — 1090F PRES/ABSN URINE INCON ASSESS: CPT | Performed by: SPECIALIST

## 2023-01-18 PROCEDURE — G8417 CALC BMI ABV UP PARAM F/U: HCPCS | Performed by: SPECIALIST

## 2023-01-18 PROCEDURE — 99214 OFFICE O/P EST MOD 30 MIN: CPT | Performed by: SPECIALIST

## 2023-01-18 PROCEDURE — G8536 NO DOC ELDER MAL SCRN: HCPCS | Performed by: SPECIALIST

## 2023-01-18 PROCEDURE — G8432 DEP SCR NOT DOC, RNG: HCPCS | Performed by: SPECIALIST

## 2023-01-18 PROCEDURE — 1123F ACP DISCUSS/DSCN MKR DOCD: CPT | Performed by: SPECIALIST

## 2023-01-18 PROCEDURE — G0463 HOSPITAL OUTPT CLINIC VISIT: HCPCS | Performed by: SPECIALIST

## 2023-01-18 PROCEDURE — G8400 PT W/DXA NO RESULTS DOC: HCPCS | Performed by: SPECIALIST

## 2023-01-18 RX ORDER — DORZOLAMIDE HCL 20 MG/ML
1 SOLUTION/ DROPS OPHTHALMIC 2 TIMES DAILY
COMMUNITY

## 2023-01-18 RX ORDER — CARVEDILOL 12.5 MG/1
12.5 TABLET ORAL 2 TIMES DAILY WITH MEALS
Qty: 180 TABLET | Refills: 3 | Status: SHIPPED | OUTPATIENT
Start: 2023-01-18

## 2023-01-18 NOTE — PROGRESS NOTES
Liliane Johnson is a 81 y.o. female    Vitals:    01/18/23 1403 01/18/23 1418   BP: (!) 150/80 (!) 150/80   BP 1 Location: Left upper arm Left upper arm   BP Patient Position: Sitting Sitting   BP Cuff Size: Adult Adult   Pulse: 78    Height: 4' 8\" (1.422 m)    Weight: 134 lb (60.8 kg)    SpO2: 98%        Chief Complaint   Patient presents with    Coronary Artery Disease    Cholesterol Problem    Hypertension    Other     RBBB       Chest pain NO  SOB YES, WHEN USING STAIRS  Dizziness NO  Swelling ANKLES  Recent hospital visit NO  Refills NO  COVID VACCINE YES  HAD COVID?  NO

## 2023-01-18 NOTE — PROGRESS NOTES
Donald Vargas MD. [facility]              Patient: Lynn Hernandez  : 1940      Today's Date: 2023        HISTORY OF PRESENT ILLNESS:     History of Present Illness:    Here for follow-up. Has a lot of stress in her life,  recently moved into nursing home because of dementia. Still has swelling in her ankles, she thinks it has worsened. Has HIGHTOWER with stairs but not other activity or at baseline. Still not exercising much, trying to walk some. Denies chest pain  Checks BP at home, has been fluctuating between 130-160/70-80s      PAST MEDICAL HISTORY:     Past Medical History:   Diagnosis Date    Arthritis     CAD (coronary artery disease)     CT Heart Scan 14 - CAC Score 534, (80th%)    Diabetes mellitus ([address]     Diverticulosis     Dyslipidemia     GERD (gastroesophageal reflux disease)     Glaucoma     HTN (hypertension)     Obesity     Plantar fasciitis     S/P hip replacement     S/P knee replacement     Sleep apnea     on CPAP       Past Surgical History:   Procedure Laterality Date    HX APPENDECTOMY      Date in question    HX BLEPHAROPLASTY      Eye lid lift    HX CATARACT REMOVAL      HX CHOLECYSTECTOMY      HX HYSTERECTOMY      HX KNEE REPLACEMENT      left,     HX KNEE REPLACEMENT Right 2019    HX OTHER SURGICAL      CT Heart Scan 14 - CAC Score 534, (80th%). Prominent lymph nodes. HX OTHER SURGICAL      Exercise cardiolite (14) - walked 3:15 (4.6 METS) - no ischemic EKG changes. Normal MPI (+ breast attenuation). LVEF 70%. CURRENT MEDICATIONS:    .  Current Outpatient Medications   Medication Sig Dispense Refill    dorzolamide (TRUSOPT) 2 % ophthalmic solution Administer 1 Drop to left eye two (2) times a day. rosuvastatin (CRESTOR) 10 mg tablet Take 1 tablet by mouth nightly 90 Tablet 0    folic acid/multivit-min/lutein (CENTRUM SILVER PO) Take  by mouth. bismuth subsalicylate (ANTI-DIARRHEAL PO) Take  by mouth. acetaminophen (TYLENOL) 500 mg tablet Take 500 mg by mouth every six (6) hours as needed for Pain.      trolamine salicylate (ASPERCREME EX) by Apply Externally route. peg 400-propylene glycol (Systane, propylene glycoL,) 0.4-0.3 % drop 1 Drop as needed. spironolactone (ALDACTONE) 25 mg tablet Take 1 Tablet by mouth daily. 90 Tablet 0    amLODIPine (NORVASC) 5 mg tablet Take 1 Tablet by mouth daily. 90 Tablet 3    carvediloL (COREG) 3.125 mg tablet Take 1 Tablet by mouth two (2) times daily (with meals). 180 Tablet 2    brimonidine-timoloL (Combigan) 0.2-0.5 % drop ophthalmic solution 1 Drop every twelve (12) hours. metFORMIN (GLUCOPHAGE) 500 mg tablet Take  by mouth nightly. fexofenadine (ALLEGRA) 180 mg tablet Take  by mouth daily as needed. aspirin 81 mg chewable tablet Take 1 Tab by mouth daily. 30 Tab 11       Allergies   Allergen Reactions    Aller Xt-Tree Pollen-[place] Itching     OAK TREE, RED EYES, ITCHING    Adhesive Rash    Animal Dander Other (comments)     Cats. Stuffy nose    Betadine [Povidone-Iodine] Rash    Darvon [Propoxyphene] Nausea Only    Hay Fever And Allergy Relief Cough and Sneezing    Mold Other (comments)     Stuff nose             SOCIAL HISTORY:     Social History     Tobacco Use    Smoking status: Never    Smokeless tobacco: Never   Vaping Use    Vaping Use: Never used   Substance Use Topics    Alcohol use: Yes     Comment: 1 glass of wine daily     Drug use: No           FAMILY HISTORY:     Family History   Problem Relation Age of Onset    Stroke Mother     COPD Father            REVIEW OF SYSTEMS:        Review of Systems:    Constitutional: Negative for fever, chills    HEENT: Glaucoma is worse    Respiratory: Negative for cough    Cardiovascular: Negative for orthopnea, syncope, and PND. Gastrointestinal: Negative for abdominal pain or melena    Genitourinary: Negative for dysuria    Musculoskeletal: Negative for myalgias.   + joint pain   Skin: Negative for rash    Heme: No problems bleeding. Neurological: Negative for speech change and focal weakness. PHYSICAL EXAM:       Physical Exam:  Visit Vitals  BP (!) 150/80 (BP 1 Location: Left upper arm, BP Patient Position: Sitting, BP Cuff Size: Adult)   Pulse 78   Ht 4' 8\" (1.422 m)   Wt 134 lb (60.8 kg)   SpO2 98%   BMI 30.04 kg/m²          Patient appears generally well, mood and affect are appropriate and pleasant. HEENT: Hearing intact, non-icteric, normocephalic, atraumatic. Neck Exam: Supple, No JVD    Lung Exam: Clear to auscultation, even breath sounds. Cardiac Exam: Regular rate and rhythm with no murmur  Abdomen: Soft, non-tender   Extremities: Moves all ext well. Trace LE edema  Good ROM  Psych: Appropriate affect  Neuro - Grossly intact              LABS / OTHER STUDIES:           Labs 8/5/20 - CMP OK, chol 141, HDL 67, LDL 59, TG 73   Labs 8/21 - CMP OK, chol 148, LDL 64, TSH 1.8  Labs 2/22 - BMP OK except Na 129, CBC OK, A1c 6.6, chol 137, HDL 54, LDL 55,                LABS / OTHER STUDIES reviewed:         EKG 4/16/14 - NSR, normal    EKG 6/29/15 - NSR, normal   EKG 12/21/16 - NSR, normal   EKG 11/27/17 - NSR, normal   EKG 11/26/18 - NSR, PRWP  EKG 12/30/19 - NSR, RBBB  EKG 3/8/21 - NSR, RBBB  EKG 8/23/21 - NSR, RBBB          Carotid Dopplers 2/1/17 - mild plaque - 0-49% stenosis bilat   Exercise Cardiolite 4/4/17 - walked 3:16 (4.6 METS). No CP or ischemic EKG changes. Normal MPI. LVEF 69%  Echo 4/4/17 - mild LVH. LVEF 55-60%      Exercise Cardiolite 9/16/19 - walked 4:32 (5.5 METS), No CP and normal stress EKG. Normal MPI. LVEF 72%. /88 at baseline. Echo 1/16/20 - LVEF 62%     LE PVR's 3/22/21 - normal rest and exercise CARLEE's      Exercise Cardiolite 4/2/21 - walked 4:20 (5.6 METS), normal stress EKG.   Normal MPI, LVEF 80%    Echo 9/13/22 - LVEF 64%, mild MG                 ASSESSMENT AND PLAN:        Assessment and Plan:    1) CAD    - CT Heart Scan 4/17/14 shows CAC Score 534, (80th%)     - CAC score 2019 was ~ 1200   - Exercise Cardiolite 4/2/21 - walked 4:20 (5.6 METS), normal stress EKG. Normal MPI, LVEF 80%  - For prevention of CV events, continue ASA 81 and statin . - work on a healthy diet and exercise    - She denies anginal chest pain, but is tired past couple of years    - Has stable class 2 HIGHTOWER  - can consider a cath for worsening complaints - check an echo next visit       2) Fatigue  - I don't think her fatigue is cardiac in nature --- she feels under stress and does not exercise   - Despite making medicine changes (stopping atenolol and switching statin) her symptoms persist  - Exercise Cardiolite 9/16/19 - walked 4:32 (5.5 METS), No CP and normal stress EKG. Normal MPI. LVEF 72%. /88 at baseline.    - Exercise Cardiolite 4/2/21 - walked 4:20 (5.6 METS), normal stress EKG. Normal MPI, LVEF 80%  - I asked her to exercise       2) SHEREE on CPAP      3) Dyslipidemia -    - cont statin - prior labs OK   - labs followed by PCP       4) HTN  - Since Na was low (129 on 2/22), she had thiazide diuretic held  - On 9/22 - BP higher than goal - Will cut norvasc in half due to leg swelling and start aldactone 12.5 mg daily. Follow labs. - On 1/18/23 -  -150's at home often.  --> increase Coreg to 12.5 mg BID (in steps) --- continue other meds and see Natasha Woodard in 6 weeks. 5) Diabetes  - management per PCP      6) See NP in 6 weeks   Was exercises at [facility] 3 days a week until COVID. Has 3 cats.  in care facility (Dementia)          Cristal Reyes MD, [address]  [address] [place] [address], [address]      [place] [address]  [address]  Nain Desai [address] [place] [address]  Ph: [phone]                               Ph [phone]

## 2023-02-16 LAB — HBA1C MFR BLD HPLC: 6.8 %

## 2023-02-17 ENCOUNTER — DOCUMENTATION ONLY (OUTPATIENT)
Dept: CARDIOLOGY CLINIC | Age: 83
End: 2023-02-17

## 2023-02-17 NOTE — PROGRESS NOTES
Received labs from 2/14/23  Na 138, K 5.2, BUN 16, Cr 0.88, Hgb 13.9, Hgb a1C 6.8%  , HDL 38, LDL 48,

## 2023-03-01 ENCOUNTER — OFFICE VISIT (OUTPATIENT)
Dept: CARDIOLOGY CLINIC | Age: 83
End: 2023-03-01
Payer: MEDICARE

## 2023-03-01 VITALS
OXYGEN SATURATION: 97 % | SYSTOLIC BLOOD PRESSURE: 136 MMHG | DIASTOLIC BLOOD PRESSURE: 68 MMHG | HEART RATE: 58 BPM | HEIGHT: 56 IN | BODY MASS INDEX: 28.75 KG/M2 | WEIGHT: 127.8 LBS

## 2023-03-01 DIAGNOSIS — R06.09 DOE (DYSPNEA ON EXERTION): Primary | ICD-10-CM

## 2023-03-01 DIAGNOSIS — E78.5 HYPERLIPIDEMIA, UNSPECIFIED HYPERLIPIDEMIA TYPE: ICD-10-CM

## 2023-03-01 DIAGNOSIS — I25.10 CORONARY ARTERY DISEASE INVOLVING NATIVE CORONARY ARTERY OF NATIVE HEART WITHOUT ANGINA PECTORIS: ICD-10-CM

## 2023-03-01 DIAGNOSIS — I10 ESSENTIAL HYPERTENSION: ICD-10-CM

## 2023-03-01 DIAGNOSIS — R53.83 FATIGUE, UNSPECIFIED TYPE: ICD-10-CM

## 2023-03-01 PROCEDURE — 3078F DIAST BP <80 MM HG: CPT

## 2023-03-01 PROCEDURE — 3075F SYST BP GE 130 - 139MM HG: CPT

## 2023-03-01 PROCEDURE — 99214 OFFICE O/P EST MOD 30 MIN: CPT

## 2023-03-01 PROCEDURE — G0463 HOSPITAL OUTPT CLINIC VISIT: HCPCS

## 2023-03-01 PROCEDURE — 1123F ACP DISCUSS/DSCN MKR DOCD: CPT

## 2023-03-01 NOTE — LETTER
3/1/2023    Patient: Ann Perrin   YOB: 1940   Date of Visit: 3/1/2023     Cheryle Reid, [address] [facility] [address]  Via Fax: [phone]    Dear Cheryle Reid, MD,      Thank you for referring Ms. Magda Carlisle to [address] [place] [address] for evaluation. My notes for this consultation are attached. If you have questions, please do not hesitate to call me. I look forward to following your patient along with you.       Sincerely,    ABDELRAHMAN Beauchamp

## 2023-03-01 NOTE — PROGRESS NOTES
Kelle Saini is a 81 y.o. female    Chief Complaint   Patient presents with    Follow-up     6 week     Coronary Artery Disease    Hypertension    Cholesterol Problem       Vitals:    03/01/23 1034   BP: 136/68   BP 1 Location: Left upper arm   BP Patient Position: Sitting   Pulse: (!) 58   Height: 4' 8\" (1.422 m)   Weight: 127 lb 12.8 oz (58 kg)   SpO2: 97%   Patient states she is having trouble with her eyes which is her main issue seeing eye doctor. Chest pain a few episodes this past week could have been indigestion     SOB patient states SOB    Dizziness states dizziness comes and goes     Swelling no more than usual     Refills no      1. Have you been to the ER, urgent care clinic since your last visit? Hospitalized since your last visit? No    2. Have you seen or consulted any other health care providers outside of the [address] [place] [address] since your last visit? Include any pap smears or colon screening.  No

## 2023-04-12 ENCOUNTER — ANCILLARY PROCEDURE (OUTPATIENT)
Dept: CARDIOLOGY CLINIC | Age: 83
End: 2023-04-12
Payer: MEDICARE

## 2023-04-12 VITALS — BODY MASS INDEX: 28.57 KG/M2 | HEIGHT: 56 IN | WEIGHT: 127 LBS

## 2023-04-12 DIAGNOSIS — R53.83 FATIGUE, UNSPECIFIED TYPE: ICD-10-CM

## 2023-04-12 DIAGNOSIS — R06.09 DOE (DYSPNEA ON EXERTION): ICD-10-CM

## 2023-04-12 LAB
NUC STRESS EJECTION FRACTION: 76 %
STRESS BASELINE DIAS BP: 74 MMHG
STRESS BASELINE HR: 61 BPM
STRESS BASELINE SYS BP: 130 MMHG
STRESS O2 SAT PEAK: 100 %
STRESS O2 SAT REST: 100 %
STRESS PEAK DIAS BP: 68 MMHG
STRESS PEAK SYS BP: 128 MMHG
STRESS PERCENT HR ACHIEVED: 65 %
STRESS POST PEAK HR: 91 BPM
STRESS RATE PRESSURE PRODUCT: NORMAL BPM*MMHG
STRESS ST DEPRESSION: 0 MM
STRESS TARGET HR: 139 BPM
TID: 1.21

## 2023-04-12 PROCEDURE — 93018 CV STRESS TEST I&R ONLY: CPT | Performed by: SPECIALIST

## 2023-04-12 PROCEDURE — 78452 HT MUSCLE IMAGE SPECT MULT: CPT | Performed by: SPECIALIST

## 2023-04-12 PROCEDURE — 93017 CV STRESS TEST TRACING ONLY: CPT | Performed by: SPECIALIST

## 2023-04-12 PROCEDURE — 93016 CV STRESS TEST SUPVJ ONLY: CPT | Performed by: SPECIALIST

## 2023-04-12 PROCEDURE — A9500 TC99M SESTAMIBI: HCPCS | Performed by: SPECIALIST

## 2023-04-12 RX ORDER — TETRAKIS(2-METHOXYISOBUTYLISOCYANIDE)COPPER(I) TETRAFLUOROBORATE 1 MG/ML
26.2 INJECTION, POWDER, LYOPHILIZED, FOR SOLUTION INTRAVENOUS ONCE
Status: COMPLETED | OUTPATIENT
Start: 2023-04-12 | End: 2023-04-12

## 2023-04-12 RX ORDER — REGADENOSON 0.08 MG/ML
0.4 INJECTION, SOLUTION INTRAVENOUS ONCE
Status: COMPLETED | OUTPATIENT
Start: 2023-04-12 | End: 2023-04-12

## 2023-04-12 RX ORDER — TETRAKIS(2-METHOXYISOBUTYLISOCYANIDE)COPPER(I) TETRAFLUOROBORATE 1 MG/ML
8.3 INJECTION, POWDER, LYOPHILIZED, FOR SOLUTION INTRAVENOUS ONCE
Status: COMPLETED | OUTPATIENT
Start: 2023-04-12 | End: 2023-04-12

## 2023-04-12 RX ADMIN — REGADENOSON 0.4 MG: 0.08 INJECTION, SOLUTION INTRAVENOUS at 10:49

## 2023-04-12 RX ADMIN — TECHNETIUM TC 99M SESTAMIBI 8.3 MILLICURIE: 1 INJECTION, POWDER, FOR SOLUTION INTRAVENOUS at 09:17

## 2023-04-12 RX ADMIN — TECHNETIUM TC 99M SESTAMIBI 26.2 MILLICURIE: 1 INJECTION, POWDER, FOR SOLUTION INTRAVENOUS at 10:47

## 2023-04-12 NOTE — PROGRESS NOTES
Dear Ms. Leija,    Your stress test was normal.   Please let me know if you have any questions.    Best Regards,  ABDELRAHMAN Alan

## 2023-04-18 NOTE — PROGRESS NOTES
Patient: Gerber Higgins  : [birth year]    Primary Cardiologist: Zack Merritt MD. [facility] [place]  Last Office Visit: 3/1/23      Today's Date: 2023        HISTORY OF PRESENT ILLNESS:     History of Present Illness:    Doing ok. Her  is in hospital for small stroke. Under some stress. Continues with exertional dyspnea at times. BP at home 120-130/60-70. Rare SBP of 150. Denies lightheadedness, dizziness. Recent stress test and echo reviewed. From last OV \"She presents today for follow-up. Tells me that she has fatigue with associated shortness of breath with walking upstairs, walking from house to mailbox. Feels like symptoms have worsened  since she last saw Dr. Alok Emmanuel in January. Complains of edema in lower extremities. Had 2 episodes of chest pain, was in the center of her chest, felt like squeezing. She was driving at the time, went on for 15 minutes. Had an episode in the grocery store where she had to lean against the shelf in the store. This lasted 2-3 minutes. Also felt like squeezing. Is able to walk 1/3 of mile every day with fatigue. Continues to be under a lot of stress. Feels mildly depressed, speaks with psychologist regularly. Has BP log at today's appointment BP at home mostly 100-140, there have been 1-2 occasions with SBP 90. She says she feels fine, denies lightheadedness, dizziness.  \"      PAST MEDICAL HISTORY:     Past Medical History:   Diagnosis Date    Arthritis     CAD (coronary artery disease)     CT Heart Scan 14 - CAC Score 534, (80th%)    Diabetes mellitus ([facility] [address]     Diverticulosis     Dyslipidemia     GERD (gastroesophageal reflux disease)     Glaucoma     HTN (hypertension)     Obesity     Plantar fasciitis     S/P hip replacement     S/P knee replacement     Sleep apnea     on CPAP       Past Surgical History:   Procedure Laterality Date    HX APPENDECTOMY      Date in question    HX BLEPHAROPLASTY      Eye lid lift    HX CATARACT REMOVAL      HX CHOLECYSTECTOMY      HX HYSTERECTOMY  1980    HX KNEE REPLACEMENT      left, 2013    HX KNEE REPLACEMENT Right 2019    HX OTHER SURGICAL      CT Heart Scan 4/17/14 - CAC Score 534, (80th%). Prominent lymph nodes. HX OTHER SURGICAL      Exercise cardiolite (5/19/14) - walked 3:15 (4.6 METS) - no ischemic EKG changes. Normal MPI (+ breast attenuation). LVEF 70%. CURRENT MEDICATIONS:    .  Current Outpatient Medications   Medication Sig Dispense Refill    dorzolamide (TRUSOPT) 2 % ophthalmic solution Administer 1 Drop to both eyes two (2) times a day. carvediloL (COREG) 12.5 mg tablet Take 1 Tablet by mouth two (2) times daily (with meals). 180 Tablet 3    rosuvastatin (CRESTOR) 10 mg tablet Take 1 tablet by mouth nightly 90 Tablet 0    folic acid/multivit-min/lutein (CENTRUM SILVER PO) Take  by mouth. bismuth subsalicylate (ANTI-DIARRHEAL PO) Take  by mouth. acetaminophen (TYLENOL) 500 mg tablet Take 1 Tablet by mouth every six (6) hours as needed for Pain.      trolamine salicylate (ASPERCREME EX) by Apply Externally route. peg 400-propylene glycol (Systane, propylene glycoL,) 0.4-0.3 % drop 1 Drop as needed. spironolactone (ALDACTONE) 25 mg tablet Take 1 Tablet by mouth daily. 90 Tablet 0    amLODIPine (NORVASC) 5 mg tablet Take 1 Tablet by mouth daily. 90 Tablet 3    brimonidine-timoloL (Combigan) 0.2-0.5 % drop ophthalmic solution 1 Drop every twelve (12) hours. metFORMIN (GLUCOPHAGE) 500 mg tablet Take  by mouth nightly. fexofenadine (ALLEGRA) 180 mg tablet Take  by mouth daily as needed. aspirin 81 mg chewable tablet Take 1 Tab by mouth daily. 30 Tab 11       Allergies   Allergen Reactions    Aller Xt-Tree Pollen[place] Itching     OAK TREE, RED EYES, ITCHING    Adhesive Rash    Animal Dander Other (comments)     Cats.  Stuffy nose    Betadine [Povidone-Iodine] Rash    Darvon [Propoxyphene] Nausea Only    Hay Fever And Allergy Relief Cough and Sneezing Mold Other (comments)     Stuff nose             SOCIAL HISTORY:     Social History     Tobacco Use    Smoking status: Never    Smokeless tobacco: Never   Vaping Use    Vaping Use: Never used   Substance Use Topics    Alcohol use: Yes     Comment: 1 glass of wine daily     Drug use: No           FAMILY HISTORY:     Family History   Problem Relation Age of Onset    Stroke Mother     COPD Father            REVIEW OF SYSTEMS:        Review of Systems:    Constitutional: Negative for fever, chills    HEENT: Glaucoma is worse    Respiratory: Negative for cough    Cardiovascular: Negative for orthopnea, syncope, and PND. Gastrointestinal: Negative for abdominal pain or melena    Genitourinary: Negative for dysuria    Musculoskeletal: Negative for myalgias. + joint pain   Skin: Negative for rash    Heme: No problems bleeding. Neurological: Negative for speech change and focal weakness. PHYSICAL EXAM:       Physical Exam:  Visit Vitals  /70 (BP 1 Location: Right arm)   Pulse 67   Resp 16   Ht 4' 8\" (1.422 m)   Wt 130 lb 6.4 oz (59.1 kg)   SpO2 98%   BMI 29.24 kg/m²              Patient appears generally well, mood and affect are appropriate and pleasant. HEENT: Hearing intact, non-icteric, normocephalic, atraumatic. Neck Exam: Supple, No JVD    Lung Exam: Clear to auscultation, even breath sounds. Cardiac Exam: Regular rate and rhythm with no murmur  Abdomen: Soft, non-tender   Extremities: Moves all ext well.   Trace LE edema  Good ROM  Psych: Appropriate affect  Neuro - Grossly intact              LABS / OTHER STUDIES:           Labs 8/5/20 - CMP OK, chol 141, HDL 67, LDL 59, TG 73   Labs 8/21 - CMP OK, chol 148, LDL 64, TSH 1.8  Labs 2/22 - BMP OK except Na 129, CBC OK, A1c 6.6, chol 137, HDL 54, LDL 55,    Labs 2/14/23 - Na 138, K 5.2, BUN 16, Cr 0.88, Hgb 13.9, Hgb a1C 6.8%, , HDL 38, LDL 48,            LABS / OTHER STUDIES reviewed:         EKG 4/16/14 - NSR, normal    EKG 6/29/15 - NSR, normal   EKG 12/21/16 - NSR, normal   EKG 11/27/17 - NSR, normal   EKG 11/26/18 - NSR, PRWP  EKG 12/30/19 - NSR, RBBB  EKG 3/8/21 - NSR, RBBB  EKG 8/23/21 - NSR, RBBB          Carotid Dopplers 2/1/17 - mild plaque - 0-49% stenosis bilat   Exercise Cardiolite 4/4/17 - walked 3:16 (4.6 METS). No CP or ischemic EKG changes. Normal MPI. LVEF 69%  Echo 4/4/17 - mild LVH. LVEF 55-60%      Exercise Cardiolite 9/16/19 - walked 4:32 (5.5 METS), No CP and normal stress EKG. Normal MPI. LVEF 72%. /88 at baseline. Echo 1/16/20 - LVEF 62%     LE PVR's 3/22/21 - normal rest and exercise CARLEE's      Exercise Cardiolite 4/2/21 - walked 4:20 (5.6 METS), normal stress EKG. Normal MPI, LVEF 80%    Echo 9/13/22 - LVEF 64%, mild MG    Echo 4/12/23 - LVEF 63%, mild MG     Nuclear Stress test 4/12/23 - LV perfusion is normal.  SSS is just 1. No significant perfusion defects. No significant ischemia. Stress Function: Left ventricular function post-stress is normal. Post-stress ejection fraction is 76%. ASSESSMENT AND PLAN:        Assessment and Plan:    1) CAD    - CT Heart Scan 4/17/14 shows CAC Score 534, (80th%)     - CAC score 2019 was ~ 1200   - Exercise Cardiolite 4/2/21 - walked 4:20 (5.6 METS), normal stress EKG. Normal MPI, LVEF 80%  - For prevention of CV events, continue ASA 81 and statin  - work on a healthy diet and exercise    - She denies anginal chest pain, but is tired past couple of years    - Has stable class 2 HIGHTOWER  - echo and nuclear stress test were normal       2) Fatigue  - I don't think her fatigue is cardiac in nature --- she feels under stress and does not exercise   - Despite making medicine changes (stopping atenolol and switching statin) her symptoms persist  - Exercise Cardiolite 9/16/19 - walked 4:32 (5.5 METS), No CP and normal stress EKG. Normal MPI. LVEF 72%.    /88 at baseline.    - Exercise Cardiolite 4/2/21 - walked 4:20 (5.6 METS), normal stress EKG. Normal MPI, LVEF 80%  - I asked her to exercise   - continues with fatigue      2) SHEREE on CPAP      3) Dyslipidemia -    - cont statin - prior labs OK   - labs followed by PCP       4) HTN  - Since Na was low (129 on 2/22), she had thiazide diuretic held  - On 9/22 - BP higher than goal - Will cut norvasc in half due to leg swelling and start aldactone 12.5 mg daily. Follow labs. - On 1/18/23 -  -150's at home often.  --> increase Coreg to 12.5 mg BID (in steps)   - SBP at home 110-140s, rare SBP 90s --> asymptomatic, advised to take amlodipine at night  - on 4/19/23 - BP at home 110-130s/60-70s, rare , no medication changes at this time    5) Diabetes  - management per PCP      Was exercises at [facility] 3 days a week until COVID. Has 3 cats.  in care facility (Dementia)      She will see Dr. Mauricio Woodard in 6 months. Moe Hernandez, RM    [address], [address]  [place] [address]  [address]  Nain Desai [address] [place] [address]  Ph: [phone]   Ph [phone]

## 2023-04-19 ENCOUNTER — OFFICE VISIT (OUTPATIENT)
Dept: CARDIOLOGY CLINIC | Age: 83
End: 2023-04-19
Payer: MEDICARE

## 2023-04-19 VITALS
WEIGHT: 130.4 LBS | DIASTOLIC BLOOD PRESSURE: 70 MMHG | OXYGEN SATURATION: 98 % | HEIGHT: 56 IN | BODY MASS INDEX: 29.33 KG/M2 | RESPIRATION RATE: 16 BRPM | HEART RATE: 67 BPM | SYSTOLIC BLOOD PRESSURE: 122 MMHG

## 2023-04-19 DIAGNOSIS — E78.5 HYPERLIPIDEMIA, UNSPECIFIED HYPERLIPIDEMIA TYPE: ICD-10-CM

## 2023-04-19 DIAGNOSIS — R53.83 FATIGUE, UNSPECIFIED TYPE: ICD-10-CM

## 2023-04-19 DIAGNOSIS — I25.10 CORONARY ARTERY DISEASE INVOLVING NATIVE CORONARY ARTERY OF NATIVE HEART WITHOUT ANGINA PECTORIS: Primary | ICD-10-CM

## 2023-04-19 DIAGNOSIS — R06.09 DOE (DYSPNEA ON EXERTION): ICD-10-CM

## 2023-04-19 DIAGNOSIS — I10 ESSENTIAL HYPERTENSION: ICD-10-CM

## 2023-04-19 PROCEDURE — G0463 HOSPITAL OUTPT CLINIC VISIT: HCPCS

## 2023-04-19 NOTE — PROGRESS NOTES
Fatigue. Stable   in hospital at [facility] is a 81 y.o. female    Chief Complaint   Patient presents with    Coronary Artery Disease    Hypertension    Cholesterol Problem       Vitals:    04/19/23 0859 04/19/23 0910   BP: (!) 152/84 122/70   BP 1 Location: Left upper arm Right arm   BP Patient Position: Sitting    BP Cuff Size: Adult    Pulse: 67    Resp: 16    Height: 4' 8\" (1.422 m)    Weight: 130 lb 6.4 oz (59.1 kg)    SpO2: 98%        Chest pain NO    SOB Shortness of breath with short walks. Chronic. Stable. Dizziness NO    Swelling Chronic ankle swelling. Bilateral    Refills NO      1. Have you been to the ER, urgent care clinic since your last visit? Hospitalized since your last visit? No    2. Have you seen or consulted any other health care providers outside of the [address] [place] [address] since your last visit? Include any pap smears or colon screening.  No

## 2023-04-25 RX ORDER — ROSUVASTATIN CALCIUM 10 MG/1
TABLET, COATED ORAL
Qty: 90 TABLET | Refills: 3 | Status: SHIPPED | OUTPATIENT
Start: 2023-04-25

## 2023-04-25 NOTE — TELEPHONE ENCOUNTER
Refill per VO of Dr. Aurelia Santiago  Last appt: 4/19/23  Future Appointments   Date Time Provider Gene Amaya   10/20/2023 10:40 AM Misti Hendrickson MD [facility]       Requested Prescriptions     Signed Prescriptions Disp Refills    rosuvastatin (CRESTOR) 10 mg tablet 90 Tablet 3     Sig: Take 1 tablet by mouth nightly     Authorizing Provider: Armond Pena     Ordering User: [facility]

## 2023-05-19 RX ORDER — FEXOFENADINE HCL 180 MG/1
TABLET ORAL DAILY PRN
COMMUNITY

## 2023-05-19 RX ORDER — CARVEDILOL 12.5 MG/1
12.5 TABLET ORAL 2 TIMES DAILY WITH MEALS
COMMUNITY
Start: 2023-01-18

## 2023-05-19 RX ORDER — AMLODIPINE BESYLATE 5 MG/1
5 TABLET ORAL DAILY
COMMUNITY
Start: 2022-09-13

## 2023-05-19 RX ORDER — ACETAMINOPHEN 500 MG
500 TABLET ORAL EVERY 6 HOURS PRN
COMMUNITY

## 2023-05-19 RX ORDER — ASPIRIN 81 MG/1
81 TABLET, CHEWABLE ORAL DAILY
COMMUNITY
Start: 2014-04-28

## 2023-05-19 RX ORDER — ROSUVASTATIN CALCIUM 10 MG/1
1 TABLET, COATED ORAL NIGHTLY
COMMUNITY
Start: 2023-04-25

## 2023-05-19 RX ORDER — DORZOLAMIDE HCL 20 MG/ML
1 SOLUTION/ DROPS OPHTHALMIC 2 TIMES DAILY
COMMUNITY

## 2023-05-19 RX ORDER — SPIRONOLACTONE 25 MG/1
25 TABLET ORAL DAILY
COMMUNITY
Start: 2022-09-30

## 2023-05-19 RX ORDER — BRIMONIDINE TARTRATE AND TIMOLOL MALEATE 2; 5 MG/ML; MG/ML
1 SOLUTION OPHTHALMIC EVERY 12 HOURS
COMMUNITY

## 2023-08-18 LAB
HBA1C MFR BLD HPLC: 6.2 %
MICROALBUMIN/CREATININE RATIO, EXTERNAL: 26.3

## 2023-10-04 RX ORDER — SPIRONOLACTONE 25 MG/1
25 TABLET ORAL DAILY
Qty: 90 TABLET | Refills: 1 | Status: SHIPPED | OUTPATIENT
Start: 2023-10-04

## 2023-10-26 ENCOUNTER — OFFICE VISIT (OUTPATIENT)
Age: 83
End: 2023-10-26
Payer: MEDICARE

## 2023-10-26 VITALS
DIASTOLIC BLOOD PRESSURE: 60 MMHG | BODY MASS INDEX: 27.29 KG/M2 | WEIGHT: 130 LBS | HEIGHT: 58 IN | SYSTOLIC BLOOD PRESSURE: 120 MMHG | HEART RATE: 61 BPM | OXYGEN SATURATION: 99 %

## 2023-10-26 DIAGNOSIS — I45.10 RBBB: ICD-10-CM

## 2023-10-26 DIAGNOSIS — I10 PRIMARY HYPERTENSION: ICD-10-CM

## 2023-10-26 DIAGNOSIS — E78.5 HYPERLIPIDEMIA, UNSPECIFIED HYPERLIPIDEMIA TYPE: ICD-10-CM

## 2023-10-26 DIAGNOSIS — I25.118 CORONARY ARTERY DISEASE OF NATIVE ARTERY OF NATIVE HEART WITH STABLE ANGINA PECTORIS (HCC): Primary | ICD-10-CM

## 2023-10-26 PROCEDURE — G8400 PT W/DXA NO RESULTS DOC: HCPCS | Performed by: SPECIALIST

## 2023-10-26 PROCEDURE — 99214 OFFICE O/P EST MOD 30 MIN: CPT | Performed by: SPECIALIST

## 2023-10-26 PROCEDURE — G8427 DOCREV CUR MEDS BY ELIG CLIN: HCPCS | Performed by: SPECIALIST

## 2023-10-26 PROCEDURE — 1036F TOBACCO NON-USER: CPT | Performed by: SPECIALIST

## 2023-10-26 PROCEDURE — 3074F SYST BP LT 130 MM HG: CPT | Performed by: SPECIALIST

## 2023-10-26 PROCEDURE — G8484 FLU IMMUNIZE NO ADMIN: HCPCS | Performed by: SPECIALIST

## 2023-10-26 PROCEDURE — 1090F PRES/ABSN URINE INCON ASSESS: CPT | Performed by: SPECIALIST

## 2023-10-26 PROCEDURE — G8419 CALC BMI OUT NRM PARAM NOF/U: HCPCS | Performed by: SPECIALIST

## 2023-10-26 PROCEDURE — 1123F ACP DISCUSS/DSCN MKR DOCD: CPT | Performed by: SPECIALIST

## 2023-10-26 PROCEDURE — 3078F DIAST BP <80 MM HG: CPT | Performed by: SPECIALIST

## 2023-10-26 RX ORDER — NETARSUDIL 0.2 MG/ML
SOLUTION/ DROPS OPHTHALMIC; TOPICAL
COMMUNITY

## 2023-10-26 RX ORDER — PREDNISOLONE ACETATE 10 MG/ML
1 SUSPENSION/ DROPS OPHTHALMIC 2 TIMES DAILY
COMMUNITY

## 2024-03-11 RX ORDER — CARVEDILOL 12.5 MG/1
12.5 TABLET ORAL 2 TIMES DAILY WITH MEALS
Qty: 180 TABLET | Refills: 1 | Status: SHIPPED | OUTPATIENT
Start: 2024-03-11

## 2024-03-11 NOTE — TELEPHONE ENCOUNTER
Refill per VO of Dr. Teague  Last appt: 10/26/2023    Future Appointments   Date Time Provider Department Center   5/2/2024 11:00 AM Jodie Alcazar, APRN - NP [facility]       Requested Prescriptions     Signed Prescriptions Disp Refills    carvedilol (COREG) 12.5 MG tablet 180 tablet 1     Sig: TAKE 1 TABLET BY MOUTH TWICE DAILY WITH MEALS     Authorizing Provider: VINCENT TEAGUE     Ordering User: EULALIO MCLEOD

## 2024-04-19 RX ORDER — SPIRONOLACTONE 25 MG/1
25 TABLET ORAL DAILY
Qty: 90 TABLET | Refills: 1 | Status: SHIPPED | OUTPATIENT
Start: 2024-04-19

## 2024-04-19 NOTE — TELEPHONE ENCOUNTER
Refill per VO of Dr. Teague  Last appt: 10/26/2023    Future Appointments   Date Time Provider Department Center   5/2/2024 11:00 AM Jodie Alcazar, APRN - NP [facility]       Requested Prescriptions     Signed Prescriptions Disp Refills    spironolactone (ALDACTONE) 25 MG tablet 90 tablet 1     Sig: Take 1 tablet by mouth once daily     Authorizing Provider: VINCENT TEAGUE     Ordering User: NATHEN ROJAS

## 2024-04-29 ENCOUNTER — APPOINTMENT (OUTPATIENT)
Facility: HOSPITAL | Age: 84
End: 2024-04-29
Payer: MEDICARE

## 2024-04-29 ENCOUNTER — HOSPITAL ENCOUNTER (EMERGENCY)
Facility: HOSPITAL | Age: 84
Discharge: HOME | End: 2024-04-29
Attending: EMERGENCY MEDICINE
Payer: MEDICARE

## 2024-04-29 VITALS
HEART RATE: 71 BPM | HEIGHT: 58 IN | TEMPERATURE: 98.6 F | OXYGEN SATURATION: 98 % | RESPIRATION RATE: 16 BRPM | SYSTOLIC BLOOD PRESSURE: 131 MMHG | WEIGHT: 129 LBS | DIASTOLIC BLOOD PRESSURE: 71 MMHG | BODY MASS INDEX: 27.08 KG/M2

## 2024-04-29 DIAGNOSIS — M25.552 PAIN OF LEFT HIP: Primary | ICD-10-CM

## 2024-04-29 DIAGNOSIS — R10.32 LEFT LOWER QUADRANT ABDOMINAL PAIN: ICD-10-CM

## 2024-04-29 LAB
ANION GAP SERPL CALC-SCNC: 9 MMOL/L (ref 5–15)
BASOPHILS # BLD: 0 K/UL (ref 0–0.1)
BASOPHILS NFR BLD: 0 % (ref 0–1)
BUN SERPL-MCNC: 14 MG/DL (ref 8–23)
BUN/CREAT SERPL: 21 (ref 12–20)
CALCIUM SERPL-MCNC: 9.6 MG/DL (ref 8.8–10.2)
CHLORIDE SERPL-SCNC: 103 MMOL/L (ref 98–107)
CO2 SERPL-SCNC: 25 MMOL/L (ref 22–29)
CREAT SERPL-MCNC: 0.66 MG/DL (ref 0.5–0.9)
DIFFERENTIAL METHOD BLD: ABNORMAL
EOSINOPHIL # BLD: 1.5 K/UL (ref 0–0.4)
EOSINOPHIL NFR BLD: 19 % (ref 0–7)
ERYTHROCYTE [DISTWIDTH] IN BLOOD BY AUTOMATED COUNT: 13.2 % (ref 11.5–14.5)
GLUCOSE SERPL-MCNC: 122 MG/DL (ref 65–100)
HCT VFR BLD AUTO: 43.2 % (ref 35–47)
HGB BLD-MCNC: 13.5 G/DL (ref 11.5–16)
IMM GRANULOCYTES # BLD AUTO: 0 K/UL
IMM GRANULOCYTES NFR BLD AUTO: 0 %
LYMPHOCYTES # BLD: 1.5 K/UL (ref 0.8–3.5)
LYMPHOCYTES NFR BLD: 19 % (ref 12–49)
MCH RBC QN AUTO: 27.2 PG (ref 26–34)
MCHC RBC AUTO-ENTMCNC: 31.3 G/DL (ref 30–36.5)
MCV RBC AUTO: 86.9 FL (ref 80–99)
MONOCYTES # BLD: 0.8 K/UL (ref 0–1)
MONOCYTES NFR BLD: 10 % (ref 5–13)
NEUTS SEG # BLD: 4.1 K/UL (ref 1.8–8)
NEUTS SEG NFR BLD: 52 % (ref 32–75)
NRBC # BLD: 0 K/UL (ref 0–0.01)
NRBC BLD-RTO: 0 PER 100 WBC
PLATELET # BLD AUTO: 210 K/UL (ref 150–400)
PMV BLD AUTO: 10.3 FL (ref 8.9–12.9)
POTASSIUM SERPL-SCNC: 4.7 MMOL/L (ref 3.5–5.1)
RBC # BLD AUTO: 4.97 M/UL (ref 3.8–5.2)
RBC MORPH BLD: ABNORMAL
SODIUM SERPL-SCNC: 137 MMOL/L (ref 136–145)
WBC # BLD AUTO: 7.9 K/UL (ref 3.6–11)

## 2024-04-29 PROCEDURE — 85025 COMPLETE CBC W/AUTO DIFF WBC: CPT

## 2024-04-29 PROCEDURE — 74177 CT ABD & PELVIS W/CONTRAST: CPT

## 2024-04-29 PROCEDURE — 36415 COLL VENOUS BLD VENIPUNCTURE: CPT

## 2024-04-29 PROCEDURE — 99285 EMERGENCY DEPT VISIT HI MDM: CPT

## 2024-04-29 PROCEDURE — 73502 X-RAY EXAM HIP UNI 2-3 VIEWS: CPT

## 2024-04-29 PROCEDURE — 6360000004 HC RX CONTRAST MEDICATION: Performed by: EMERGENCY MEDICINE

## 2024-04-29 PROCEDURE — 80048 BASIC METABOLIC PNL TOTAL CA: CPT

## 2024-04-29 RX ADMIN — IOPAMIDOL 100 ML: 755 INJECTION, SOLUTION INTRAVENOUS at 15:19

## 2024-04-29 ASSESSMENT — PAIN SCALES - GENERAL: PAINLEVEL_OUTOF10: 7

## 2024-04-29 ASSESSMENT — PAIN - FUNCTIONAL ASSESSMENT: PAIN_FUNCTIONAL_ASSESSMENT: 0-10

## 2024-04-29 NOTE — ED PROVIDER NOTES
[facility] EMERGENCY DEPT  EMERGENCY DEPARTMENT ENCOUNTER      Pt Name: Domitila Landaverde  MRN: [id number]  Birthdate 1940  Date of evaluation: 4/29/2024  Provider: Navjot Pack DO    CHIEF COMPLAINT       Chief Complaint   Patient presents with    Abdominal Pain         HISTORY OF PRESENT ILLNESS   (Location/Symptom, Timing/Onset, Context/Setting, Quality, Duration, Modifying Factors, Severity)  Note limiting factors.   82 y/o with diverticulosis presents from PCP office with concern for diverticulitis.    Patient has had RLQ abdominal pain for about a month now. Has taken Augmentin x 10 days, then subsequently cipro/flagyl for another 10 days. Ultimately has not had significant change in terms of her symptoms that are present today: 7/10 achey pain located in her LLQ abdomen but more specifically seemingly into her inguinal/groin region. This pain is constant and doesn't specifically have alleviating or aggravating factors. She has had diarrhea unchanged 3-6 bouts per day for the past month or so.     No fever, chills, nausea, vomiting, hematochezia.            Review of External Medical Records:     Nursing Notes were reviewed.    REVIEW OF SYSTEMS    (2-9 systems for level 4, 10 or more for level 5)     Review of Systems    Except as noted above the remainder of the review of systems was reviewed and negative.       PAST MEDICAL HISTORY     Past Medical History:   Diagnosis Date    Arthritis     CAD (coronary artery disease)     CT Heart Scan 4/17/14 - CAC Score 534, (80th%)    Diabetes mellitus (HCC)     Diverticulosis     Dyslipidemia     GERD (gastroesophageal reflux disease)     Glaucoma     HTN (hypertension)     Obesity     Plantar fasciitis     S/P hip replacement     S/P knee replacement     Sleep apnea     on CPAP         SURGICAL HISTORY       Past Surgical History:   Procedure Laterality Date    APPENDECTOMY  1952    Date in question    BLEPHAROPLASTY      Eye lid lift    CATARACT REMOVAL

## 2024-04-29 NOTE — ED TRIAGE NOTES
Pt arrives with potential diverticulitis since Easter. Pt has been been on a couple round of ABX. Referred from PCP for ct scan. Reports pain is LLQ, constant dull pain that worsen with movement. Reports diarrhea. Pt reports history of diverticulosis.

## 2024-04-29 NOTE — DISCHARGE INSTRUCTIONS
You can use anti-inflammatories at home as we discussed    Please call your PCP to get an appointment, I recommend you start physical therapy

## 2024-05-15 NOTE — PROGRESS NOTES
Patient: Domitila Landaverde  : 1940    Primary Cardiologist: James Teague MD. [facility]  Last Office Visit: 10/26/23    Today's Date: 2024        HISTORY OF PRESENT ILLNESS:     History of Present Illness:  Presents today for follow-up. Having issues with diarrhea. Being worked up with GI. Says it has been off and on for the last 2 months.   Having hip issues as well, going through physical therapy.   Denies chest pain.   Says she has fatigue. Says she sleeps for 2-3 hours and then can't go back to sleep.   Going up and down stairs she has some shortness of breath. Says it is worse recently.   Denies palpitations.   Says she has some edema, goes away after being at rest.   Pain across her back.   Says  passed away in 2023.     PAST MEDICAL HISTORY:     Past Medical History:   Diagnosis Date    Arthritis     CAD (coronary artery disease)     CT Heart Scan 14 - CAC Score 534, (80th%)    Diabetes mellitus (HCC)     Diverticulosis     Dyslipidemia     GERD (gastroesophageal reflux disease)     Glaucoma     HTN (hypertension)     Obesity     Plantar fasciitis     S/P hip replacement     S/P knee replacement     Sleep apnea     on CPAP       Past Surgical History:   Procedure Laterality Date    APPENDECTOMY      Date in question    BLEPHAROPLASTY      Eye lid lift    CATARACT REMOVAL      CHOLECYSTECTOMY      HYSTERECTOMY (CERVIX STATUS UNKNOWN)      OTHER SURGICAL HISTORY      Exercise cardiolite (14) - walked 3:15 (4.6 METS) - no ischemic EKG changes. Normal MPI (+ breast attenuation). LVEF 70%.      OTHER SURGICAL HISTORY      CT Heart Scan 14 - CAC Score 534, (80th%).  Prominent lymph nodes.     TOTAL KNEE ARTHROPLASTY      left, 2013    TOTAL KNEE ARTHROPLASTY Right 2019             CURRENT MEDICATIONS:    .  Current Outpatient Medications   Medication Sig Dispense Refill    spironolactone (ALDACTONE) 25 MG tablet Take 1 tablet by mouth once daily 90 tablet 1

## 2024-05-23 ENCOUNTER — OFFICE VISIT (OUTPATIENT)
Age: 84
End: 2024-05-23
Payer: MEDICARE

## 2024-05-23 VITALS
BODY MASS INDEX: 26.66 KG/M2 | DIASTOLIC BLOOD PRESSURE: 60 MMHG | OXYGEN SATURATION: 97 % | RESPIRATION RATE: 16 BRPM | SYSTOLIC BLOOD PRESSURE: 114 MMHG | HEART RATE: 86 BPM | WEIGHT: 127 LBS | HEIGHT: 58 IN

## 2024-05-23 DIAGNOSIS — I25.118 CORONARY ARTERY DISEASE OF NATIVE ARTERY OF NATIVE HEART WITH STABLE ANGINA PECTORIS (HCC): Primary | ICD-10-CM

## 2024-05-23 DIAGNOSIS — I10 PRIMARY HYPERTENSION: ICD-10-CM

## 2024-05-23 DIAGNOSIS — R06.09 OTHER FORMS OF DYSPNEA: ICD-10-CM

## 2024-05-23 DIAGNOSIS — E78.5 HYPERLIPIDEMIA, UNSPECIFIED HYPERLIPIDEMIA TYPE: ICD-10-CM

## 2024-05-23 DIAGNOSIS — R53.83 OTHER FATIGUE: ICD-10-CM

## 2024-05-23 PROCEDURE — 3078F DIAST BP <80 MM HG: CPT

## 2024-05-23 PROCEDURE — 1123F ACP DISCUSS/DSCN MKR DOCD: CPT

## 2024-05-23 PROCEDURE — 99214 OFFICE O/P EST MOD 30 MIN: CPT

## 2024-05-23 PROCEDURE — 1090F PRES/ABSN URINE INCON ASSESS: CPT

## 2024-05-23 PROCEDURE — G8419 CALC BMI OUT NRM PARAM NOF/U: HCPCS

## 2024-05-23 PROCEDURE — 1036F TOBACCO NON-USER: CPT

## 2024-05-23 PROCEDURE — G8400 PT W/DXA NO RESULTS DOC: HCPCS

## 2024-05-23 PROCEDURE — 93010 ELECTROCARDIOGRAM REPORT: CPT

## 2024-05-23 PROCEDURE — G8427 DOCREV CUR MEDS BY ELIG CLIN: HCPCS

## 2024-05-23 PROCEDURE — 93005 ELECTROCARDIOGRAM TRACING: CPT

## 2024-05-23 PROCEDURE — 3074F SYST BP LT 130 MM HG: CPT

## 2024-05-23 NOTE — PROGRESS NOTES
had concerns including Coronary Artery Disease, Hypertension, and Other (RBBB).    Vitals:    05/23/24 1504   BP: 114/60   Site: Left Upper Arm   Position: Sitting   Pulse: 86   Resp: 16   SpO2: 97%   Weight: 57.6 kg (127 lb)   Height: 1.473 m (4' 10\")        Having a culture for GI issues     Chest pain No    Refills No        1. Have you been to the ER, urgent care clinic since your last visit? yes      Hospitalized since your last visit? No       Where?   [facility]      Date?  4/29/24

## 2024-06-11 RX ORDER — ROSUVASTATIN CALCIUM 10 MG/1
TABLET, COATED ORAL NIGHTLY
Qty: 90 TABLET | Refills: 3 | Status: SHIPPED | OUTPATIENT
Start: 2024-06-11

## 2024-06-11 NOTE — TELEPHONE ENCOUNTER
Refill per VO of Dr. Teague  Last appt: 10/26/2023    Future Appointments   Date Time Provider Department Center   12/2/2024  9:00 AM James Teague MD [facility]       Requested Prescriptions     Signed Prescriptions Disp Refills    rosuvastatin (CRESTOR) 10 MG tablet 90 tablet 3     Sig: Take 1 tablet by mouth nightly     Authorizing Provider: JAMES TEAGUE     Ordering User: EULALIO MCLEOD

## 2024-10-07 RX ORDER — CARVEDILOL 12.5 MG/1
12.5 TABLET ORAL 2 TIMES DAILY WITH MEALS
Qty: 180 TABLET | Refills: 0 | Status: SHIPPED | OUTPATIENT
Start: 2024-10-07

## 2024-11-04 RX ORDER — SPIRONOLACTONE 25 MG/1
25 TABLET ORAL DAILY
Qty: 90 TABLET | Refills: 3 | Status: SHIPPED | OUTPATIENT
Start: 2024-11-04

## 2024-11-04 NOTE — TELEPHONE ENCOUNTER
Refill per VO of Dr. Teague  Last appt: 10/26/2023    Future Appointments   Date Time Provider Department Center   12/2/2024  9:00 AM James Teague MD [facility]       Requested Prescriptions     Signed Prescriptions Disp Refills    spironolactone (ALDACTONE) 25 MG tablet 90 tablet 3     Sig: Take 1 tablet by mouth once daily     Authorizing Provider: JAMES TEAGUE     Ordering User: MARY MENDEZ

## 2024-11-11 ENCOUNTER — TELEPHONE (OUTPATIENT)
Age: 84
End: 2024-11-11

## 2024-11-11 NOTE — TELEPHONE ENCOUNTER
Patient called in stating that she has an episode at [employer], where she became extremely cold and started shaking to the point where she couldn't stop . Patient is requesting to please have an call back .       Patient #~ [phone]

## 2024-11-13 ENCOUNTER — TELEPHONE (OUTPATIENT)
Age: 84
End: 2024-11-13

## 2024-11-13 NOTE — TELEPHONE ENCOUNTER
Patient returning call to Cher BERTRAND from yesterday in regards to last encounter (11/11/24).     Patient phone # [phone]

## 2024-11-13 NOTE — TELEPHONE ENCOUNTER
Called pt,  She stated she had a strange experience in [employer] this Sunday.    She sings in choir.  All of a sudden, she became so cold, shaking so hard, couldn't hold music.  2 nurses in [employer] attended to her.    \"Took pulse, noted 92 p, then 98.  Capillary refill 5+.  RR?  Breathing irregular\",   Denied dizzy, nausea, fever, etc.  \"Just suddenly freezing.\"    This went on for over an hour.  Didn't lose consciousness.    Someone brought her home, stayed on sofa with warm blanket, 2 cats.  Noted feet still cold.  Ate soup for dinner, went to bed around 11.  Kept semi waking up through the nite & remembered one of the times her feet were finally warm.    Has been c/o fatigue for a while.    This came on all of a sudden.  She would like to get things checked out prior to apt w Dr. Teague early December.    Discussed that MD doesn't have any sooner appts, however NP tomorrow at [facility] (preferred site) does.  Will ask to have EKG done.  Future Appointments   Date Time Provider Department Center   11/14/2024 10:20 AM Jodie Alcazar APRN - [facility]   12/2/2024  9:00 AM James Teague MD [facility]

## 2024-11-14 ENCOUNTER — OFFICE VISIT (OUTPATIENT)
Age: 84
End: 2024-11-14
Payer: MEDICARE

## 2024-11-14 VITALS
SYSTOLIC BLOOD PRESSURE: 126 MMHG | HEIGHT: 56 IN | DIASTOLIC BLOOD PRESSURE: 68 MMHG | HEART RATE: 72 BPM | WEIGHT: 128.4 LBS | OXYGEN SATURATION: 96 % | BODY MASS INDEX: 28.88 KG/M2

## 2024-11-14 DIAGNOSIS — I25.118 CORONARY ARTERY DISEASE OF NATIVE ARTERY OF NATIVE HEART WITH STABLE ANGINA PECTORIS (HCC): Primary | ICD-10-CM

## 2024-11-14 DIAGNOSIS — R06.09 OTHER FORMS OF DYSPNEA: ICD-10-CM

## 2024-11-14 DIAGNOSIS — R53.83 OTHER FATIGUE: ICD-10-CM

## 2024-11-14 DIAGNOSIS — E78.5 HYPERLIPIDEMIA, UNSPECIFIED HYPERLIPIDEMIA TYPE: ICD-10-CM

## 2024-11-14 DIAGNOSIS — I10 PRIMARY HYPERTENSION: ICD-10-CM

## 2024-11-14 PROCEDURE — 99214 OFFICE O/P EST MOD 30 MIN: CPT

## 2024-11-14 PROCEDURE — 93005 ELECTROCARDIOGRAM TRACING: CPT

## 2024-11-14 RX ORDER — ACETAMINOPHEN 160 MG
2000 TABLET,DISINTEGRATING ORAL DAILY
COMMUNITY

## 2024-11-14 RX ORDER — TROLAMINE SALICYLATE 10 G/100G
CREAM TOPICAL AS NEEDED
COMMUNITY

## 2024-11-14 NOTE — PROGRESS NOTES
had concerns including Coronary Artery Disease and Hypertension.    Vitals:    11/14/24 1025   BP: 126/68   Site: Left Upper Arm   Position: Sitting   Pulse: 72   SpO2: 96%   Weight: 58.2 kg (128 lb 6.4 oz)   Height: 1.422 m (4' 8\")        Chest pain No    Refills No        1. Have you been to the ER, urgent care clinic since your last visit? No       Hospitalized since your last visit? No       Where?        Date?

## 2024-11-14 NOTE — PROGRESS NOTES
Patient: Domitila Landaverde  : 1940    Primary Cardiologist: James Teague MD. [facility]  Last Office Visit: 10/26/23    Today's Date: 2024        HISTORY OF PRESENT ILLNESS:     History of Present Illness:  Presents today for follow-up. Had an episode at [employer] over the weekend where she became cold and shaky. Says it lasted for about an hour. Says a nurse in the choir checked on her. Denies nausea, dizziness at the time. Only felt cold. Says her respiratory rate was elevated at the time. Denies further episodes since.   Does not sleep well normally.   Denies chest pain. Says body felt achy. Breathing feels worse on exertion in the last few months, such as when climbing steps.   Having hip issues, seeing ortho.   Has had C. Diff multiple times during this year.     PAST MEDICAL HISTORY:     Past Medical History:   Diagnosis Date    Arthritis     CAD (coronary artery disease)     CT Heart Scan 14 - CAC Score 534, (80th%)    Diabetes mellitus (HCC)     Diverticulosis     Dyslipidemia     GERD (gastroesophageal reflux disease)     Glaucoma     HTN (hypertension)     Obesity     Plantar fasciitis     S/P hip replacement     S/P knee replacement     Sleep apnea     on CPAP       Past Surgical History:   Procedure Laterality Date    APPENDECTOMY      Date in question    BLEPHAROPLASTY      Eye lid lift    CATARACT REMOVAL      CHOLECYSTECTOMY      HYSTERECTOMY (CERVIX STATUS UNKNOWN)      OTHER SURGICAL HISTORY      Exercise cardiolite (14) - walked 3:15 (4.6 METS) - no ischemic EKG changes. Normal MPI (+ breast attenuation). LVEF 70%.      OTHER SURGICAL HISTORY      CT Heart Scan 14 - CAC Score 534, (80th%).  Prominent lymph nodes.     TOTAL KNEE ARTHROPLASTY      left, 2013    TOTAL KNEE ARTHROPLASTY Right 2019             CURRENT MEDICATIONS:    .  Current Outpatient Medications   Medication Sig Dispense Refill    bismuth subsalicylate (PEPTO BISMOL) 262 MG/15ML suspension Take

## 2024-11-19 ENCOUNTER — HOSPITAL ENCOUNTER (OUTPATIENT)
Facility: HOSPITAL | Age: 84
Discharge: HOME | End: 2024-11-21
Payer: MEDICARE

## 2024-11-19 DIAGNOSIS — R53.83 OTHER FATIGUE: ICD-10-CM

## 2024-11-19 DIAGNOSIS — R06.09 OTHER FORMS OF DYSPNEA: ICD-10-CM

## 2024-11-19 DIAGNOSIS — I25.118 CORONARY ARTERY DISEASE OF NATIVE ARTERY OF NATIVE HEART WITH STABLE ANGINA PECTORIS (HCC): ICD-10-CM

## 2024-11-19 LAB
ECHO AO ASC DIAM: 2.5 CM
ECHO AO ROOT DIAM: 2.5 CM
ECHO AV AREA PEAK VELOCITY: 1.3 CM2
ECHO AV AREA VTI: 1.5 CM2
ECHO AV MEAN GRADIENT: 4 MMHG
ECHO AV MEAN VELOCITY: 0.9 M/S
ECHO AV PEAK GRADIENT: 7 MMHG
ECHO AV PEAK VELOCITY: 1.3 M/S
ECHO AV VELOCITY RATIO: 0.62
ECHO AV VTI: 27.2 CM
ECHO EST RA PRESSURE: 3 MMHG
ECHO LA AREA 2C: 8.2 CM2
ECHO LA AREA 4C: 13.4 CM2
ECHO LA DIAMETER: 3.8 CM
ECHO LA MAJOR AXIS: 4.8 CM
ECHO LA MINOR AXIS: 4.7 CM
ECHO LA TO AORTIC ROOT RATIO: 1.52
ECHO LA VOL BP: 18 ML (ref 22–52)
ECHO LA VOL MOD A2C: 11 ML (ref 22–52)
ECHO LA VOL MOD A4C: 29 ML (ref 22–52)
ECHO LV E' LATERAL VELOCITY: 7.2 CM/S
ECHO LV E' SEPTAL VELOCITY: 4.3 CM/S
ECHO LV EDV A2C: 16 ML
ECHO LV EDV A4C: 32 ML
ECHO LV EF PHYSICIAN: 50 %
ECHO LV EJECTION FRACTION A2C: 49 %
ECHO LV EJECTION FRACTION A4C: 50 %
ECHO LV ESV A2C: 8 ML
ECHO LV ESV A4C: 16 ML
ECHO LV FRACTIONAL SHORTENING: 38 % (ref 28–44)
ECHO LV INTERNAL DIMENSION DIASTOLIC: 3.7 CM (ref 3.9–5.3)
ECHO LV INTERNAL DIMENSION SYSTOLIC: 2.3 CM
ECHO LV IVSD: 1 CM (ref 0.6–0.9)
ECHO LV MASS 2D: 120.8 G (ref 67–162)
ECHO LV POSTERIOR WALL DIASTOLIC: 1.1 CM (ref 0.6–0.9)
ECHO LV RELATIVE WALL THICKNESS RATIO: 0.59
ECHO LVOT AREA: 2.3 CM2
ECHO LVOT AV VTI INDEX: 0.65
ECHO LVOT DIAM: 1.7 CM
ECHO LVOT MEAN GRADIENT: 1 MMHG
ECHO LVOT PEAK GRADIENT: 2 MMHG
ECHO LVOT PEAK VELOCITY: 0.8 M/S
ECHO LVOT SV: 40.4 ML
ECHO LVOT VTI: 17.8 CM
ECHO MV A VELOCITY: 0.93 M/S
ECHO MV E VELOCITY: 0.61 M/S
ECHO MV E/A RATIO: 0.66
ECHO MV E/E' LATERAL: 8.47
ECHO MV E/E' RATIO (AVERAGED): 11.33
ECHO MV E/E' SEPTAL: 14.19
ECHO PV MAX VELOCITY: 0.6 M/S
ECHO PV MEAN GRADIENT: 1 MMHG
ECHO PV MEAN VELOCITY: 0.4 M/S
ECHO PV PEAK GRADIENT: 2 MMHG
ECHO PV VTI: 11.5 CM
ECHO RA AREA 4C: 7.8 CM2
ECHO RA VOLUME: 13 ML
ECHO RIGHT VENTRICULAR SYSTOLIC PRESSURE (RVSP): 8 MMHG
ECHO RV BASAL DIMENSION: 3.2 CM
ECHO RV FREE WALL PEAK S': 10.7 CM/S
ECHO RV MID DIMENSION: 2 CM
ECHO RV TAPSE: 2.5 CM (ref 1.7–?)
ECHO TV REGURGITANT MAX VELOCITY: 1.07 M/S
ECHO TV REGURGITANT PEAK GRADIENT: 5 MMHG
LV EF: 60 ML

## 2024-11-19 PROCEDURE — 93306 TTE W/DOPPLER COMPLETE: CPT

## 2024-11-19 NOTE — RESULT ENCOUNTER NOTE
Dear Ms. Landaverde,  Good News!  Your echo shows stable findings.   Please let me know if you have questions.  Best Regards,  SIMA Morataya NP

## 2024-12-02 ENCOUNTER — OFFICE VISIT (OUTPATIENT)
Age: 84
End: 2024-12-02
Payer: MEDICARE

## 2024-12-02 ENCOUNTER — TELEPHONE (OUTPATIENT)
Age: 84
End: 2024-12-02

## 2024-12-02 VITALS
OXYGEN SATURATION: 94 % | WEIGHT: 128 LBS | SYSTOLIC BLOOD PRESSURE: 110 MMHG | DIASTOLIC BLOOD PRESSURE: 70 MMHG | HEIGHT: 58 IN | HEART RATE: 67 BPM | BODY MASS INDEX: 26.87 KG/M2

## 2024-12-02 DIAGNOSIS — R06.09 DYSPNEA ON EXERTION: Primary | ICD-10-CM

## 2024-12-02 DIAGNOSIS — R53.83 OTHER FATIGUE: ICD-10-CM

## 2024-12-02 DIAGNOSIS — I25.10 CORONARY ARTERY DISEASE INVOLVING NATIVE CORONARY ARTERY OF NATIVE HEART WITHOUT ANGINA PECTORIS: Primary | ICD-10-CM

## 2024-12-02 DIAGNOSIS — R06.02 SOB (SHORTNESS OF BREATH): ICD-10-CM

## 2024-12-02 PROCEDURE — 3078F DIAST BP <80 MM HG: CPT | Performed by: SPECIALIST

## 2024-12-02 PROCEDURE — G8419 CALC BMI OUT NRM PARAM NOF/U: HCPCS | Performed by: SPECIALIST

## 2024-12-02 PROCEDURE — 1159F MED LIST DOCD IN RCRD: CPT | Performed by: SPECIALIST

## 2024-12-02 PROCEDURE — G8427 DOCREV CUR MEDS BY ELIG CLIN: HCPCS | Performed by: SPECIALIST

## 2024-12-02 PROCEDURE — 1123F ACP DISCUSS/DSCN MKR DOCD: CPT | Performed by: SPECIALIST

## 2024-12-02 PROCEDURE — 99214 OFFICE O/P EST MOD 30 MIN: CPT | Performed by: SPECIALIST

## 2024-12-02 PROCEDURE — 3074F SYST BP LT 130 MM HG: CPT | Performed by: SPECIALIST

## 2024-12-02 PROCEDURE — G8400 PT W/DXA NO RESULTS DOC: HCPCS | Performed by: SPECIALIST

## 2024-12-02 PROCEDURE — 1090F PRES/ABSN URINE INCON ASSESS: CPT | Performed by: SPECIALIST

## 2024-12-02 PROCEDURE — 1036F TOBACCO NON-USER: CPT | Performed by: SPECIALIST

## 2024-12-02 PROCEDURE — 1160F RVW MEDS BY RX/DR IN RCRD: CPT | Performed by: SPECIALIST

## 2024-12-02 PROCEDURE — G8484 FLU IMMUNIZE NO ADMIN: HCPCS | Performed by: SPECIALIST

## 2024-12-02 NOTE — TELEPHONE ENCOUNTER
Spoke with Pt regarding [facility] schd. For 12/12/2024 At 9:15 AM arrive at 7:45 AM. Pt aware that they need a  they must stay on site NPO from Midnight the night before. You can have clear liquids up to 2 hours prior to procedure. Please drink 10 8oz glasses of water 3 days prior and 3 days after Cath. Check in at the second floor Outpt. Reg. Desk. Pt is to have Labs done prior to 12/5/2024.     Medications:  Hold Metformin 24 hours prior to procedure     VO by Dr. Teague/nurse: Cher TORRE

## 2024-12-02 NOTE — PROGRESS NOTES
Chief Complaint   Patient presents with    Coronary Artery Disease    Shortness of Breath    Other     HDL     Vitals:    12/02/24 0915   BP: 110/70   Site: Left Upper Arm   Position: Sitting   Cuff Size: Medium Adult   Pulse: 67   SpO2: 94%   Weight: 58.1 kg (128 lb)   Height: 1.473 m (4' 10\")      /70 (Site: Left Upper Arm, Position: Sitting, Cuff Size: Medium Adult)   Pulse 67   Ht 1.473 m (4' 10\")   Wt 58.1 kg (128 lb)   SpO2 94%   BMI 26.75 kg/m²        
rash  Heme: No problems bleeding.  Neurological: Negative for speech change and focal weakness.         PHYSICAL EXAM:     Physical Exam:  /70 (Site: Left Upper Arm, Position: Sitting, Cuff Size: Medium Adult)   Pulse 67   Ht 1.473 m (4' 10\")   Wt 58.1 kg (128 lb)   SpO2 94%   BMI 26.75 kg/m²     Patient appears generally well, mood and affect are appropriate and pleasant.  HEENT:  Hearing intact, non-icteric, normocephalic, atraumatic.   Neck Exam: Supple, No JVD   Lung Exam: + crackles at right base    Cardiac Exam: Regular rate and rhythm with no murmur or rub  Abdomen: Soft, non-tender  Extremities: Moves all ext well. No lower extremity edema.  MSKTL: Overall good ROM ext  Skin: No significant rashes  Psych: Appropriate affect  Neuro - Grossly intact        LABS / OTHER STUDIES:     Lab Results   Component Value Date     04/29/2024    K 4.7 04/29/2024     04/29/2024    CO2 25 04/29/2024    BUN 14 04/29/2024    CREATININE 0.66 04/29/2024    GLUCOSE 122 (H) 04/29/2024    CALCIUM 9.6 04/29/2024    LABGLOM 88 04/29/2024    GFRAA 99 08/28/2019       Lab Results   Component Value Date    WBC 7.9 04/29/2024    HGB 13.5 04/29/2024    HCT 43.2 04/29/2024    MCV 86.9 04/29/2024     04/29/2024           CARDIAC DIAGNOSTICS:     Cardiac Evaluation Includes:  I reviewed the test results below.       EKG 11/26/18 - NSR, PRWP   EKG 12/30/19 - NSR, RBBB   EKG 3/8/21 - NSR, RBBB   EKG 8/23/21 - NSR, RBBB  EKG 9/13/22 - NSR, PRWP   EKG 11/14/24 - NSR, PRWP             Carotid Dopplers 2/1/17 - mild plaque - 0-49% stenosis bilat    Exercise Cardiolite 4/4/17 - walked 3:16 (4.6 METS). No CP or ischemic EKG changes. Normal MPI. LVEF 69%   Echo 4/4/17 - mild LVH. LVEF 55-60%        Exercise Cardiolite 9/16/19 - walked 4:32 (5.5 METS), No CP and normal stress EKG.  Normal MPI.  LVEF 72%.   /88 at baseline.         Echo 1/16/20 - LVEF 62%       LE PVR's 3/22/21 - normal rest and exercise HAMIDA's

## 2024-12-02 NOTE — H&P (VIEW-ONLY)
Chief Complaint   Patient presents with    Coronary Artery Disease    Shortness of Breath    Other     HDL     Vitals:    12/02/24 0915   BP: 110/70   Site: Left Upper Arm   Position: Sitting   Cuff Size: Medium Adult   Pulse: 67   SpO2: 94%   Weight: 58.1 kg (128 lb)   Height: 1.473 m (4' 10\")      /70 (Site: Left Upper Arm, Position: Sitting, Cuff Size: Medium Adult)   Pulse 67   Ht 1.473 m (4' 10\")   Wt 58.1 kg (128 lb)   SpO2 94%   BMI 26.75 kg/m²

## 2024-12-03 ENCOUNTER — HOSPITAL ENCOUNTER (OUTPATIENT)
Facility: HOSPITAL | Age: 84
Discharge: HOME | End: 2024-12-06
Payer: MEDICARE

## 2024-12-03 DIAGNOSIS — R06.02 SOB (SHORTNESS OF BREATH): ICD-10-CM

## 2024-12-03 PROCEDURE — 71046 X-RAY EXAM CHEST 2 VIEWS: CPT

## 2024-12-04 LAB
ALBUMIN SERPL-MCNC: 4.9 G/DL (ref 3.7–4.7)
ALP SERPL-CCNC: 89 IU/L (ref 44–121)
ALT SERPL-CCNC: 17 IU/L (ref 0–32)
AST SERPL-CCNC: 18 IU/L (ref 0–40)
BASOPHILS # BLD AUTO: 0 X10E3/UL (ref 0–0.2)
BASOPHILS NFR BLD AUTO: 1 %
BILIRUB SERPL-MCNC: 0.7 MG/DL (ref 0–1.2)
BUN SERPL-MCNC: 21 MG/DL (ref 8–27)
BUN/CREAT SERPL: 26 (ref 12–28)
CALCIUM SERPL-MCNC: 10.1 MG/DL (ref 8.7–10.3)
CHLORIDE SERPL-SCNC: 102 MMOL/L (ref 96–106)
CO2 SERPL-SCNC: 20 MMOL/L (ref 20–29)
CREAT SERPL-MCNC: 0.8 MG/DL (ref 0.57–1)
EGFRCR SERPLBLD CKD-EPI 2021: 73 ML/MIN/1.73
EOSINOPHIL # BLD AUTO: 0.3 X10E3/UL (ref 0–0.4)
EOSINOPHIL NFR BLD AUTO: 5 %
ERYTHROCYTE [DISTWIDTH] IN BLOOD BY AUTOMATED COUNT: 13.1 % (ref 11.7–15.4)
GLOBULIN SER CALC-MCNC: 2.4 G/DL (ref 1.5–4.5)
GLUCOSE SERPL-MCNC: 109 MG/DL (ref 70–99)
HCT VFR BLD AUTO: 45.5 % (ref 34–46.6)
HGB BLD-MCNC: 14.3 G/DL (ref 11.1–15.9)
IMM GRANULOCYTES # BLD AUTO: 0 X10E3/UL (ref 0–0.1)
IMM GRANULOCYTES NFR BLD AUTO: 0 %
LYMPHOCYTES # BLD AUTO: 1.7 X10E3/UL (ref 0.7–3.1)
LYMPHOCYTES NFR BLD AUTO: 27 %
MAGNESIUM SERPL-MCNC: 2.1 MG/DL (ref 1.6–2.3)
MCH RBC QN AUTO: 27.7 PG (ref 26.6–33)
MCHC RBC AUTO-ENTMCNC: 31.4 G/DL (ref 31.5–35.7)
MCV RBC AUTO: 88 FL (ref 79–97)
MONOCYTES # BLD AUTO: 0.6 X10E3/UL (ref 0.1–0.9)
MONOCYTES NFR BLD AUTO: 9 %
NEUTROPHILS # BLD AUTO: 3.6 X10E3/UL (ref 1.4–7)
NEUTROPHILS NFR BLD AUTO: 58 %
NT-PROBNP SERPL-MCNC: 111 PG/ML (ref 0–738)
PLATELET # BLD AUTO: 231 X10E3/UL (ref 150–450)
POTASSIUM SERPL-SCNC: 4.6 MMOL/L (ref 3.5–5.2)
PROT SERPL-MCNC: 7.3 G/DL (ref 6–8.5)
RBC # BLD AUTO: 5.17 X10E6/UL (ref 3.77–5.28)
SODIUM SERPL-SCNC: 141 MMOL/L (ref 134–144)
TSH SERPL DL<=0.005 MIU/L-ACNC: 1.34 UIU/ML (ref 0.45–4.5)
WBC # BLD AUTO: 6.1 X10E3/UL (ref 3.4–10.8)

## 2024-12-05 ENCOUNTER — DIRECT ADMIT ORDERS (OUTPATIENT)
Age: 84
End: 2024-12-05

## 2024-12-05 DIAGNOSIS — R06.09 DYSPNEA ON EXERTION: Primary | ICD-10-CM

## 2024-12-05 RX ORDER — SODIUM CHLORIDE 9 MG/ML
INJECTION, SOLUTION INTRAVENOUS PRN
OUTPATIENT
Start: 2024-12-12

## 2024-12-05 RX ORDER — SODIUM CHLORIDE 0.9 % (FLUSH) 0.9 %
5-40 SYRINGE (ML) INJECTION PRN
OUTPATIENT
Start: 2024-12-12

## 2024-12-05 RX ORDER — SODIUM CHLORIDE 0.9 % (FLUSH) 0.9 %
5-40 SYRINGE (ML) INJECTION EVERY 12 HOURS SCHEDULED
OUTPATIENT
Start: 2024-12-12 | End: 2024-12-12

## 2024-12-12 ENCOUNTER — TELEPHONE (OUTPATIENT)
Age: 84
End: 2024-12-12

## 2024-12-12 ENCOUNTER — HOSPITAL ENCOUNTER (OUTPATIENT)
Facility: HOSPITAL | Age: 84
Setting detail: OUTPATIENT SURGERY
Discharge: HOME | End: 2024-12-12
Attending: STUDENT IN AN ORGANIZED HEALTH CARE EDUCATION/TRAINING PROGRAM | Admitting: STUDENT IN AN ORGANIZED HEALTH CARE EDUCATION/TRAINING PROGRAM
Payer: MEDICARE

## 2024-12-12 VITALS
RESPIRATION RATE: 21 BRPM | OXYGEN SATURATION: 98 % | HEART RATE: 77 BPM | SYSTOLIC BLOOD PRESSURE: 119 MMHG | TEMPERATURE: 96.9 F | DIASTOLIC BLOOD PRESSURE: 63 MMHG

## 2024-12-12 DIAGNOSIS — R06.09 DYSPNEA ON EXERTION: ICD-10-CM

## 2024-12-12 DIAGNOSIS — I25.10 CORONARY ARTERY DISEASE, UNSPECIFIED VESSEL OR LESION TYPE, UNSPECIFIED WHETHER ANGINA PRESENT, UNSPECIFIED WHETHER NATIVE OR TRANSPLANTED HEART: Primary | ICD-10-CM

## 2024-12-12 LAB
GLUCOSE BLD STRIP.AUTO-MCNC: 166 MG/DL (ref 65–117)
SERVICE CMNT-IMP: ABNORMAL

## 2024-12-12 PROCEDURE — C1894 INTRO/SHEATH, NON-LASER: HCPCS | Performed by: STUDENT IN AN ORGANIZED HEALTH CARE EDUCATION/TRAINING PROGRAM

## 2024-12-12 PROCEDURE — 93458 L HRT ARTERY/VENTRICLE ANGIO: CPT | Performed by: STUDENT IN AN ORGANIZED HEALTH CARE EDUCATION/TRAINING PROGRAM

## 2024-12-12 PROCEDURE — 6360000004 HC RX CONTRAST MEDICATION: Performed by: STUDENT IN AN ORGANIZED HEALTH CARE EDUCATION/TRAINING PROGRAM

## 2024-12-12 PROCEDURE — C1769 GUIDE WIRE: HCPCS | Performed by: STUDENT IN AN ORGANIZED HEALTH CARE EDUCATION/TRAINING PROGRAM

## 2024-12-12 PROCEDURE — 82962 GLUCOSE BLOOD TEST: CPT

## 2024-12-12 PROCEDURE — 76937 US GUIDE VASCULAR ACCESS: CPT | Performed by: STUDENT IN AN ORGANIZED HEALTH CARE EDUCATION/TRAINING PROGRAM

## 2024-12-12 PROCEDURE — 99152 MOD SED SAME PHYS/QHP 5/>YRS: CPT | Performed by: STUDENT IN AN ORGANIZED HEALTH CARE EDUCATION/TRAINING PROGRAM

## 2024-12-12 PROCEDURE — 2500000003 HC RX 250 WO HCPCS: Performed by: STUDENT IN AN ORGANIZED HEALTH CARE EDUCATION/TRAINING PROGRAM

## 2024-12-12 PROCEDURE — 2709999900 HC NON-CHARGEABLE SUPPLY: Performed by: STUDENT IN AN ORGANIZED HEALTH CARE EDUCATION/TRAINING PROGRAM

## 2024-12-12 PROCEDURE — 6360000002 HC RX W HCPCS: Performed by: STUDENT IN AN ORGANIZED HEALTH CARE EDUCATION/TRAINING PROGRAM

## 2024-12-12 RX ORDER — ACETAMINOPHEN 325 MG/1
650 TABLET ORAL EVERY 4 HOURS PRN
Status: CANCELLED | OUTPATIENT
Start: 2024-12-12

## 2024-12-12 RX ORDER — HEPARIN SODIUM 1000 [USP'U]/ML
INJECTION, SOLUTION INTRAVENOUS; SUBCUTANEOUS PRN
Status: DISCONTINUED | OUTPATIENT
Start: 2024-12-12 | End: 2024-12-12 | Stop reason: HOSPADM

## 2024-12-12 RX ORDER — SODIUM CHLORIDE 0.9 % (FLUSH) 0.9 %
5-40 SYRINGE (ML) INJECTION EVERY 12 HOURS SCHEDULED
Status: DISCONTINUED | OUTPATIENT
Start: 2024-12-12 | End: 2024-12-12 | Stop reason: HOSPADM

## 2024-12-12 RX ORDER — LIDOCAINE HYDROCHLORIDE 10 MG/ML
INJECTION, SOLUTION INFILTRATION; PERINEURAL PRN
Status: DISCONTINUED | OUTPATIENT
Start: 2024-12-12 | End: 2024-12-12 | Stop reason: HOSPADM

## 2024-12-12 RX ORDER — CLOPIDOGREL BISULFATE 75 MG/1
75 TABLET ORAL DAILY
Qty: 90 TABLET | Refills: 1 | Status: SHIPPED | OUTPATIENT
Start: 2024-12-12

## 2024-12-12 RX ORDER — MIDAZOLAM HYDROCHLORIDE 1 MG/ML
INJECTION, SOLUTION INTRAMUSCULAR; INTRAVENOUS PRN
Status: DISCONTINUED | OUTPATIENT
Start: 2024-12-12 | End: 2024-12-12 | Stop reason: HOSPADM

## 2024-12-12 RX ORDER — VERAPAMIL HYDROCHLORIDE 2.5 MG/ML
INJECTION, SOLUTION INTRAVENOUS PRN
Status: DISCONTINUED | OUTPATIENT
Start: 2024-12-12 | End: 2024-12-12 | Stop reason: HOSPADM

## 2024-12-12 RX ORDER — IOPAMIDOL 755 MG/ML
INJECTION, SOLUTION INTRAVASCULAR PRN
Status: DISCONTINUED | OUTPATIENT
Start: 2024-12-12 | End: 2024-12-12 | Stop reason: HOSPADM

## 2024-12-12 RX ORDER — SODIUM CHLORIDE 0.9 % (FLUSH) 0.9 %
5-40 SYRINGE (ML) INJECTION PRN
Status: DISCONTINUED | OUTPATIENT
Start: 2024-12-12 | End: 2024-12-12 | Stop reason: HOSPADM

## 2024-12-12 RX ORDER — HEPARIN SODIUM 200 [USP'U]/100ML
INJECTION, SOLUTION INTRAVENOUS PRN
Status: DISCONTINUED | OUTPATIENT
Start: 2024-12-12 | End: 2024-12-12 | Stop reason: HOSPADM

## 2024-12-12 RX ORDER — FENTANYL CITRATE 50 UG/ML
INJECTION, SOLUTION INTRAMUSCULAR; INTRAVENOUS PRN
Status: DISCONTINUED | OUTPATIENT
Start: 2024-12-12 | End: 2024-12-12 | Stop reason: HOSPADM

## 2024-12-12 RX ORDER — SODIUM CHLORIDE 9 MG/ML
INJECTION, SOLUTION INTRAVENOUS PRN
Status: DISCONTINUED | OUTPATIENT
Start: 2024-12-12 | End: 2024-12-12 | Stop reason: HOSPADM

## 2024-12-12 NOTE — PROGRESS NOTES
0828:  Patient arrived. ID and allergies verified verbally with patient. Pt voices understanding of procedure to be performed. Consent obtained. Pt prepped for procedure.     1042:  TRANSFER - OUT REPORT:    Verbal report given to Renetta on Domitila Landaverde  being transferred to Cath Lab for ordered procedure       Report consisted of patient's Situation, Background, Assessment and   Recommendations(SBAR).     Information from the following report(s) Nurse Handoff Report was reviewed with the receiving nurse.           Lines:   Peripheral IV 12/12/24 Right Antecubital (Active)        Opportunity for questions and clarification was provided.      Patient transported with:  RT     1111:  TRANSFER - IN REPORT:    Verbal report received from Maureen Moffett  on Domitila Landaverde  being received from Cath lab for routine post-op      Report consisted of patient's Situation, Background, Assessment and   Recommendations(SBAR).     Information from the following report(s) Nurse Handoff Report was reviewed with the receiving nurse.    Opportunity for questions and clarification was provided.      Assessment completed upon patient's arrival to unit and care assumed.      1220:  2 ml air released from TR Band. No bleeding or hematoma noted. Radial and Ulnar pulse on Right wrist palpable. Pt tolerated well. Nurse at bedside monitoring.    1225:  2 ml air released from TR Band. No bleeding or hematoma noted. Radial and Ulnar pulse on right wrist palpable. Pt tolerated well.  Nurse at bedside monitoring.     1230:  3 ml air released from TR Band. No bleeding or hematoma noted. Radial and Ulnar pulse on right wrist palpable. Pt tolerated well. Nurse at bedside monitoring.     1235:  3 ml air released from TR Band. No bleeding or hematoma noted. Radial and Ulnar pulse on right wrist palpable. Pt tolerated well. Nurse at bedside monitoring.    1240:  4 ml air released from TR Band. No bleeding or hematoma noted. Radial and Ulnar pulse

## 2024-12-12 NOTE — PROGRESS NOTES
Discussed revascularization options with patient and daughter.      Syntax score 22.      Lesion amendable to pci    Patient declined CABG evaluation, which is reasonable given advanced age.    Plan ad hoc pci next week.

## 2024-12-12 NOTE — INTERVAL H&P NOTE
Update History & Physical    History and physical has been reviewed. The patient has been examined. There have been no significant clinical changes since the completion of the originally dated History and Physical.    Risk and benefit of cardiac catheterization/PCI was described in detail to patient.  (risk of vascular access complication with potential surgical intervention for management, stroke, myocardial infarction, emergent open heart surgery and death).  Patient wishes to proceed with coronary angiography with possible intervention.     Labs   Lab Results   Component Value Date    WBC 6.1 12/03/2024    HGB 14.3 12/03/2024    HCT 45.5 12/03/2024    MCV 88 12/03/2024     12/03/2024     Lab Results   Component Value Date/Time     12/03/2024 01:25 PM    K 4.6 12/03/2024 01:25 PM     12/03/2024 01:25 PM    CO2 20 12/03/2024 01:25 PM    BUN 21 12/03/2024 01:25 PM    CREATININE 0.80 12/03/2024 01:25 PM    GLUCOSE 109 12/03/2024 01:25 PM    CALCIUM 10.1 12/03/2024 01:25 PM    LABGLOM 73 12/03/2024 01:25 PM    LABGLOM 88 04/29/2024 02:15 PM    LABGLOM 88 11/17/2022 11:04 AM          ASA 3  Airway 3      Plan: The risks, benefits, expected outcome, and alternative to the recommended procedure have been discussed with the patient. Patient understands and wants to proceed with the procedure.     Electronically signed by Gabino Bone DO on 12/12/2024 at 10:35 AM       complains of pain/discomfort/RLQ pain

## 2024-12-12 NOTE — TELEPHONE ENCOUNTER
Spoke with Pt Regarding PCI schd. For 12/18/2024 At 8:00 AM arrive at 6:30 AM. Pt aware that they need a  they must stay on site NPO from Midnight the night before. You can have clear liquids up to 2 hours prior to procedure. Please drink 10 8oz glasses of water 3 days prior and 3 days after Cath. Check in at the second floor Outpt. Reg. Desk. Pt had labs done on 12/5/2024.     Medications:  Hold Metformin 24 hours prior to procedure      VO by Dr.Dr. Bone/nurse: Roseanne

## 2024-12-12 NOTE — DISCHARGE INSTRUCTIONS
Radial Cardiac Catheterization/Angiography Discharge Instructions    It is normal to feel tired the first couple days.  Take it easy and follow the physician’s instructions.      CHECK THE CATHETER INSERTION SITE DAILY:    Remove the wrist dressing 24 hours after the procedure.  You may shower 24 hours after the procedure.  Wash with soap and water and pat dry.  Gentle cleaning of the site with soap and water is sufficient, cover with a dry clean dressing or bandage.  Do not apply creams or powders to the area.  No soaking the wrist for 3 days.  Leave the puncture site open to air after 24 hours post-procedure.    CALL THE PHYSICIANS:     If the site becomes red, swollen or feels warm to the touch  If there is bleeding or drainage or if there is unusual pain at the radial site.     If there is any minor oozing, you may apply a band-aid and remove after 12 hours.   If the bleeding continues, hold pressure with the middle finger against the puncture site and the thumb against the back of the wrist,call 911 to be transported to the hospital.  DO NOT DRIVE YOURSELF, OR HAVE ANYONE ELSE DRIVE YOU - CALL 911.    ACTIVITY:   For the first 24 hours do not manipulate the wrist.  No lifting, pushing or pulling over 5 pounds with the affected wrist for 7 days and no straining the insertion site. Do not life grocery bags or the garbage can, do not run the vacuum  or  for 7 days.  Start with short walks as in the hospital and gradually increase as tolerated each day.  It is recommended to walk 30 minutes 5-7 days per week.  Follow your physician’s instructions on activity.  Avoid walking outside in extremes of heat or cold.  Walk inside when it is cold and windy or hot and humid.     Things to keep in mind:  No driving for at least 24 hours, or as designated by your physician.  Limit the number of times you go up and down the stairs  Take rests and pace yourself with activity.  Be careful and do not strain with

## 2024-12-12 NOTE — PROCEDURES
PROCEDURE NOTE  Date: 12/12/2024   Name: Domitila Landaverde  YOB: 1940    Procedures        BRIEF PROCEDURE NOTE    Date of Procedure: 12/12/2024   Preoperative Diagnosis: CAD  Postoperative Diagnosis: CAD    Procedure: Left heart cath, coronary angiography, moderate sedation  Interventional Cardiologist: Gabino Bone DO  Assistant: None  Anesthesia: local + IV moderate sedation   I administered moderate sedation throughout this procedure. An independent trained observer pushed medications at my direction, and monitored the patient’s level of consciousness and physiological status throughout.  Estimated Blood Loss: Minimal    Access: right radial artery, 5F  Catheters:  Left coronary:JL 3.5, 5f  Right coronary: JR 4, 5f    Findings:   L Main: Large-caliber vessel, distal left main through the ostium of the LAD with a long segment of 80 to 90% stenosis  LAD: Moderate to large caliber vessel, long segment of severe diffuse disease of approximately 80% involving ostial LAD through mid LAD, diffuse mild disease in distal LAD, small diagonals with severe diffuse disease,   LCx:moderate circumflex that continues as a small OM1 with diffuse mild disease, ostium of the circumflex appears to be free of critical disease that involves the distal left main  RCA: Large-caliber vessel, ostial 30% stenosis, diffuse mild to moderate disease throughout large-caliber vessel, several PDA branches including RV marginal supplying distal aspect of the interventricular septum, moderate to large long posterior lateral branch    LVEDP:  ~15 mmhg        Specimens Removed: None    Implants: Not applicable    Closure Device: radial TR band    See full cath note.    Complications: none      Findings:  1.  Severe coronary artery disease involving distal left main through proximal/mid LAD  2.  Mildly elevated LVEDP    Plan:    Will discuss CABG versus complex high risk PCI with patient.  Angiographically, lesion likely will not

## 2024-12-13 ENCOUNTER — DIRECT ADMIT ORDERS (OUTPATIENT)
Age: 84
End: 2024-12-13

## 2024-12-13 DIAGNOSIS — I25.10 CORONARY ARTERY DISEASE INVOLVING NATIVE HEART, UNSPECIFIED VESSEL OR LESION TYPE, UNSPECIFIED WHETHER ANGINA PRESENT: Primary | ICD-10-CM

## 2024-12-13 RX ORDER — SODIUM CHLORIDE 0.9 % (FLUSH) 0.9 %
5-40 SYRINGE (ML) INJECTION PRN
OUTPATIENT
Start: 2024-12-19

## 2024-12-13 RX ORDER — SODIUM CHLORIDE 9 MG/ML
INJECTION, SOLUTION INTRAVENOUS PRN
OUTPATIENT
Start: 2024-12-19

## 2024-12-13 RX ORDER — SODIUM CHLORIDE 0.9 % (FLUSH) 0.9 %
5-40 SYRINGE (ML) INJECTION EVERY 12 HOURS SCHEDULED
OUTPATIENT
Start: 2024-12-19 | End: 2024-12-19

## 2024-12-16 ENCOUNTER — TELEPHONE (OUTPATIENT)
Age: 84
End: 2024-12-16

## 2024-12-16 NOTE — TELEPHONE ENCOUNTER
Patient called in stating that she is having some questions about her upcoming surgery on 12/19/24.       Patient # ~ [phone]

## 2024-12-17 ENCOUNTER — TELEPHONE (OUTPATIENT)
Age: 84
End: 2024-12-17

## 2024-12-17 NOTE — TELEPHONE ENCOUNTER
Future Appointments   Date Time Provider Department Center   3/3/2025  9:20 AM Beba Thompson, SIMA - NP [facility]     ID verified using two patient identifiers. Patient had some questions about upcoming procedure of stent placement. Writer reviewed the Outpatient patient instruction sheet with patient who stated understanding and thanked this writer for the call.

## 2024-12-17 NOTE — TELEPHONE ENCOUNTER
Future Appointments   Date Time Provider Department Center   3/3/2025  9:20 AM Beba Thompson, APRN - NP [facility]     Writer left a message for a return call if still had questions.

## 2024-12-19 ENCOUNTER — HOSPITAL ENCOUNTER (INPATIENT)
Facility: HOSPITAL | Age: 84
LOS: 1 days | Discharge: HOME | End: 2024-12-20
Attending: STUDENT IN AN ORGANIZED HEALTH CARE EDUCATION/TRAINING PROGRAM | Admitting: STUDENT IN AN ORGANIZED HEALTH CARE EDUCATION/TRAINING PROGRAM
Payer: MEDICARE

## 2024-12-19 DIAGNOSIS — I25.10 CORONARY ARTERY DISEASE INVOLVING NATIVE HEART, UNSPECIFIED VESSEL OR LESION TYPE, UNSPECIFIED WHETHER ANGINA PRESENT: ICD-10-CM

## 2024-12-19 DIAGNOSIS — I25.10 CAD (CORONARY ARTERY DISEASE): ICD-10-CM

## 2024-12-19 PROBLEM — Z98.61 POST PTCA: Status: ACTIVE | Noted: 2024-12-19

## 2024-12-19 LAB
ACT BLD: 170 SECS (ref 79–138)
ACT BLD: 210 SECS (ref 79–138)
ACT BLD: 262 SECS (ref 79–138)
ACT BLD: 291 SECS (ref 79–138)
ACT BLD: 320 SECS (ref 79–138)
ACT BLD: 325 SECS (ref 79–138)
ECHO BSA: 1.54 M2
EKG ATRIAL RATE: 56 BPM
EKG ATRIAL RATE: 61 BPM
EKG DIAGNOSIS: NORMAL
EKG DIAGNOSIS: NORMAL
EKG P AXIS: 51 DEGREES
EKG P AXIS: 9 DEGREES
EKG P-R INTERVAL: 156 MS
EKG P-R INTERVAL: 188 MS
EKG Q-T INTERVAL: 410 MS
EKG Q-T INTERVAL: 422 MS
EKG QRS DURATION: 76 MS
EKG QRS DURATION: 76 MS
EKG QTC CALCULATION (BAZETT): 407 MS
EKG QTC CALCULATION (BAZETT): 412 MS
EKG R AXIS: -2 DEGREES
EKG R AXIS: 2 DEGREES
EKG T AXIS: 0 DEGREES
EKG T AXIS: 7 DEGREES
EKG VENTRICULAR RATE: 56 BPM
EKG VENTRICULAR RATE: 61 BPM
GLUCOSE BLD STRIP.AUTO-MCNC: 146 MG/DL (ref 65–117)
SERVICE CMNT-IMP: ABNORMAL

## 2024-12-19 PROCEDURE — 6360000004 HC RX CONTRAST MEDICATION: Performed by: STUDENT IN AN ORGANIZED HEALTH CARE EDUCATION/TRAINING PROGRAM

## 2024-12-19 PROCEDURE — 6370000000 HC RX 637 (ALT 250 FOR IP): Performed by: NURSE PRACTITIONER

## 2024-12-19 PROCEDURE — 6370000000 HC RX 637 (ALT 250 FOR IP): Performed by: STUDENT IN AN ORGANIZED HEALTH CARE EDUCATION/TRAINING PROGRAM

## 2024-12-19 PROCEDURE — 92978 ENDOLUMINL IVUS OCT C 1ST: CPT | Performed by: STUDENT IN AN ORGANIZED HEALTH CARE EDUCATION/TRAINING PROGRAM

## 2024-12-19 PROCEDURE — 85347 COAGULATION TIME ACTIVATED: CPT

## 2024-12-19 PROCEDURE — 92979 ENDOLUMINL IVUS OCT C EA: CPT | Performed by: STUDENT IN AN ORGANIZED HEALTH CARE EDUCATION/TRAINING PROGRAM

## 2024-12-19 PROCEDURE — G0378 HOSPITAL OBSERVATION PER HR: HCPCS

## 2024-12-19 PROCEDURE — 93005 ELECTROCARDIOGRAM TRACING: CPT | Performed by: STUDENT IN AN ORGANIZED HEALTH CARE EDUCATION/TRAINING PROGRAM

## 2024-12-19 PROCEDURE — 99152 MOD SED SAME PHYS/QHP 5/>YRS: CPT | Performed by: STUDENT IN AN ORGANIZED HEALTH CARE EDUCATION/TRAINING PROGRAM

## 2024-12-19 PROCEDURE — 76937 US GUIDE VASCULAR ACCESS: CPT | Performed by: STUDENT IN AN ORGANIZED HEALTH CARE EDUCATION/TRAINING PROGRAM

## 2024-12-19 PROCEDURE — C1894 INTRO/SHEATH, NON-LASER: HCPCS | Performed by: STUDENT IN AN ORGANIZED HEALTH CARE EDUCATION/TRAINING PROGRAM

## 2024-12-19 PROCEDURE — C9601 PERC DRUG-EL COR STENT BRAN: HCPCS | Performed by: STUDENT IN AN ORGANIZED HEALTH CARE EDUCATION/TRAINING PROGRAM

## 2024-12-19 PROCEDURE — C9602 PERC D-E COR STENT ATHER S: HCPCS | Performed by: STUDENT IN AN ORGANIZED HEALTH CARE EDUCATION/TRAINING PROGRAM

## 2024-12-19 PROCEDURE — 1100000000 HC RM PRIVATE

## 2024-12-19 PROCEDURE — C1761 HC CATH TRANSLUM INTRAVASCULAR LITHOTRIPSY CORONARY: HCPCS | Performed by: STUDENT IN AN ORGANIZED HEALTH CARE EDUCATION/TRAINING PROGRAM

## 2024-12-19 PROCEDURE — 82962 GLUCOSE BLOOD TEST: CPT

## 2024-12-19 PROCEDURE — 027136Z DILATION OF CORONARY ARTERY, TWO ARTERIES WITH THREE DRUG-ELUTING INTRALUMINAL DEVICES, PERCUTANEOUS APPROACH: ICD-10-PCS | Performed by: STUDENT IN AN ORGANIZED HEALTH CARE EDUCATION/TRAINING PROGRAM

## 2024-12-19 PROCEDURE — C1769 GUIDE WIRE: HCPCS | Performed by: STUDENT IN AN ORGANIZED HEALTH CARE EDUCATION/TRAINING PROGRAM

## 2024-12-19 PROCEDURE — 93010 ELECTROCARDIOGRAM REPORT: CPT | Performed by: INTERNAL MEDICINE

## 2024-12-19 PROCEDURE — 2709999900 HC NON-CHARGEABLE SUPPLY: Performed by: STUDENT IN AN ORGANIZED HEALTH CARE EDUCATION/TRAINING PROGRAM

## 2024-12-19 PROCEDURE — C1753 CATH, INTRAVAS ULTRASOUND: HCPCS | Performed by: STUDENT IN AN ORGANIZED HEALTH CARE EDUCATION/TRAINING PROGRAM

## 2024-12-19 PROCEDURE — C1874 STENT, COATED/COV W/DEL SYS: HCPCS | Performed by: STUDENT IN AN ORGANIZED HEALTH CARE EDUCATION/TRAINING PROGRAM

## 2024-12-19 PROCEDURE — 92928 PRQ TCAT PLMT NTRAC ST 1 LES: CPT | Performed by: STUDENT IN AN ORGANIZED HEALTH CARE EDUCATION/TRAINING PROGRAM

## 2024-12-19 PROCEDURE — 92972 PERQ TRLUML CORONRY LITHOTRP: CPT | Performed by: STUDENT IN AN ORGANIZED HEALTH CARE EDUCATION/TRAINING PROGRAM

## 2024-12-19 PROCEDURE — C9600 PERC DRUG-EL COR STENT SING: HCPCS | Performed by: STUDENT IN AN ORGANIZED HEALTH CARE EDUCATION/TRAINING PROGRAM

## 2024-12-19 PROCEDURE — 99153 MOD SED SAME PHYS/QHP EA: CPT | Performed by: STUDENT IN AN ORGANIZED HEALTH CARE EDUCATION/TRAINING PROGRAM

## 2024-12-19 PROCEDURE — C1887 CATHETER, GUIDING: HCPCS | Performed by: STUDENT IN AN ORGANIZED HEALTH CARE EDUCATION/TRAINING PROGRAM

## 2024-12-19 PROCEDURE — C1725 CATH, TRANSLUMIN NON-LASER: HCPCS | Performed by: STUDENT IN AN ORGANIZED HEALTH CARE EDUCATION/TRAINING PROGRAM

## 2024-12-19 PROCEDURE — 85347 COAGULATION TIME ACTIVATED: CPT | Performed by: STUDENT IN AN ORGANIZED HEALTH CARE EDUCATION/TRAINING PROGRAM

## 2024-12-19 PROCEDURE — 02F03ZZ FRAGMENTATION IN CORONARY ARTERY, ONE ARTERY, PERCUTANEOUS APPROACH: ICD-10-PCS | Performed by: STUDENT IN AN ORGANIZED HEALTH CARE EDUCATION/TRAINING PROGRAM

## 2024-12-19 PROCEDURE — B240ZZ3 ULTRASONOGRAPHY OF SINGLE CORONARY ARTERY, INTRAVASCULAR: ICD-10-PCS | Performed by: STUDENT IN AN ORGANIZED HEALTH CARE EDUCATION/TRAINING PROGRAM

## 2024-12-19 PROCEDURE — 6360000002 HC RX W HCPCS: Performed by: STUDENT IN AN ORGANIZED HEALTH CARE EDUCATION/TRAINING PROGRAM

## 2024-12-19 DEVICE — STENT ONYXNG25026UX ONYX 2.50X26RX
Type: IMPLANTABLE DEVICE | Status: FUNCTIONAL
Brand: ONYX FRONTIER™

## 2024-12-19 DEVICE — STENT ONYXNG22526UX ONYX 2.25X26RX
Type: IMPLANTABLE DEVICE | Status: FUNCTIONAL
Brand: ONYX FRONTIER™

## 2024-12-19 DEVICE — STENT ONYXNG30022UX ONYX 3.00X22RX
Type: IMPLANTABLE DEVICE | Status: FUNCTIONAL
Brand: ONYX FRONTIER™

## 2024-12-19 RX ORDER — PREDNISOLONE ACETATE 10 MG/ML
1 SUSPENSION/ DROPS OPHTHALMIC 2 TIMES DAILY
Status: DISCONTINUED | OUTPATIENT
Start: 2024-12-19 | End: 2024-12-20 | Stop reason: HOSPADM

## 2024-12-19 RX ORDER — ARTIFICIAL TEARS 1; 2; 3 MG/ML; MG/ML; MG/ML
1 SOLUTION/ DROPS OPHTHALMIC PRN
Status: DISCONTINUED | OUTPATIENT
Start: 2024-12-19 | End: 2024-12-20 | Stop reason: HOSPADM

## 2024-12-19 RX ORDER — LATANOPROST 50 UG/ML
1 SOLUTION/ DROPS OPHTHALMIC NIGHTLY
Status: DISCONTINUED | OUTPATIENT
Start: 2024-12-19 | End: 2024-12-20 | Stop reason: HOSPADM

## 2024-12-19 RX ORDER — ROSUVASTATIN CALCIUM 10 MG/1
10 TABLET, COATED ORAL NIGHTLY
Status: DISCONTINUED | OUTPATIENT
Start: 2024-12-19 | End: 2024-12-20 | Stop reason: HOSPADM

## 2024-12-19 RX ORDER — ACETAMINOPHEN 325 MG/1
650 TABLET ORAL EVERY 4 HOURS PRN
Status: DISCONTINUED | OUTPATIENT
Start: 2024-12-19 | End: 2024-12-20 | Stop reason: HOSPADM

## 2024-12-19 RX ORDER — SODIUM CHLORIDE 0.9 % (FLUSH) 0.9 %
5-40 SYRINGE (ML) INJECTION EVERY 12 HOURS SCHEDULED
Status: DISCONTINUED | OUTPATIENT
Start: 2024-12-19 | End: 2024-12-19 | Stop reason: HOSPADM

## 2024-12-19 RX ORDER — SODIUM CHLORIDE 9 MG/ML
INJECTION, SOLUTION INTRAVENOUS PRN
Status: DISCONTINUED | OUTPATIENT
Start: 2024-12-19 | End: 2024-12-19 | Stop reason: HOSPADM

## 2024-12-19 RX ORDER — AMLODIPINE BESYLATE 5 MG/1
5 TABLET ORAL DAILY
Status: DISCONTINUED | OUTPATIENT
Start: 2024-12-19 | End: 2024-12-20 | Stop reason: HOSPADM

## 2024-12-19 RX ORDER — DORZOLAMIDE HCL 20 MG/ML
1 SOLUTION/ DROPS OPHTHALMIC 2 TIMES DAILY
Status: DISCONTINUED | OUTPATIENT
Start: 2024-12-19 | End: 2024-12-20 | Stop reason: HOSPADM

## 2024-12-19 RX ORDER — AMLODIPINE BESYLATE 5 MG/1
5 TABLET ORAL DAILY
Status: DISCONTINUED | OUTPATIENT
Start: 2024-12-19 | End: 2024-12-19

## 2024-12-19 RX ORDER — HEPARIN SODIUM 1000 [USP'U]/ML
INJECTION, SOLUTION INTRAVENOUS; SUBCUTANEOUS PRN
Status: DISCONTINUED | OUTPATIENT
Start: 2024-12-19 | End: 2024-12-19 | Stop reason: HOSPADM

## 2024-12-19 RX ORDER — LATANOPROST 50 UG/ML
1 SOLUTION/ DROPS OPHTHALMIC NIGHTLY
COMMUNITY

## 2024-12-19 RX ORDER — CLOPIDOGREL 300 MG/1
TABLET, FILM COATED ORAL PRN
Status: DISCONTINUED | OUTPATIENT
Start: 2024-12-19 | End: 2024-12-19 | Stop reason: HOSPADM

## 2024-12-19 RX ORDER — CARVEDILOL 12.5 MG/1
12.5 TABLET ORAL 2 TIMES DAILY WITH MEALS
Status: DISCONTINUED | OUTPATIENT
Start: 2024-12-19 | End: 2024-12-20 | Stop reason: HOSPADM

## 2024-12-19 RX ORDER — SPIRONOLACTONE 25 MG/1
25 TABLET ORAL DAILY
Status: DISCONTINUED | OUTPATIENT
Start: 2024-12-20 | End: 2024-12-20 | Stop reason: HOSPADM

## 2024-12-19 RX ORDER — ASPIRIN 81 MG/1
81 TABLET, CHEWABLE ORAL DAILY
Status: DISCONTINUED | OUTPATIENT
Start: 2024-12-20 | End: 2024-12-20 | Stop reason: HOSPADM

## 2024-12-19 RX ORDER — SODIUM CHLORIDE 0.9 % (FLUSH) 0.9 %
5-40 SYRINGE (ML) INJECTION PRN
Status: DISCONTINUED | OUTPATIENT
Start: 2024-12-19 | End: 2024-12-19 | Stop reason: HOSPADM

## 2024-12-19 RX ORDER — TIMOLOL MALEATE 5 MG/ML
1 SOLUTION/ DROPS OPHTHALMIC 2 TIMES DAILY
Status: DISCONTINUED | OUTPATIENT
Start: 2024-12-19 | End: 2024-12-20 | Stop reason: HOSPADM

## 2024-12-19 RX ORDER — IOPAMIDOL 755 MG/ML
INJECTION, SOLUTION INTRAVASCULAR PRN
Status: DISCONTINUED | OUTPATIENT
Start: 2024-12-19 | End: 2024-12-19 | Stop reason: HOSPADM

## 2024-12-19 RX ORDER — ASPIRIN 81 MG/1
81 TABLET, CHEWABLE ORAL DAILY
Status: DISCONTINUED | OUTPATIENT
Start: 2024-12-19 | End: 2024-12-19

## 2024-12-19 RX ORDER — CLOPIDOGREL BISULFATE 75 MG/1
75 TABLET ORAL DAILY
Status: DISCONTINUED | OUTPATIENT
Start: 2024-12-19 | End: 2024-12-19

## 2024-12-19 RX ORDER — LIDOCAINE HYDROCHLORIDE 10 MG/ML
INJECTION, SOLUTION INFILTRATION; PERINEURAL PRN
Status: DISCONTINUED | OUTPATIENT
Start: 2024-12-19 | End: 2024-12-19 | Stop reason: HOSPADM

## 2024-12-19 RX ORDER — BRIMONIDINE TARTRATE 2 MG/ML
1 SOLUTION/ DROPS OPHTHALMIC 2 TIMES DAILY
Status: DISCONTINUED | OUTPATIENT
Start: 2024-12-19 | End: 2024-12-20 | Stop reason: HOSPADM

## 2024-12-19 RX ORDER — FENTANYL CITRATE 50 UG/ML
INJECTION, SOLUTION INTRAMUSCULAR; INTRAVENOUS PRN
Status: DISCONTINUED | OUTPATIENT
Start: 2024-12-19 | End: 2024-12-19 | Stop reason: HOSPADM

## 2024-12-19 RX ORDER — SPIRONOLACTONE 25 MG/1
25 TABLET ORAL DAILY
Status: DISCONTINUED | OUTPATIENT
Start: 2024-12-19 | End: 2024-12-19

## 2024-12-19 RX ORDER — MIDAZOLAM HYDROCHLORIDE 1 MG/ML
INJECTION, SOLUTION INTRAMUSCULAR; INTRAVENOUS PRN
Status: DISCONTINUED | OUTPATIENT
Start: 2024-12-19 | End: 2024-12-19 | Stop reason: HOSPADM

## 2024-12-19 RX ORDER — CLOPIDOGREL BISULFATE 75 MG/1
75 TABLET ORAL DAILY
Status: DISCONTINUED | OUTPATIENT
Start: 2024-12-20 | End: 2024-12-20 | Stop reason: HOSPADM

## 2024-12-19 RX ADMIN — ROSUVASTATIN CALCIUM 10 MG: 10 TABLET, FILM COATED ORAL at 21:35

## 2024-12-19 RX ADMIN — BRIMONIDINE TARTRATE 1 DROP: 2 SOLUTION OPHTHALMIC at 21:37

## 2024-12-19 RX ADMIN — LATANOPROST 1 DROP: 50 SOLUTION OPHTHALMIC at 21:38

## 2024-12-19 RX ADMIN — PREDNISOLONE ACETATE 1 DROP: 10 SUSPENSION/ DROPS OPHTHALMIC at 21:37

## 2024-12-19 RX ADMIN — CARVEDILOL 12.5 MG: 12.5 TABLET, FILM COATED ORAL at 17:50

## 2024-12-19 RX ADMIN — DORZOLAMIDE HYDROCHLORIDE 1 DROP: 20 SOLUTION/ DROPS OPHTHALMIC at 21:38

## 2024-12-19 RX ADMIN — TIMOLOL MALEATE 1 DROP: 5 SOLUTION OPHTHALMIC at 21:37

## 2024-12-19 RX ADMIN — AMLODIPINE BESYLATE 5 MG: 5 TABLET ORAL at 21:35

## 2024-12-19 NOTE — PROGRESS NOTES
10:08 AM  TRANSFER - IN REPORT:    Verbal report received from Kat MAY on Domitila Landaverde  being received from cath for routine post-op      Report consisted of patient's Situation, Background, Assessment and   Recommendations(SBAR).     Information from the following report(s) Nurse Handoff Report was reviewed with the receiving nurse.    Opportunity for questions and clarification was provided.      Assessment completed upon patient's arrival to unit and care assumed.     PCI patient meets criteria for outpatient cardiac rehab. [facility] outpatient cardiac rehab referral will be initiated. Educational handouts provided on: PCI procedure, coronary artery disease, coronary artery risk factors, heart healthy eating, and community resources. Reference information on [facility] Outpatient Cardiac Rehab Program also provided. [facility] cardiac rehab staff will contact patent, per telephone call, to assess and schedule program participation

## 2024-12-19 NOTE — PROGRESS NOTES
Patient arrived. ID and allergies verified verbally with patient. Pt voices understanding of procedure to be performed. Consent obtained. Pt prepped for procedure. Pt denies contrast allergy. Patient denies taking any blood thinners.      Plavix and ASA- taken today      8:20 AM  TRANSFER - OUT REPORT:    Verbal report given to Kat Landaverde  being transferred to cath lab for ordered procedure       Report consisted of patient's Situation, Background, Assessment and   Recommendations(SBAR).     Information from the following report(s) Nurse Handoff Report was reviewed with the receiving nurse.           Lines:   Peripheral IV 12/19/24 Right Forearm (Active)        Opportunity for questions and clarification was provided.      Patient transported with:  Registered Nurse      11:20 am      12:50 pm      1:02 pm  Blood aspirated from sheath then 6 fr sheath pulled from right Groin. Quikclot applied. Manual pressure held by Tanika MAY.     1:14 PM  Hemostasis achieved. Site presents as clean dry intact and soft to palpitation. Sand bag applied.     2:30 pm  Patient HOB 30 degrees    3:40 pm  Patient OB 45 degrees    4:15 pm  TRANSFER - OUT REPORT:    Verbal report given to Claudia on Domitila Landaverde  being transferred to cath lab for routine post-op       Report consisted of patient's Situation, Background, Assessment and   Recommendations(SBAR).     Information from the following report(s) Nurse Handoff Report was reviewed with the receiving nurse.           Lines:   Peripheral IV 12/19/24 Right Forearm (Active)        Opportunity for questions and clarification was provided.      Patient transported with:  Registered Nurse    CLPO and PCC nurse at patient bedside to assess patient and site. Dressing clean, dry and intact. Patient off bedrest at 4:15 pm.

## 2024-12-19 NOTE — H&P
History and physical has been reviewed. The patient has been examined. There have been no significant clinical changes since the completion of the originally dated History and Physical.    Risk and benefit of cardiac catheterization/PCI was described in detail to patient.  (risk of vascular access complication with potential surgical intervention for management, stroke, myocardial infarction, emergent open heart surgery and death).  Patient wishes to proceed with coronary angiography with possible intervention.     Labs   Lab Results   Component Value Date/Time     12/03/2024 01:25 PM    K 4.6 12/03/2024 01:25 PM     12/03/2024 01:25 PM    CO2 20 12/03/2024 01:25 PM    BUN 21 12/03/2024 01:25 PM    CREATININE 0.80 12/03/2024 01:25 PM    GLUCOSE 109 12/03/2024 01:25 PM    CALCIUM 10.1 12/03/2024 01:25 PM    LABGLOM 73 12/03/2024 01:25 PM    LABGLOM 88 04/29/2024 02:15 PM    LABGLOM 88 11/17/2022 11:04 AM      Lab Results   Component Value Date    WBC 6.1 12/03/2024    HGB 14.3 12/03/2024    HCT 45.5 12/03/2024    MCV 88 12/03/2024     12/03/2024         ASA 3  Airway 3

## 2024-12-19 NOTE — PROCEDURES
PROCEDURE NOTE  Date: 12/19/2024   Name: Domitila Landaverde  YOB: 1940    Procedures        BRIEF PROCEDURE NOTE    Date of Procedure: 12/19/2024   Preoperative Diagnosis: CAD  Postoperative Diagnosis: CAD    Procedure: moderate sedation, PCI w. Thomas lad, pci w/ thomas left main, ivus lad, ivus left main, ivus lcx, shockwave lad, shockwave left main  Interventional Cardiologist: Gabino Bone DO  Assistant: None  Anesthesia: local + IV moderate sedation   I administered moderate sedation throughout this procedure. An independent trained observer pushed medications at my direction, and monitored the patient’s level of consciousness and physiological status throughout.  Estimated Blood Loss: Minimal    Access: right CFA, 6F  Catheters:  Left coronary:EBU 3.5 6F        PCI:    EBU 3.5 6F guide    Kalen blue lad    Kalen blue lcx    Dilated lad and left main with 2.5 NC balloon.    IVUS LAD left main Lcx    Mid into proximal lad stented with 2.5x26mm emily thomas. Stent deployed at 12 spencer, withdrawn a few mm and post-dilated at high pressure    Left main into proximal lad stented with 3.0x22mm emily thomas    POT left main ostium of lad with 4.0x6mm NC balloon at high pressure    Distal to mid-lad stent there was wire dissection treated with 2.50q63hf emily thomas.    Post-dilated distally with 2.5 nc balloon at high pressure and proximally with 3.25nc balloon high pressure    Ivus shows mla ~ 5mm within mid-lad stent, proximal lad ~7mm2 with distal left main ~ 9mm2.        Specimens Removed: None    Implants: onxy thomas x 3    Closure Device: manual pressure    See full cath note.    Complications: none      Findings:  1. Successful stenting of left main through mid-lad,  mid-lad with wire dissection treated with THOMAS.    [place]  [address] [address], [address]  [place] [address]                                              Office [phone],Fax [phone]

## 2024-12-20 ENCOUNTER — TELEPHONE (OUTPATIENT)
Age: 84
End: 2024-12-20

## 2024-12-20 VITALS
TEMPERATURE: 98.1 F | RESPIRATION RATE: 16 BRPM | HEIGHT: 58 IN | DIASTOLIC BLOOD PRESSURE: 56 MMHG | WEIGHT: 128 LBS | HEART RATE: 73 BPM | OXYGEN SATURATION: 97 % | BODY MASS INDEX: 26.87 KG/M2 | SYSTOLIC BLOOD PRESSURE: 131 MMHG

## 2024-12-20 LAB
ANION GAP SERPL CALC-SCNC: 6 MMOL/L (ref 2–12)
BUN SERPL-MCNC: 13 MG/DL (ref 6–20)
BUN/CREAT SERPL: 20 (ref 12–20)
CALCIUM SERPL-MCNC: 8.9 MG/DL (ref 8.5–10.1)
CHLORIDE SERPL-SCNC: 106 MMOL/L (ref 97–108)
CO2 SERPL-SCNC: 24 MMOL/L (ref 21–32)
CREAT SERPL-MCNC: 0.65 MG/DL (ref 0.55–1.02)
ERYTHROCYTE [DISTWIDTH] IN BLOOD BY AUTOMATED COUNT: 13.8 % (ref 11.5–14.5)
GLUCOSE BLD STRIP.AUTO-MCNC: 183 MG/DL (ref 65–117)
GLUCOSE SERPL-MCNC: 96 MG/DL (ref 65–100)
HCT VFR BLD AUTO: 41.8 % (ref 35–47)
HGB BLD-MCNC: 13.3 G/DL (ref 11.5–16)
MCH RBC QN AUTO: 28 PG (ref 26–34)
MCHC RBC AUTO-ENTMCNC: 31.8 G/DL (ref 30–36.5)
MCV RBC AUTO: 88 FL (ref 80–99)
NRBC # BLD: 0 K/UL (ref 0–0.01)
NRBC BLD-RTO: 0 PER 100 WBC
PLATELET # BLD AUTO: 200 K/UL (ref 150–400)
PMV BLD AUTO: 10.7 FL (ref 8.9–12.9)
POTASSIUM SERPL-SCNC: 4.3 MMOL/L (ref 3.5–5.1)
RBC # BLD AUTO: 4.75 M/UL (ref 3.8–5.2)
SERVICE CMNT-IMP: ABNORMAL
SODIUM SERPL-SCNC: 136 MMOL/L (ref 136–145)
WBC # BLD AUTO: 7.2 K/UL (ref 3.6–11)

## 2024-12-20 PROCEDURE — 6370000000 HC RX 637 (ALT 250 FOR IP): Performed by: NURSE PRACTITIONER

## 2024-12-20 PROCEDURE — 94761 N-INVAS EAR/PLS OXIMETRY MLT: CPT

## 2024-12-20 PROCEDURE — 99238 HOSP IP/OBS DSCHRG MGMT 30/<: CPT | Performed by: STUDENT IN AN ORGANIZED HEALTH CARE EDUCATION/TRAINING PROGRAM

## 2024-12-20 PROCEDURE — G0378 HOSPITAL OBSERVATION PER HR: HCPCS

## 2024-12-20 PROCEDURE — APPSS30 APP SPLIT SHARED TIME 16-30 MINUTES: Performed by: NURSE PRACTITIONER

## 2024-12-20 PROCEDURE — 85027 COMPLETE CBC AUTOMATED: CPT

## 2024-12-20 PROCEDURE — 82962 GLUCOSE BLOOD TEST: CPT

## 2024-12-20 PROCEDURE — 80048 BASIC METABOLIC PNL TOTAL CA: CPT

## 2024-12-20 RX ORDER — CARVEDILOL 12.5 MG/1
12.5 TABLET ORAL 2 TIMES DAILY WITH MEALS
Qty: 180 TABLET | Refills: 3 | Status: SHIPPED | OUTPATIENT
Start: 2024-12-20

## 2024-12-20 RX ADMIN — SPIRONOLACTONE 25 MG: 25 TABLET ORAL at 10:35

## 2024-12-20 RX ADMIN — TIMOLOL MALEATE 1 DROP: 5 SOLUTION OPHTHALMIC at 10:40

## 2024-12-20 RX ADMIN — DORZOLAMIDE HYDROCHLORIDE 1 DROP: 20 SOLUTION/ DROPS OPHTHALMIC at 10:40

## 2024-12-20 RX ADMIN — CLOPIDOGREL BISULFATE 75 MG: 75 TABLET ORAL at 10:35

## 2024-12-20 RX ADMIN — BRIMONIDINE TARTRATE 1 DROP: 2 SOLUTION OPHTHALMIC at 10:39

## 2024-12-20 RX ADMIN — PREDNISOLONE ACETATE 1 DROP: 10 SUSPENSION/ DROPS OPHTHALMIC at 10:41

## 2024-12-20 RX ADMIN — CARVEDILOL 12.5 MG: 12.5 TABLET, FILM COATED ORAL at 10:35

## 2024-12-20 RX ADMIN — ASPIRIN 81 MG: 81 TABLET, CHEWABLE ORAL at 10:35

## 2024-12-20 NOTE — CARDIO/PULMONARY
[facility]- Referral  Chart review completed.  Noted: PCI to LM into LAD (12/19/24)  Spoke with patient, remained in hospital for over night observation following PCI.  PCI patient meets criteria for outpatient cardiac rehab.  [facility] outpatient cardiac rehab referral will be initiated.    Educational handouts reviewed and provided on: PCI procedure, coronary artery disease, coronary artery risk factors, heart healthy eating, and community resources.    The format and benefits of enrolling in a cardiac rehab Phase II program were communicated. Pt reported she has not been exercising regularly.   All questions answered.  Reference information on [facility] Outpatient Cardiac Rehab Program also provided.  Pt indicated she would like to be called after [place] to discuss when to schedule an intake appt. Pt has an appt with cardiology NP on 1/2/25. [facility] cardiac rehab staff will contact patIent, per telephone call, to schedule program participation as requested.

## 2024-12-20 NOTE — PROGRESS NOTES
[facility]                    Cardiology Care Note     []Initial Encounter     [x]Follow-up    Patient Name: Domitila Landaverde - :1940 - MRN:[id number]  Primary Cardiologist: James Teague MD  Consulting Cardiologist: Gabino Bone DO     Reason for encounter: CAD s/p PCI    HPI:       Domitila Landaverde is a 84 y.o. female with PMH significant for CAD, HTN, HLD, ANNALEE on CPAP.     Pt presented for outpatient cath, underwent PCI to LM through mid-LAD. Kept overnight for observation.     Subjective:      Domitila Landaverde reports she feels well.      Assessment and Plan     CAD s/p PCI  - cont DAPT, statin  - cont coreg    2. HTN  - cont current regimen    Ok for d/c home, will follow up with Dr. Teague          ____________________________________________________________    Cardiac testing  24    ECHO (TTE) COMPLETE (PRN CONTRAST/BUBBLE/STRAIN/3D) 2024 11:28 AM (Final)    Interpretation Summary    Left Ventricle: Normal left ventricular systolic function with a visually estimated EF of 50 - 55%. Left ventricle size is normal. Mildly increased wall thickness. Normal wall motion. Diastolic dysfunction present with normal LV EF.    Left Atrium: Left atrium is mildly dilated. Left atrial volume index is normal (16-34 mL/m2).    Right Atrium: Right atrium is dilated.    Image quality is good.    Signed by: Nba Isabel MD on 2024 11:28 AM    23    NM STRESS TEST WITH MYOCARDIAL PERFUSION 2023 12:23 PM (Final)    Narrative  This is a summary report. The complete report is available in the patient's medical record. If you cannot access the medical record, please contact the sending organization for a detailed fax or copy.      Perfusion Comments: LV perfusion is normal.  SSS is just 1.  No significant perfusion defects. No significant ischemia.    Stress Function: Left ventricular function post-stress is normal. Post-stress ejection fraction is 76%.    Signed by: James  STATUS UNKNOWN)  1980    INVASIVE VASCULAR N/A 12/12/2024    Ultrasound guided vascular access performed by Gabino Bone DO at [facility] CARDIAC CATH LAB    OTHER SURGICAL HISTORY      Exercise cardiolite (5/19/14) - walked 3:15 (4.6 METS) - no ischemic EKG changes. Normal MPI (+ breast attenuation). LVEF 70%.      OTHER SURGICAL HISTORY      CT Heart Scan 4/17/14 - CAC Score 534, (80th%).  Prominent lymph nodes.     TOTAL KNEE ARTHROPLASTY      left, 2013    TOTAL KNEE ARTHROPLASTY Right 2019     Social Hx:  reports that she has never smoked. She has never used smokeless tobacco. She reports current alcohol use. She reports that she does not use drugs.  Family Hx: family history includes COPD in her father; Stroke in her mother.  Allergies   Allergen Reactions    Quercus Robur Itching     O[place] TREE, RED EYES, ITCHING    Molds & Smuts Other (See Comments)     Stuff nose    Propoxyphene Nausea Only    Adhesive Tape Rash    Povidone-Iodine Rash          OBJECTIVE:  Wt Readings from Last 3 Encounters:   12/19/24 58.1 kg (128 lb)   12/02/24 58.1 kg (128 lb)   11/14/24 58.2 kg (128 lb 6.4 oz)     I/O last 3 completed shifts:  In: -   Out: 12 [Urine:1; Stool:1; Blood:10]  No intake/output data recorded.    Physical Exam:    Vitals:   Vitals:    12/19/24 1950 12/19/24 2230 12/19/24 2339 12/20/24 0343   BP: 112/68  107/62 132/67   Pulse: 72 67 74 78   Resp: 16  16 18   Temp: 98.2 °F (36.8 °C)  97.5 °F (36.4 °C) 97.9 °F (36.6 °C)   TempSrc: Oral  Oral Oral   SpO2: 96%  97% 100%   Weight:       Height:         Telemetry: normal sinus rhythm    Gen: Well-developed, well-nourished, in no acute distress  Neck: Supple, No JVD, No Carotid Bruit  Resp: No accessory muscle use, Clear breath sounds, No rales or rhonchi  Card: Regular Rate,Rhythm, Normal S1, S2, No murmurs, rubs or gallop. No thrills.   Abd:   Soft, non-tender, non-distended, BS+   MSK: No cyanosis  Skin: No rashes. Groin site C/D/I     Neuro: Moving all four extremities,

## 2024-12-20 NOTE — DISCHARGE INSTRUCTIONS
Patient Discharge Instructions    Domitila Landaverde / [id number] : 1940    Admitted 2024 Discharged: 2024     Take Home Medications     It is important that you take the medication exactly as they are prescribed.   Keep your medication in the bottles provided by the pharmacist and keep a list of the medication names, dosages, and times to be taken in your wallet.   Do not take other medications without consulting your doctor.     BRING ALL OF YOUR MEDICINES TO YOUR OFFICE VISIT with Dr. Teague.     Cardiac Catheterization  Discharge Instructions    Do not drive, operate any machinery, or sign any legal documents for 24 hours after your procedure.  You must have someone to drive you home.    You may take a shower 24 hours after your cardiac catheterization.  Be sure to get the dressing wet and then remove it; gently wash the area with warm soapy water.  Pat dry and leave open to air.  To help prevent infections, be sure to keep the cath site clean and dry.  No lotions, creams, powders, ointments, etc. in the cath site for approximately 1 week.    Do not take a tub bath, get in a hot tub or swimming pool for approximately 5 days or until the cath site is completely healed.      No strenuous activity or heavy lifting over 10 lbs. for 7 days.    Drink plenty of fluids for 24-48 hours after your cath to flush the contrast dye from your kidneys. No alcoholic beverages for 24 hours.  You may resume your previous diet (low fat, low cholesterol) after your cath.      After your cath, some bruising or discomfort is common during the healing process.  Tylenol, 1-2 tablets every 6 hours as needed, is recommended if you experience any discomfort.  If you experience any signs or symptoms of infection such as fever, chills, or poorly healing incision, persistent tenderness or swelling in the groin, redness and/or warmth to the touch, numbness, significant tingling or pain at the groin site or affected extremity,  rash, drainage from the cath site, or if the leg feels tight or swollen, call your physician right away.    If bleeding at the cath site occurs, take a clean gauze pad and apply direct pressure to the groin just above the puncture site.  Call 911 immediately, and continue to apply direct pressure until an ambulance gets to your location.    You may return to work  2  days after your cardiac cath if no groin bleeding.      Information obtained by :  I understand that if any problems occur once I am at home I am to contact my physician.    I understand and acknowledge receipt of the instructions indicated above.                                                                                                                                           R.N.'s Signature                                                                  Date/Time                                                                                                                                              Patient or Representative Signature                                                          Date/Time      SIMA Allen - NP

## 2024-12-20 NOTE — TELEPHONE ENCOUNTER
Refill per VO of Dr. Teague  Last appt: 12/2/2024    Future Appointments   Date Time Provider Department Center   1/2/2025 10:00 AM Beba Thompson, APRN - NP [facility]       Requested Prescriptions     Signed Prescriptions Disp Refills    carvedilol (COREG) 12.5 MG tablet 180 tablet 3     Sig: TAKE 1 TABLET BY MOUTH TWICE DAILY WITH MEALS     Authorizing Provider: VINCENT TEAGUE     Ordering User: MARY MENDEZ

## 2024-12-20 NOTE — PROGRESS NOTES
1300 Reviewed discharge instructions, follow up, symptoms to report, new medications, answered any questions. Patient with no questions, awaiting ride.   1600 Volunteer transport request put in. Pt awaiting ride to car.  YADIRA Wlof

## 2024-12-20 NOTE — CARE COORDINATION
Care Management Initial Assessment  12/20/2024 8:31 AM  If patient is discharged prior to next notation, then this note serves as note for discharge by case management.    Reason for Admission:   CAD (coronary artery disease) [I25.10]  Post PTCA [Z98.61]  CAD in native artery [I25.10]  Procedure(s) (LRB):  Insert stent denis coronary (N/A)  Intravascular ultrasound (N/A)  Ultrasound guided vascular access (N/A)  Intravascular lithotripsy coronary (N/A)  1 Day Post-Op    Patient Admission Status: Observation  Date Admitted to INP: na  RUR: Readmission Risk Score: 10.9    Hospitalization in the last 30 days (Readmission):  No        Advance Care Planning:  Code Status: Full Code  Primary Healthcare Decision Maker:     Advance Directive: has an advanced directive - a copy HAS NOT been provided.     __________________________________________________________________________  Assessment:      12/20/24 0829   Service Assessment   Patient Orientation Alert and Oriented   History Provided By Medical Record   Support Systems Friends/Neighbors   Discharge Planning   Patient expects to be discharged to: House   Services At/After Discharge   Mode of Transport at Discharge Self   Confirm Follow Up Transport Self       Comments: Patient in Obs. No anticipated needs, no CM consult at this time. Please consult CM should any needs arise.  Patient with dc orders, home with family.    Discharge Concerns: []Yes [x]No []Unknown   Describe:    Financial concerns/barriers: []Yes, explain: []No [x]Unknown/Not discussed  __________________________________________________________________________    Insurer:   Active Insurance as of 12/19/2024       Primary Coverage       Payor Plan Insurance Group Employer/Plan Group    MEDICARE MEDICARE PART A AND B        Payor Address Payor Phone Number Payor Fax Number Effective Dates    [address] [address] [phone]  7/1/2006 - None Entered    [facility] [id number]         Subscriber Name Subscriber Birth  Date Member ID       SARINA HINDS 1940 [id number]               Secondary Coverage       Payor Plan Insurance Group Employer/Plan Group    [place] BCBS [place] ANTHEM MEDICARE SUPP [id number]       Payor Address Payor Phone Number Payor Fax Number Effective Dates    [address] [address] [phone]  11/1/2016 - None Entered    [facility] [address]         Subscriber Name Subscriber Birth Date Member ID       SARINA HINDS 1940 [id number]                     PCP: Cecilio Galindo MD   Address: [address] [facility] [address]   Phone number: [phone]    Pharmacy:   [facility] [address] [place] [id number] [facility] - P [phone] - F [phone]  [address] [facility] [address]  Phone: [phone] Fax: [phone]    DC Transport: (P) Self       Transition of care plan:    []Unable to determine at this time. Awaiting clinical progress, and disposition recommendations.    [x] Home. No assistance required.     [] Home. Pt refused recommended services.    [] Home with family assistance as needed, and outpatient follow-up.    [] Home with Outpatient PT and outpatient follow-up   Pt aware of OP appt? []Yes, Provider:   []Not scheduled   Transport provider:     [] Home with outpatient services.    Specify:    [] Home with Home Health   - [place]ont of Choice offered? [] Yes, Preference:   [] NA    []SNF/IPR   -[]Freedom of Choice offered, and preferences given:   []Listing provided and preferences requested   -Status: []Pending []Accepted:    -Auth required: []Yes []No    -Auth initiated date:   -3 midnight stay required: []Yes []No  Date satisfied:     [] LTC:     [] Home with Hospice   - [place] of Choice offered? [] Yes, Preference:   [] NA    [] [facility] information provided.     [] Other:       Maureen Atwood RN  Case Management Department  For questions or concerns, please Perfect[employer]erve

## 2024-12-20 NOTE — TELEPHONE ENCOUNTER
Has coreg prescription been sent in to the pharmacy yet, please clarify with patient at [phone] since pharmacy doesn't have it

## 2024-12-20 NOTE — TELEPHONE ENCOUNTER
Writer left message that RX requested was faxed to pharmacy and if she had any questions that she could c/b.

## 2024-12-23 DIAGNOSIS — Z95.5 STENTED CORONARY ARTERY: Primary | ICD-10-CM

## 2024-12-30 NOTE — PROGRESS NOTES
Primary Cardiologist:  James Teague MD. [facility]          Patient: Domitila Landaverde  : 1940      Today's Date: 2025        HISTORY OF PRESENT ILLNESS:     History of Present Illness:  Feels much better since PCI. No CP, palpitations. Fatigued improved. Improving GARRISON.    PAST MEDICAL HISTORY:     Past Medical History:   Diagnosis Date    Arthritis     CAD (coronary artery disease)     CT Heart Scan 14 - CAC Score 534, (80th%)    Diabetes mellitus (HCC)     Diverticulosis     Dyslipidemia     GERD (gastroesophageal reflux disease)     Glaucoma     HTN (hypertension)     Obesity     Plantar fasciitis     S/P hip replacement     S/P knee replacement     Sleep apnea     on CPAP       Past Surgical History:   Procedure Laterality Date    APPENDECTOMY      Date in question    BLEPHAROPLASTY      Eye lid lift    CARDIAC PROCEDURE N/A 2024    Left heart cath / coronary angiography performed by Gabino Bone DO at [facility] CARDIAC CATH LAB    CARDIAC PROCEDURE N/A 2024    Insert stent denis coronary performed by Gabino Bone DO at [facility] CARDIAC CATH LAB    CARDIAC PROCEDURE N/A 2024    Intravascular ultrasound performed by Gabino Bone DO at [facility] CARDIAC CATH LAB    CARDIAC PROCEDURE N/A 2024    Intravascular lithotripsy coronary performed by Gabino Bone DO at [facility] CARDIAC CATH LAB    CATARACT REMOVAL      CHOLECYSTECTOMY      HYSTERECTOMY (CERVIX STATUS UNKNOWN)      INVASIVE VASCULAR N/A 2024    Ultrasound guided vascular access performed by Gabino Bone DO at [facility] CARDIAC CATH LAB    INVASIVE VASCULAR N/A 2024    Ultrasound guided vascular access performed by Gabino Bone DO at [facility] CARDIAC CATH LAB    OTHER SURGICAL HISTORY      Exercise cardiolite (14) - walked 3:15 (4.6 METS) - no ischemic EKG changes. Normal MPI (+ breast attenuation). LVEF 70%.      OTHER SURGICAL HISTORY      CT Heart Scan 14 - CAC Score 534, (80th%).  Prominent lymph nodes.

## 2025-01-02 ENCOUNTER — OFFICE VISIT (OUTPATIENT)
Age: 85
End: 2025-01-02
Payer: MEDICARE

## 2025-01-02 VITALS
DIASTOLIC BLOOD PRESSURE: 70 MMHG | OXYGEN SATURATION: 97 % | SYSTOLIC BLOOD PRESSURE: 110 MMHG | BODY MASS INDEX: 27.08 KG/M2 | HEART RATE: 75 BPM | HEIGHT: 58 IN | WEIGHT: 129 LBS

## 2025-01-02 DIAGNOSIS — R53.83 OTHER FATIGUE: ICD-10-CM

## 2025-01-02 DIAGNOSIS — E11.9 TYPE 2 DIABETES MELLITUS WITHOUT COMPLICATION, UNSPECIFIED WHETHER LONG TERM INSULIN USE (HCC): ICD-10-CM

## 2025-01-02 DIAGNOSIS — G47.33 OSA (OBSTRUCTIVE SLEEP APNEA): ICD-10-CM

## 2025-01-02 DIAGNOSIS — I25.10 CAD IN NATIVE ARTERY: Primary | ICD-10-CM

## 2025-01-02 DIAGNOSIS — I10 BENIGN HYPERTENSION: ICD-10-CM

## 2025-01-02 DIAGNOSIS — E78.2 MIXED HYPERLIPIDEMIA: ICD-10-CM

## 2025-01-02 PROCEDURE — 99214 OFFICE O/P EST MOD 30 MIN: CPT

## 2025-01-02 NOTE — PROGRESS NOTES
Chief Complaint   Patient presents with    Congestive Heart Failure    Shortness of Breath    Fatigue     Vitals:    01/02/25 1002   BP: 110/70   Site: Left Upper Arm   Position: Sitting   Cuff Size: Medium Adult   Pulse: 75   SpO2: 97%   Weight: 58.5 kg (129 lb)   Height: 1.473 m (4' 10\")      /70 (Site: Left Upper Arm, Position: Sitting, Cuff Size: Medium Adult)   Pulse 75   Ht 1.473 m (4' 10\")   Wt 58.5 kg (129 lb)   SpO2 97%   BMI 26.96 kg/m²

## 2025-01-08 ENCOUNTER — HOSPITAL ENCOUNTER (OUTPATIENT)
Facility: HOSPITAL | Age: 85
Setting detail: RECURRING SERIES
Discharge: HOME | End: 2025-01-11
Payer: MEDICARE

## 2025-01-08 VITALS — WEIGHT: 129 LBS | BODY MASS INDEX: 26.96 KG/M2

## 2025-01-08 PROCEDURE — 93797 PHYS/QHP OP CAR RHAB WO ECG: CPT

## 2025-01-08 PROCEDURE — 93798 PHYS/QHP OP CAR RHAB W/ECG: CPT

## 2025-01-08 ASSESSMENT — EXERCISE STRESS TEST
PEAK_HR: 89
PEAK_BP: 137/64
PEAK_HR: 89
PEAK_RPE: 10
PEAK_BP: 137/64
PEAK_METS: 1.5

## 2025-01-08 ASSESSMENT — PATIENT HEALTH QUESTIONNAIRE - PHQ9
SUM OF ALL RESPONSES TO PHQ QUESTIONS 1-9: 6
9. THOUGHTS THAT YOU WOULD BE BETTER OFF DEAD, OR OF HURTING YOURSELF: NOT AT ALL
SUM OF ALL RESPONSES TO PHQ QUESTIONS 1-9: 6
8. MOVING OR SPEAKING SO SLOWLY THAT OTHER PEOPLE COULD HAVE NOTICED. OR THE OPPOSITE, BEING SO FIGETY OR RESTLESS THAT YOU HAVE BEEN MOVING AROUND A LOT MORE THAN USUAL: NOT AT ALL
3. TROUBLE FALLING OR STAYING ASLEEP: SEVERAL DAYS
5. POOR APPETITE OR OVEREATING: NOT AT ALL
7. TROUBLE CONCENTRATING ON THINGS, SUCH AS READING THE NEWSPAPER OR WATCHING TELEVISION: NOT AT ALL
6. FEELING BAD ABOUT YOURSELF - OR THAT YOU ARE A FAILURE OR HAVE LET YOURSELF OR YOUR FAMILY DOWN: NOT AT ALL
4. FEELING TIRED OR HAVING LITTLE ENERGY: NEARLY EVERY DAY
10. IF YOU CHECKED OFF ANY PROBLEMS, HOW DIFFICULT HAVE THESE PROBLEMS MADE IT FOR YOU TO DO YOUR WORK, TAKE CARE OF THINGS AT HOME, OR GET ALONG WITH OTHER PEOPLE: VERY DIFFICULT
1. LITTLE INTEREST OR PLEASURE IN DOING THINGS: SEVERAL DAYS
SUM OF ALL RESPONSES TO PHQ QUESTIONS 1-9: 6
2. FEELING DOWN, DEPRESSED OR HOPELESS: SEVERAL DAYS
SUM OF ALL RESPONSES TO PHQ QUESTIONS 1-9: 6
SUM OF ALL RESPONSES TO PHQ9 QUESTIONS 1 & 2: 2

## 2025-01-08 NOTE — CARDIO/PULMONARY
INTAKE APPOINTMENT NOTE  2025    NAME: Domitila Landaverde : 1940 AGE: 84 y.o.  GENDER: female    CARDIAC REHAB ADMITTING DIAGNOSIS: Stented coronary artery [Z95.5]    REFERRING PHYSICIAN: Gabino Bone DO    MEDICAL HX:  Past Medical History:   Diagnosis Date    Arthritis     CAD (coronary artery disease)     CT Heart Scan 14 - CAC Score 534, (80th%)    Diabetes mellitus (HCC)     Diverticulosis     Dyslipidemia     GERD (gastroesophageal reflux disease)     Glaucoma     HTN (hypertension)     Obesity     Plantar fasciitis     S/P hip replacement     S/P knee replacement     Sleep apnea     on CPAP       LABS:     No results found for: \"HBA1C\", \"[id number]\"  No results found for: \"CHOL\", \"CHOLPOCT\", \"CHLST\", \"CHOLV\", \"HDL\", \"HDLPOC\", \"HDLC\", \"LDL\", \"LDLC\", \"VLDLC\", \"VLDL\"      ANTHROPOMETRICS:      Ht Readings from Last 1 Encounters:   25 1.473 m (4' 10\")      Wt Readings from Last 1 Encounters:   25 58.5 kg (129 lb)        WAIST: 44       VISIT SUMMARY:    Domitila Landaverde 84 y.o. presented to Cardiac Rehab for program orientation and 6 minute walk test today with a primary diagnosis of Stented coronary artery [Z95.5]. EF is -10 % Cardiac risk factors include dyslipidemia, diabetes mellitus, hypertension.   Patient has never been a smoker.    Domitila Landaverde is a  and she lives alone. Patient was evaluated for depression using the PHQ-9 assessment tool with a result of 6 which is considered mild. The result was discussed with patient.   Patient denied chest pain or SOB during 6 minute walk test and was in SB/SR few PVC's rare PAC's. Patient walked   meters or   feet or 0.06 mi at a speed of 0.7 mph and grade of 0 % for a final MET level of 1.5.   Exercise prescription developed using exercise tolerance results and patient stated goals, to be supplemented with home exercise recommendations. Education manual given to patient and reviewed. Patient will attend cardiac rehab

## 2025-01-10 ENCOUNTER — HOSPITAL ENCOUNTER (OUTPATIENT)
Facility: HOSPITAL | Age: 85
Setting detail: RECURRING SERIES
Discharge: HOME | End: 2025-01-13
Payer: MEDICARE

## 2025-01-10 VITALS — WEIGHT: 128.6 LBS | BODY MASS INDEX: 26.88 KG/M2

## 2025-01-10 PROCEDURE — 93798 PHYS/QHP OP CAR RHAB W/ECG: CPT

## 2025-01-10 ASSESSMENT — EXERCISE STRESS TEST
PEAK_METS: 1.5
PEAK_HR: 91
PEAK_RPE: 11

## 2025-01-15 ENCOUNTER — APPOINTMENT (OUTPATIENT)
Facility: HOSPITAL | Age: 85
End: 2025-01-15
Payer: MEDICARE

## 2025-01-17 ENCOUNTER — HOSPITAL ENCOUNTER (OUTPATIENT)
Facility: HOSPITAL | Age: 85
Setting detail: RECURRING SERIES
Discharge: HOME | End: 2025-01-20
Payer: MEDICARE

## 2025-01-17 VITALS — BODY MASS INDEX: 27.09 KG/M2 | WEIGHT: 129.6 LBS

## 2025-01-17 PROCEDURE — 93798 PHYS/QHP OP CAR RHAB W/ECG: CPT

## 2025-01-17 ASSESSMENT — EXERCISE STRESS TEST
PEAK_METS: 1.5
PEAK_RPE: 11
PEAK_HR: 93

## 2025-01-18 ENCOUNTER — PATIENT MESSAGE (OUTPATIENT)
Age: 85
End: 2025-01-18

## 2025-01-20 NOTE — TELEPHONE ENCOUNTER
PCI 12/19/2024 w Dr. Bone    Pt on ASA, Plavix.    Per JC Thompson, NP: \"  All ok. Needs to stay on meds for AT LEAST 6 months, if they can do those procedures of plavix and ASA then ok now.   \"

## 2025-01-22 ENCOUNTER — HOSPITAL ENCOUNTER (OUTPATIENT)
Facility: HOSPITAL | Age: 85
Setting detail: RECURRING SERIES
Discharge: HOME | End: 2025-01-25
Payer: MEDICARE

## 2025-01-22 VITALS — WEIGHT: 133.2 LBS | BODY MASS INDEX: 27.84 KG/M2

## 2025-01-22 PROCEDURE — 93798 PHYS/QHP OP CAR RHAB W/ECG: CPT

## 2025-01-22 ASSESSMENT — EXERCISE STRESS TEST
PEAK_RPE: 12
PEAK_HR: 100
PEAK_METS: 2.2

## 2025-01-23 ENCOUNTER — HOSPITAL ENCOUNTER (OUTPATIENT)
Facility: HOSPITAL | Age: 85
Setting detail: RECURRING SERIES
Discharge: HOME | End: 2025-01-26
Payer: MEDICARE

## 2025-01-23 VITALS — WEIGHT: 130.6 LBS | BODY MASS INDEX: 27.3 KG/M2

## 2025-01-23 PROCEDURE — 93798 PHYS/QHP OP CAR RHAB W/ECG: CPT

## 2025-01-23 PROCEDURE — 93797 PHYS/QHP OP CAR RHAB WO ECG: CPT

## 2025-01-23 ASSESSMENT — EXERCISE STRESS TEST
PEAK_RPE: 12
PEAK_METS: 2.2
PEAK_HR: 93

## 2025-01-23 NOTE — PROGRESS NOTES
Cardiac Rehab Nutrition Assessment- 1:1 Evaluation  2025      NAME: Domitila Landaverde : 1940 AGE: 84 y.o.  GENDER: female  CARDIAC REHAB ADMITTING DIAGNOSIS: Stented coronary artery [Z95.5]    PROBLEM LIST:  Patient Active Problem List   Diagnosis    HTN (hypertension)    CAD (coronary artery disease)    Hyperlipidemia    Sleep apnea    RBBB    Borderline diabetes mellitus    Dyspnea on exertion    Post PTCA    CAD in native artery         LABS:   Last A1c - 6.4%    Lab Results   Component Value Date     2024    K 4.3 2024     2024    CO2 24 2024    BUN 13 2024    CREATININE 0.65 2024    GLUCOSE 96 2024    CALCIUM 8.9 2024    BILITOT 0.7 2024    ALKPHOS 89 2024    AST 18 2024    ALT 17 2024    LABGLOM 87 2024    GFRAA 99 2019         No results found for: \"CHOL\", \"TRIG\", \"HDL\", \"LDL\", \"VLDL\", \"CHOLHDLRATIO\"      MEDICATIONS/SUPPLEMENTS:   Scheduled Meds:  Continuous Infusions:  PRN Meds:.  Prior to Admission medications    Medication Sig Start Date End Date Taking? Authorizing Provider   carvedilol (COREG) 12.5 MG tablet TAKE 1 TABLET BY MOUTH TWICE DAILY WITH MEALS 24   James Teague MD   latanoprost (XALATAN) 0.005 % ophthalmic solution Place 1 drop into both eyes nightly    ProviderCici MD   clopidogrel (PLAVIX) 75 MG tablet Take 1 tablet by mouth daily 24   Gabino Bone DO   bismuth subsalicylate (PEPTO BISMOL) 262 MG/15ML suspension Take by mouth as needed    Cici Quinonez MD   MULTIPLE VITAMINS-MINERALS ER PO Take 1 tablet by mouth daily    Cici Quinonez MD   trolamine salicylate (ASPERCREME) 10 % cream Apply topically as needed    Cici Quinonez MD   vitamin D (VITAMIN D3) 50 MCG ( UT) CAPS capsule Take 1 capsule by mouth daily    Cici Quinonez MD   psyllium (KONSYL) 28.3 % PACK Take 1 packet by mouth as needed for Constipation

## 2025-01-29 ENCOUNTER — HOSPITAL ENCOUNTER (OUTPATIENT)
Facility: HOSPITAL | Age: 85
Setting detail: RECURRING SERIES
Discharge: HOME | End: 2025-02-01
Payer: MEDICARE

## 2025-01-29 VITALS — WEIGHT: 131.4 LBS | BODY MASS INDEX: 27.46 KG/M2

## 2025-01-29 PROCEDURE — 93798 PHYS/QHP OP CAR RHAB W/ECG: CPT

## 2025-01-29 ASSESSMENT — EXERCISE STRESS TEST
PEAK_METS: 2.2
PEAK_RPE: 12
PEAK_HR: 95

## 2025-01-30 ENCOUNTER — HOSPITAL ENCOUNTER (OUTPATIENT)
Facility: HOSPITAL | Age: 85
Setting detail: RECURRING SERIES
End: 2025-01-30
Payer: MEDICARE

## 2025-01-30 VITALS — WEIGHT: 130.2 LBS | BODY MASS INDEX: 27.21 KG/M2

## 2025-01-30 PROCEDURE — 93798 PHYS/QHP OP CAR RHAB W/ECG: CPT

## 2025-01-30 ASSESSMENT — EXERCISE STRESS TEST
PEAK_HR: 89
PEAK_METS: 2.2
PEAK_RPE: 12

## 2025-02-05 ENCOUNTER — HOSPITAL ENCOUNTER (OUTPATIENT)
Facility: HOSPITAL | Age: 85
Setting detail: RECURRING SERIES
Discharge: HOME | End: 2025-02-08
Payer: MEDICARE

## 2025-02-05 VITALS — WEIGHT: 131.2 LBS | BODY MASS INDEX: 27.42 KG/M2

## 2025-02-05 PROCEDURE — 93797 PHYS/QHP OP CAR RHAB WO ECG: CPT

## 2025-02-05 PROCEDURE — 93798 PHYS/QHP OP CAR RHAB W/ECG: CPT

## 2025-02-05 ASSESSMENT — EXERCISE STRESS TEST
PEAK_HR: 107
PEAK_METS: 2.7
PEAK_RPE: 12

## 2025-02-07 ENCOUNTER — HOSPITAL ENCOUNTER (OUTPATIENT)
Facility: HOSPITAL | Age: 85
Setting detail: RECURRING SERIES
Discharge: HOME | End: 2025-02-10
Payer: MEDICARE

## 2025-02-07 VITALS — BODY MASS INDEX: 27.09 KG/M2 | WEIGHT: 129.6 LBS

## 2025-02-07 PROCEDURE — 93798 PHYS/QHP OP CAR RHAB W/ECG: CPT

## 2025-02-07 ASSESSMENT — EXERCISE STRESS TEST
PEAK_HR: 97
PEAK_METS: 2.7
PEAK_RPE: 12

## 2025-02-13 ENCOUNTER — HOSPITAL ENCOUNTER (OUTPATIENT)
Facility: HOSPITAL | Age: 85
Setting detail: RECURRING SERIES
Discharge: HOME | End: 2025-02-16
Payer: MEDICARE

## 2025-02-13 VITALS — WEIGHT: 131.4 LBS | BODY MASS INDEX: 27.46 KG/M2

## 2025-02-13 PROCEDURE — 93798 PHYS/QHP OP CAR RHAB W/ECG: CPT

## 2025-02-13 ASSESSMENT — EXERCISE STRESS TEST
PEAK_RPE: 12
PEAK_METS: 2.7
PEAK_HR: 98

## 2025-02-14 ENCOUNTER — HOSPITAL ENCOUNTER (OUTPATIENT)
Facility: HOSPITAL | Age: 85
Setting detail: RECURRING SERIES
Discharge: HOME | End: 2025-02-17
Payer: MEDICARE

## 2025-02-14 VITALS — BODY MASS INDEX: 27.67 KG/M2 | WEIGHT: 132.4 LBS

## 2025-02-14 PROCEDURE — 93798 PHYS/QHP OP CAR RHAB W/ECG: CPT

## 2025-02-14 ASSESSMENT — EXERCISE STRESS TEST
PEAK_RPE: 12
PEAK_HR: 103
PEAK_METS: 2.7

## 2025-02-19 ENCOUNTER — APPOINTMENT (OUTPATIENT)
Facility: HOSPITAL | Age: 85
End: 2025-02-19
Payer: MEDICARE

## 2025-02-21 ENCOUNTER — HOSPITAL ENCOUNTER (OUTPATIENT)
Facility: HOSPITAL | Age: 85
Setting detail: RECURRING SERIES
Discharge: HOME | End: 2025-02-24
Payer: MEDICARE

## 2025-02-21 ENCOUNTER — APPOINTMENT (OUTPATIENT)
Facility: HOSPITAL | Age: 85
End: 2025-02-21
Payer: MEDICARE

## 2025-02-21 VITALS — BODY MASS INDEX: 27.46 KG/M2 | WEIGHT: 131.4 LBS

## 2025-02-21 PROCEDURE — 93798 PHYS/QHP OP CAR RHAB W/ECG: CPT

## 2025-02-21 ASSESSMENT — EXERCISE STRESS TEST
PEAK_HR: 112
PEAK_RPE: 12
PEAK_METS: 2.7

## 2025-02-26 ENCOUNTER — HOSPITAL ENCOUNTER (OUTPATIENT)
Facility: HOSPITAL | Age: 85
Setting detail: RECURRING SERIES
Discharge: HOME | End: 2025-03-01
Payer: MEDICARE

## 2025-02-26 VITALS — WEIGHT: 130.2 LBS | BODY MASS INDEX: 27.21 KG/M2

## 2025-02-26 PROCEDURE — 93798 PHYS/QHP OP CAR RHAB W/ECG: CPT

## 2025-02-26 ASSESSMENT — EXERCISE STRESS TEST
PEAK_RPE: 12
PEAK_METS: 2.7
PEAK_HR: 103

## 2025-02-28 ENCOUNTER — HOSPITAL ENCOUNTER (OUTPATIENT)
Facility: HOSPITAL | Age: 85
Setting detail: RECURRING SERIES
End: 2025-02-28
Payer: MEDICARE

## 2025-02-28 VITALS — BODY MASS INDEX: 27.55 KG/M2 | WEIGHT: 131.8 LBS

## 2025-02-28 PROCEDURE — 93798 PHYS/QHP OP CAR RHAB W/ECG: CPT

## 2025-02-28 ASSESSMENT — EXERCISE STRESS TEST
PEAK_METS: 2.7
PEAK_HR: 87
PEAK_RPE: 12

## 2025-03-05 ENCOUNTER — HOSPITAL ENCOUNTER (OUTPATIENT)
Facility: HOSPITAL | Age: 85
Setting detail: RECURRING SERIES
Discharge: HOME | End: 2025-03-08
Payer: MEDICARE

## 2025-03-05 VITALS — WEIGHT: 131.2 LBS | BODY MASS INDEX: 27.42 KG/M2

## 2025-03-05 PROCEDURE — 93798 PHYS/QHP OP CAR RHAB W/ECG: CPT

## 2025-03-05 PROCEDURE — 93797 PHYS/QHP OP CAR RHAB WO ECG: CPT

## 2025-03-05 ASSESSMENT — EXERCISE STRESS TEST
PEAK_RPE: 12
PEAK_HR: 105
PEAK_METS: 2.7

## 2025-03-07 ENCOUNTER — HOSPITAL ENCOUNTER (OUTPATIENT)
Facility: HOSPITAL | Age: 85
Setting detail: RECURRING SERIES
Discharge: HOME | End: 2025-03-10
Payer: MEDICARE

## 2025-03-07 VITALS — BODY MASS INDEX: 27.46 KG/M2 | WEIGHT: 131.4 LBS

## 2025-03-07 PROCEDURE — 93798 PHYS/QHP OP CAR RHAB W/ECG: CPT

## 2025-03-07 ASSESSMENT — EXERCISE STRESS TEST
PEAK_HR: 115
PEAK_METS: 2.7
PEAK_RPE: 12

## 2025-03-12 ENCOUNTER — HOSPITAL ENCOUNTER (OUTPATIENT)
Facility: HOSPITAL | Age: 85
Setting detail: RECURRING SERIES
Discharge: HOME | End: 2025-03-15
Payer: MEDICARE

## 2025-03-12 VITALS — BODY MASS INDEX: 27.21 KG/M2 | WEIGHT: 130.2 LBS

## 2025-03-12 PROCEDURE — 93797 PHYS/QHP OP CAR RHAB WO ECG: CPT

## 2025-03-12 PROCEDURE — 93798 PHYS/QHP OP CAR RHAB W/ECG: CPT

## 2025-03-12 ASSESSMENT — EXERCISE STRESS TEST
PEAK_HR: 105
PEAK_RPE: 12
PEAK_METS: 2.7

## 2025-03-14 ENCOUNTER — HOSPITAL ENCOUNTER (OUTPATIENT)
Facility: HOSPITAL | Age: 85
Setting detail: RECURRING SERIES
Discharge: HOME | End: 2025-03-17
Payer: MEDICARE

## 2025-03-14 VITALS — BODY MASS INDEX: 26.63 KG/M2 | WEIGHT: 127.4 LBS

## 2025-03-14 PROCEDURE — 93798 PHYS/QHP OP CAR RHAB W/ECG: CPT

## 2025-03-14 ASSESSMENT — EXERCISE STRESS TEST
PEAK_METS: 2.7
PEAK_RPE: 12
PEAK_HR: 100

## 2025-03-20 ENCOUNTER — HOSPITAL ENCOUNTER (OUTPATIENT)
Facility: HOSPITAL | Age: 85
Setting detail: RECURRING SERIES
Discharge: HOME | End: 2025-03-23
Payer: MEDICARE

## 2025-03-20 VITALS — BODY MASS INDEX: 26.88 KG/M2 | WEIGHT: 128.6 LBS

## 2025-03-20 PROCEDURE — 93797 PHYS/QHP OP CAR RHAB WO ECG: CPT

## 2025-03-20 PROCEDURE — 93798 PHYS/QHP OP CAR RHAB W/ECG: CPT

## 2025-03-20 ASSESSMENT — EXERCISE STRESS TEST
PEAK_METS: 2.7
PEAK_HR: 91
PEAK_RPE: 12

## 2025-03-21 ENCOUNTER — HOSPITAL ENCOUNTER (OUTPATIENT)
Facility: HOSPITAL | Age: 85
Setting detail: RECURRING SERIES
Discharge: HOME | End: 2025-03-24
Payer: MEDICARE

## 2025-03-21 VITALS — BODY MASS INDEX: 26.84 KG/M2 | WEIGHT: 128.4 LBS

## 2025-03-21 PROCEDURE — 93798 PHYS/QHP OP CAR RHAB W/ECG: CPT

## 2025-03-21 ASSESSMENT — EXERCISE STRESS TEST
PEAK_METS: 2.7
PEAK_RPE: 12
PEAK_HR: 99

## 2025-03-26 ENCOUNTER — APPOINTMENT (OUTPATIENT)
Facility: HOSPITAL | Age: 85
End: 2025-03-26
Payer: MEDICARE

## 2025-03-26 ENCOUNTER — HOSPITAL ENCOUNTER (OUTPATIENT)
Facility: HOSPITAL | Age: 85
Setting detail: RECURRING SERIES
End: 2025-03-26
Payer: MEDICARE

## 2025-03-28 ENCOUNTER — HOSPITAL ENCOUNTER (OUTPATIENT)
Facility: HOSPITAL | Age: 85
Setting detail: RECURRING SERIES
Discharge: HOME | End: 2025-03-31
Payer: MEDICARE

## 2025-03-28 VITALS — BODY MASS INDEX: 26.71 KG/M2 | WEIGHT: 127.8 LBS

## 2025-03-28 PROCEDURE — 93798 PHYS/QHP OP CAR RHAB W/ECG: CPT

## 2025-03-28 ASSESSMENT — EXERCISE STRESS TEST
PEAK_HR: 88
PEAK_RPE: 12
PEAK_METS: 2.7

## 2025-04-02 ENCOUNTER — HOSPITAL ENCOUNTER (OUTPATIENT)
Facility: HOSPITAL | Age: 85
Setting detail: RECURRING SERIES
Discharge: HOME | End: 2025-04-05
Payer: MEDICARE

## 2025-04-02 VITALS — BODY MASS INDEX: 27 KG/M2 | WEIGHT: 129.2 LBS

## 2025-04-02 PROCEDURE — 93798 PHYS/QHP OP CAR RHAB W/ECG: CPT

## 2025-04-02 ASSESSMENT — EXERCISE STRESS TEST
PEAK_RPE: 12
PEAK_HR: 82
PEAK_METS: 2.7

## 2025-04-04 ENCOUNTER — HOSPITAL ENCOUNTER (OUTPATIENT)
Facility: HOSPITAL | Age: 85
Setting detail: RECURRING SERIES
Discharge: HOME | End: 2025-04-07
Payer: MEDICARE

## 2025-04-04 VITALS — BODY MASS INDEX: 26.46 KG/M2 | WEIGHT: 126.6 LBS

## 2025-04-04 PROCEDURE — 93798 PHYS/QHP OP CAR RHAB W/ECG: CPT

## 2025-04-04 ASSESSMENT — EXERCISE STRESS TEST
PEAK_METS: 2.7
PEAK_RPE: 12
PEAK_HR: 85

## 2025-04-08 ENCOUNTER — HOSPITAL ENCOUNTER (OUTPATIENT)
Facility: HOSPITAL | Age: 85
Setting detail: RECURRING SERIES
Discharge: HOME | End: 2025-04-11
Payer: MEDICARE

## 2025-04-08 VITALS — BODY MASS INDEX: 26.33 KG/M2 | WEIGHT: 126 LBS

## 2025-04-08 PROCEDURE — 93798 PHYS/QHP OP CAR RHAB W/ECG: CPT

## 2025-04-08 ASSESSMENT — EXERCISE STRESS TEST
PEAK_METS: 2.7
PEAK_RPE: 12
PEAK_HR: 89

## 2025-04-11 ENCOUNTER — HOSPITAL ENCOUNTER (OUTPATIENT)
Facility: HOSPITAL | Age: 85
Setting detail: RECURRING SERIES
Discharge: HOME | End: 2025-04-14
Payer: MEDICARE

## 2025-04-11 VITALS — WEIGHT: 127.2 LBS | BODY MASS INDEX: 26.58 KG/M2

## 2025-04-11 PROCEDURE — 93798 PHYS/QHP OP CAR RHAB W/ECG: CPT

## 2025-04-11 ASSESSMENT — EXERCISE STRESS TEST
PEAK_METS: 2.7
PEAK_RPE: 12
PEAK_HR: 88

## 2025-04-16 ENCOUNTER — HOSPITAL ENCOUNTER (OUTPATIENT)
Facility: HOSPITAL | Age: 85
Setting detail: RECURRING SERIES
Discharge: HOME | End: 2025-04-19
Payer: MEDICARE

## 2025-04-16 VITALS — WEIGHT: 126.2 LBS | BODY MASS INDEX: 26.38 KG/M2

## 2025-04-16 PROCEDURE — 93798 PHYS/QHP OP CAR RHAB W/ECG: CPT

## 2025-04-16 ASSESSMENT — EXERCISE STRESS TEST
PEAK_HR: 88
PEAK_METS: 2.7
PEAK_RPE: 12

## 2025-04-18 ENCOUNTER — HOSPITAL ENCOUNTER (OUTPATIENT)
Facility: HOSPITAL | Age: 85
Setting detail: RECURRING SERIES
Discharge: HOME | End: 2025-04-21
Payer: MEDICARE

## 2025-04-18 VITALS — BODY MASS INDEX: 26.17 KG/M2 | WEIGHT: 125.2 LBS

## 2025-04-18 PROCEDURE — 93798 PHYS/QHP OP CAR RHAB W/ECG: CPT

## 2025-04-18 ASSESSMENT — EXERCISE STRESS TEST
PEAK_METS: 2.7
PEAK_HR: 86
PEAK_RPE: 12

## 2025-04-23 ENCOUNTER — HOSPITAL ENCOUNTER (OUTPATIENT)
Facility: HOSPITAL | Age: 85
Setting detail: RECURRING SERIES
Discharge: HOME | End: 2025-04-26
Payer: MEDICARE

## 2025-04-23 VITALS — WEIGHT: 124.8 LBS | BODY MASS INDEX: 26.08 KG/M2

## 2025-04-23 PROCEDURE — 93798 PHYS/QHP OP CAR RHAB W/ECG: CPT

## 2025-04-23 ASSESSMENT — EXERCISE STRESS TEST
PEAK_RPE: 12
PEAK_METS: 2.7
PEAK_HR: 89

## 2025-04-25 ENCOUNTER — HOSPITAL ENCOUNTER (OUTPATIENT)
Facility: HOSPITAL | Age: 85
Setting detail: RECURRING SERIES
Discharge: HOME | End: 2025-04-28
Payer: MEDICARE

## 2025-04-25 VITALS — BODY MASS INDEX: 26.29 KG/M2 | WEIGHT: 125.8 LBS

## 2025-04-25 PROCEDURE — 93798 PHYS/QHP OP CAR RHAB W/ECG: CPT

## 2025-04-25 ASSESSMENT — EXERCISE STRESS TEST
PEAK_METS: 2.7
PEAK_HR: 91
PEAK_RPE: 12

## 2025-04-30 ENCOUNTER — HOSPITAL ENCOUNTER (OUTPATIENT)
Facility: HOSPITAL | Age: 85
Setting detail: RECURRING SERIES
Discharge: HOME | End: 2025-05-03
Payer: MEDICARE

## 2025-04-30 VITALS — WEIGHT: 126.2 LBS | BODY MASS INDEX: 26.38 KG/M2

## 2025-04-30 PROCEDURE — 93798 PHYS/QHP OP CAR RHAB W/ECG: CPT

## 2025-04-30 ASSESSMENT — EXERCISE STRESS TEST
PEAK_HR: 89
PEAK_METS: 2.7
PEAK_RPE: 12

## 2025-04-30 ASSESSMENT — PATIENT HEALTH QUESTIONNAIRE - PHQ9
7. TROUBLE CONCENTRATING ON THINGS, SUCH AS READING THE NEWSPAPER OR WATCHING TELEVISION: NOT AT ALL
6. FEELING BAD ABOUT YOURSELF - OR THAT YOU ARE A FAILURE OR HAVE LET YOURSELF OR YOUR FAMILY DOWN: NOT AT ALL
SUM OF ALL RESPONSES TO PHQ QUESTIONS 1-9: 8
4. FEELING TIRED OR HAVING LITTLE ENERGY: NEARLY EVERY DAY
3. TROUBLE FALLING OR STAYING ASLEEP: SEVERAL DAYS
8. MOVING OR SPEAKING SO SLOWLY THAT OTHER PEOPLE COULD HAVE NOTICED. OR THE OPPOSITE, BEING SO FIGETY OR RESTLESS THAT YOU HAVE BEEN MOVING AROUND A LOT MORE THAN USUAL: NOT AT ALL
SUM OF ALL RESPONSES TO PHQ QUESTIONS 1-9: 8
9. THOUGHTS THAT YOU WOULD BE BETTER OFF DEAD, OR OF HURTING YOURSELF: NOT AT ALL
2. FEELING DOWN, DEPRESSED OR HOPELESS: NEARLY EVERY DAY
5. POOR APPETITE OR OVEREATING: SEVERAL DAYS
10. IF YOU CHECKED OFF ANY PROBLEMS, HOW DIFFICULT HAVE THESE PROBLEMS MADE IT FOR YOU TO DO YOUR WORK, TAKE CARE OF THINGS AT HOME, OR GET ALONG WITH OTHER PEOPLE: NOT DIFFICULT AT ALL
1. LITTLE INTEREST OR PLEASURE IN DOING THINGS: NOT AT ALL

## 2025-04-30 NOTE — CARDIO/PULMONARY
DISCHARGE SUMMARY NOTE  2025      NAME: Domitila Landaverde : 1940 AGE: 84 y.o.  GENDER: female    CARDIAC REHAB ADMITTING DIAGNOSIS: Stented coronary artery [Z95.5]    REFERRING PHYSICIAN: Gabino Bone DO    MEDICAL HX:  Past Medical History:   Diagnosis Date    Arthritis     CAD (coronary artery disease)     CT Heart Scan 14 - CAC Score 534, (80th%)    Diabetes mellitus (HCC)     Diverticulosis     Dyslipidemia     GERD (gastroesophageal reflux disease)     Glaucoma     HTN (hypertension)     Obesity     Plantar fasciitis     S/P hip replacement     S/P knee replacement     Sleep apnea     on CPAP       ANTHROPOMETRICS:      Ht Readings from Last 1 Encounters:   25 1.473 m (4' 10\")      Wt Readings from Last 1 Encounters:   25 57.2 kg (126 lb 3.2 oz)        WAIST: 43.5         PROGRAM SUMMARY:    Domitila Landaverde completed 36/36 sessions in the Cardiac Rehab program. She will continue exercising and focus on diet and nutrition at home. She attended 6 classes and is aware of her cardiac risk factors and cardiac medications. Weight loss was 3 lbs. MET level increase from 1.5 to 2.7.       Questions addressed. Discharge plans discussed. Domitila Landaverde verbalized understanding.      Yelena Laura RN

## 2025-05-02 ENCOUNTER — OFFICE VISIT (OUTPATIENT)
Age: 85
End: 2025-05-02
Payer: MEDICARE

## 2025-05-02 VITALS
HEART RATE: 75 BPM | BODY MASS INDEX: 26.03 KG/M2 | SYSTOLIC BLOOD PRESSURE: 110 MMHG | RESPIRATION RATE: 16 BRPM | WEIGHT: 124 LBS | DIASTOLIC BLOOD PRESSURE: 70 MMHG | OXYGEN SATURATION: 98 % | HEIGHT: 58 IN

## 2025-05-02 DIAGNOSIS — I10 PRIMARY HYPERTENSION: ICD-10-CM

## 2025-05-02 DIAGNOSIS — I25.118 CORONARY ARTERY DISEASE OF NATIVE ARTERY OF NATIVE HEART WITH STABLE ANGINA PECTORIS: Primary | ICD-10-CM

## 2025-05-02 DIAGNOSIS — R06.02 SOB (SHORTNESS OF BREATH): ICD-10-CM

## 2025-05-02 PROCEDURE — G8400 PT W/DXA NO RESULTS DOC: HCPCS | Performed by: SPECIALIST

## 2025-05-02 PROCEDURE — 1159F MED LIST DOCD IN RCRD: CPT | Performed by: SPECIALIST

## 2025-05-02 PROCEDURE — 3078F DIAST BP <80 MM HG: CPT | Performed by: SPECIALIST

## 2025-05-02 PROCEDURE — 99214 OFFICE O/P EST MOD 30 MIN: CPT | Performed by: SPECIALIST

## 2025-05-02 PROCEDURE — 3074F SYST BP LT 130 MM HG: CPT | Performed by: SPECIALIST

## 2025-05-02 PROCEDURE — G8427 DOCREV CUR MEDS BY ELIG CLIN: HCPCS | Performed by: SPECIALIST

## 2025-05-02 PROCEDURE — 1036F TOBACCO NON-USER: CPT | Performed by: SPECIALIST

## 2025-05-02 PROCEDURE — 1090F PRES/ABSN URINE INCON ASSESS: CPT | Performed by: SPECIALIST

## 2025-05-02 PROCEDURE — 1126F AMNT PAIN NOTED NONE PRSNT: CPT | Performed by: SPECIALIST

## 2025-05-02 PROCEDURE — G8419 CALC BMI OUT NRM PARAM NOF/U: HCPCS | Performed by: SPECIALIST

## 2025-05-02 PROCEDURE — 1123F ACP DISCUSS/DSCN MKR DOCD: CPT | Performed by: SPECIALIST

## 2025-05-02 NOTE — PROGRESS NOTES
had concerns including Congestive Heart Failure, Hypertension, Shortness of Breath, and Coronary Artery Disease.    Vitals:    05/02/25 0908   BP: 98/70   BP Site: Left Upper Arm   Patient Position: Sitting   Pulse: 75   Resp: 16   SpO2: 98%   Weight: 56.2 kg (124 lb)   Height: 1.473 m (4' 10\")        Chest pain No    Refills No        1. Have you been to the ER, urgent care clinic since your last visit? No       Hospitalized since your last visit? No       Where?        Date?  
    [facility], [facility], ITCHING    [facility] Other (See Comments)     Stuff nose    Propoxyphene Nausea Only    Adhesive Tape Rash    Povidone-Iodine Rash         SOCIAL HISTORY:     Social History     Tobacco Use    Smoking status: Never    Smokeless tobacco: Never   Substance Use Topics    Alcohol use: Not Currently     Alcohol/week: 8.0 standard drinks of alcohol     Types: 8 Glasses of wine per week    Drug use: No         FAMILY HISTORY:     Family History   Problem Relation Age of Onset    Stroke Mother     COPD Father          REVIEW OF SYMPTOMS:     Review of Symptoms:  Constitutional: Negative for fever, chills  HEENT: Negative for nosebleeds, tinnitus, and vision changes.   Respiratory: Negative for cough, wheezing  Cardiovascular: Negative for orthopnea, claudication, syncope  Gastrointestinal: Negative for abdominal pain, diarrhea, melena.   Genitourinary: Negative for dysuria  Musculoskeletal: + joint pain   Skin: Negative for rash  Heme: No problems bleeding.  Neurological: Negative for speech change and focal weakness.         PHYSICAL EXAM:     Physical Exam:  /70 (BP Site: Left Upper Arm, Patient Position: Sitting)   Pulse 75   Resp 16   Ht 1.473 m (4' 10\")   Wt 56.2 kg (124 lb)   SpO2 98%   BMI 25.92 kg/m²     Patient appears generally well, mood and affect are appropriate and pleasant.  HEENT:  Hearing intact, non-icteric, normocephalic, atraumatic.   Neck Exam: Supple, No JVD   Lung Exam: + crackles at right base    Cardiac Exam: Regular rate and rhythm with no murmur or rub  Abdomen: Soft, non-tender  Extremities: Moves all ext well. No lower extremity edema.  MSKTL: Overall good ROM ext  Skin: No significant rashes  Psych: Appropriate affect  Neuro - Grossly intact        LABS / OTHER STUDIES:     Lab Results   Component Value Date     12/20/2024    K 4.3 12/20/2024     12/20/2024    CO2 24 12/20/2024    BUN 13 12/20/2024    CREATININE 0.65 12/20/2024    GLUCOSE 96

## 2025-05-02 NOTE — PATIENT INSTRUCTIONS
Patient Education        Learning About the Mediterranean Diet  What is the Mediterranean diet?     The Mediterranean diet is a style of eating rather than a diet plan. It features foods eaten in [place], [place], southern [place], and other countries along the [place] Sea. It emphasizes eating foods like fish, fruits, vegetables, beans, high-fiber breads and whole grains, nuts, and olive oil. This style of eating includes limited red meat, cheese, and sweets.  Why choose the Mediterranean diet?  A Mediterranean-style diet may improve heart health. It contains more fat than other heart-healthy diets. But the fats are mainly from nuts, unsaturated oils (such as fish oils and olive oil), and certain nut or seed oils (such as canola, soybean, or flaxseed oil). These fats may help protect the heart and blood vessels.  How can you get started on the Mediterranean diet?  Here are some things you can do to switch to a more Mediterranean way of eating.  What to eat  Eat a variety of fruits and vegetables each day, such as grapes, blueberries, tomatoes, broccoli, peppers, figs, olives, spinach, eggplant, beans, lentils, and chickpeas.  Eat a variety of whole-grain foods each day, such as oats, brown rice, and whole wheat bread, pasta, and couscous.  Eat fish at least 2 times a week. Try tuna, salmon, mackerel, lake trout, herring, or sardines.  Eat moderate amounts of low-fat dairy products, such as milk, cheese, or yogurt.  Eat moderate amounts of poultry and eggs.  Choose healthy (unsaturated) fats, such as nuts, olive oil, and certain nut or seed oils like canola, soybean, and flaxseed.  Limit unhealthy (saturated) fats, such as butter, palm oil, and coconut oil. And limit fats found in animal products, such as meat and dairy products made with whole milk. Try to eat red meat only a few times a month in very small amounts.  Limit sweets and desserts to only a few times a week. This includes sugar-sweetened

## 2025-05-15 ENCOUNTER — TELEPHONE (OUTPATIENT)
Age: 85
End: 2025-05-15

## 2025-05-15 NOTE — TELEPHONE ENCOUNTER
Nurse called from [facility] (periodontist) requesting Plavix & ASA hold.  Scheduled for surgery Monday w Dr. Costa.  She had cardiac stent placed in 2024.    LV 5/2/25, \"[facility] 12/19/24 - s/p complex pci of left main and lad. \"    Per KENRICK Thompson, Highly recommend doing procedure on plavix.  Will need to continue.    Provided info via phone.  Will also send clearance via fax.    Fax to [phone]   [phone]  Attn: Mary    Sent per CJ Thompson, NP \"Recommend remaining on DAPT for 1 year s/p PCI 12/19/2024. Dental procedures can be done but needs to stay on DAPT. Pt will have intermediate cardiac risk.\"

## 2025-05-19 ENCOUNTER — PATIENT MESSAGE (OUTPATIENT)
Age: 85
End: 2025-05-19

## 2025-05-19 NOTE — TELEPHONE ENCOUNTER
5/2/25 Dr. Teague OV\"- Follow BP at home to guide regimen --> cont coreg, amlodipine, aldactone --> will cut back meds as needed \"  
no

## 2025-06-25 ENCOUNTER — TELEPHONE (OUTPATIENT)
Age: 85
End: 2025-06-25

## 2025-06-25 RX ORDER — CLOPIDOGREL BISULFATE 75 MG/1
75 TABLET ORAL DAILY
Qty: 90 TABLET | Refills: 1 | OUTPATIENT
Start: 2025-06-25

## 2025-06-25 NOTE — TELEPHONE ENCOUNTER
Refill per VO of Dr. Teague  Last appt: 5/2/2025    Future Appointments   Date Time Provider Department Center   8/7/2025  9:00 AM Jodie Alcazar, APRN - NP [facility]       Requested Prescriptions     Signed Prescriptions Disp Refills    clopidogrel (PLAVIX) 75 MG tablet 90 tablet 1     Sig: Take 1 tablet by mouth daily OK to stop after 12/19/2025.     Authorizing Provider: VINCENT TEAGUE     Ordering User: NATHEN ROJAS     \"- [facility] 12/19/24 - s/p complex pci of left main and lad.  - She felt a little better after her stents but still weak since then   - Cont DAPT for 1 year \"

## 2025-06-25 NOTE — TELEPHONE ENCOUNTER
Patient called to get a refill on clopidogrel 75mg. Patient completely out.    Pharmacy # [phone]

## 2025-07-01 RX ORDER — CLOPIDOGREL BISULFATE 75 MG/1
75 TABLET ORAL DAILY
Qty: 90 TABLET | Refills: 1 | OUTPATIENT
Start: 2025-07-01

## 2025-07-01 NOTE — TELEPHONE ENCOUNTER
Refill sent 6/25/25 to confirmed pharmacy.    Called [facility] pharmacy,  They didn't show rx.  Sent again while on phone; didn't receive.  Provided VO.    Called pt,  Let her know that it was being taken care of.

## 2025-08-07 ENCOUNTER — OFFICE VISIT (OUTPATIENT)
Age: 85
End: 2025-08-07
Payer: MEDICARE

## 2025-08-07 VITALS
OXYGEN SATURATION: 97 % | BODY MASS INDEX: 27.08 KG/M2 | HEART RATE: 80 BPM | SYSTOLIC BLOOD PRESSURE: 132 MMHG | HEIGHT: 58 IN | DIASTOLIC BLOOD PRESSURE: 76 MMHG | WEIGHT: 129 LBS

## 2025-08-07 DIAGNOSIS — R06.02 SOB (SHORTNESS OF BREATH): ICD-10-CM

## 2025-08-07 DIAGNOSIS — I25.118 CORONARY ARTERY DISEASE OF NATIVE ARTERY OF NATIVE HEART WITH STABLE ANGINA PECTORIS: Primary | ICD-10-CM

## 2025-08-07 DIAGNOSIS — R06.09 DYSPNEA ON EXERTION: ICD-10-CM

## 2025-08-07 DIAGNOSIS — E78.2 MIXED HYPERLIPIDEMIA: ICD-10-CM

## 2025-08-07 DIAGNOSIS — G47.33 OSA (OBSTRUCTIVE SLEEP APNEA): ICD-10-CM

## 2025-08-07 DIAGNOSIS — I10 PRIMARY HYPERTENSION: ICD-10-CM

## 2025-08-07 PROCEDURE — 1160F RVW MEDS BY RX/DR IN RCRD: CPT

## 2025-08-07 PROCEDURE — 1123F ACP DISCUSS/DSCN MKR DOCD: CPT

## 2025-08-07 PROCEDURE — 1036F TOBACCO NON-USER: CPT

## 2025-08-07 PROCEDURE — 3078F DIAST BP <80 MM HG: CPT

## 2025-08-07 PROCEDURE — 99214 OFFICE O/P EST MOD 30 MIN: CPT

## 2025-08-07 PROCEDURE — 1126F AMNT PAIN NOTED NONE PRSNT: CPT

## 2025-08-07 PROCEDURE — G8419 CALC BMI OUT NRM PARAM NOF/U: HCPCS

## 2025-08-07 PROCEDURE — 3075F SYST BP GE 130 - 139MM HG: CPT

## 2025-08-07 PROCEDURE — 1090F PRES/ABSN URINE INCON ASSESS: CPT

## 2025-08-07 PROCEDURE — 1159F MED LIST DOCD IN RCRD: CPT

## 2025-08-07 PROCEDURE — G8400 PT W/DXA NO RESULTS DOC: HCPCS

## 2025-08-07 PROCEDURE — G8427 DOCREV CUR MEDS BY ELIG CLIN: HCPCS

## 2025-08-19 ENCOUNTER — TRANSCRIBE ORDERS (OUTPATIENT)
Facility: HOSPITAL | Age: 85
End: 2025-08-19

## 2025-08-19 DIAGNOSIS — I65.23 BILATERAL CAROTID ARTERY STENOSIS: Primary | ICD-10-CM

## 2025-08-25 ENCOUNTER — HOSPITAL ENCOUNTER (OUTPATIENT)
Facility: HOSPITAL | Age: 85
Discharge: HOME | End: 2025-08-27
Payer: MEDICARE

## 2025-08-25 DIAGNOSIS — I65.23 BILATERAL CAROTID ARTERY STENOSIS: ICD-10-CM

## 2025-08-25 PROCEDURE — 93880 EXTRACRANIAL BILAT STUDY: CPT

## 2025-08-26 LAB
VAS LEFT CCA DIST EDV: 10.9 CM/S
VAS LEFT CCA DIST PSV: 62.9 CM/S
VAS LEFT CCA PROX EDV: 12.6 CM/S
VAS LEFT CCA PROX PSV: 79.6 CM/S
VAS LEFT ECA EDV: 7.8 CM/S
VAS LEFT ECA PSV: 85.3 CM/S
VAS LEFT ICA DIST EDV: 16 CM/S
VAS LEFT ICA DIST PSV: 53.5 CM/S
VAS LEFT ICA MID EDV: 16 CM/S
VAS LEFT ICA MID PSV: 68.3 CM/S
VAS LEFT ICA PROX EDV: 9.9 CM/S
VAS LEFT ICA PROX PSV: 56.1 CM/S
VAS LEFT ICA/CCA PSV: 1.1 NO UNITS
VAS LEFT VERTEBRAL EDV: 11.7 CM/S
VAS LEFT VERTEBRAL PSV: 36.9 CM/S
VAS RIGHT CCA DIST EDV: 11.6 CM/S
VAS RIGHT CCA DIST PSV: 51.7 CM/S
VAS RIGHT CCA PROX EDV: 8.8 CM/S
VAS RIGHT CCA PROX PSV: 81.5 CM/S
VAS RIGHT ECA EDV: 9.7 CM/S
VAS RIGHT ECA PSV: 137.5 CM/S
VAS RIGHT ICA DIST EDV: 11.3 CM/S
VAS RIGHT ICA DIST PSV: 51.4 CM/S
VAS RIGHT ICA MID EDV: 11.6 CM/S
VAS RIGHT ICA MID PSV: 63.9 CM/S
VAS RIGHT ICA PROX EDV: 10.7 CM/S
VAS RIGHT ICA PROX PSV: 70.2 CM/S
VAS RIGHT ICA/CCA PSV: 1.4 NO UNITS
VAS RIGHT VERTEBRAL EDV: 5.2 CM/S
VAS RIGHT VERTEBRAL PSV: 25.8 CM/S

## 2025-08-28 RX ORDER — ROSUVASTATIN CALCIUM 10 MG/1
TABLET, COATED ORAL NIGHTLY
Qty: 90 TABLET | Refills: 3 | Status: SHIPPED | OUTPATIENT
Start: 2025-08-28

## 2025-09-02 ENCOUNTER — TELEPHONE (OUTPATIENT)
Age: 85
End: 2025-09-02

## (undated) DEVICE — GLIDESHEATH SLENDER STAINLESS STEEL KIT: Brand: GLIDESHEATH SLENDER

## (undated) DEVICE — CATH BLLN ANGIO 2.50X27MM NC EUPHORIA RX

## (undated) DEVICE — CATH BLLN ANGIO 2.50X20MM NC EUPHORIA RX

## (undated) DEVICE — COPILOT BLEEDBACK CONTROL VALVE: Brand: COPILOT

## (undated) DEVICE — DEVICE INFL 20ML 30ATM DGT FLD DISPNS SYR W ACCESSPLUS BLU

## (undated) DEVICE — ARGO BAGZ FLUID MANAGEMENT SYSTEM: Brand: ARGO BAGZ FLUID MANAGEMENT SYSTEM

## (undated) DEVICE — SUTURE PERMAHAND SZ 2-0 L18IN NONABSORBABLE BLK L26MM PS 1588H

## (undated) DEVICE — RADIFOCUS OPTITORQUE ANGIOGRAPHIC CATHETER: Brand: OPTITORQUE

## (undated) DEVICE — CATH BLLN ANGIO 4X6MM NC EUPHORIA RX

## (undated) DEVICE — TUBING PRSS MON L12IN PVC RIG NONEXPANDING M TO FEM CONN

## (undated) DEVICE — TR BAND RADIAL ARTERY COMPRESSION DEVICE: Brand: TR BAND

## (undated) DEVICE — MICROPUNCTURE INTRODUCER SET SILHOUETTE TRANSITIONLESS PUSH-PLUS DESIGN - STIFFENED CANNULA WITH STAINLESS STEEL WIRE GUIDE: Brand: MICROPUNCTURE

## (undated) DEVICE — MEDI-TRACE CADENCE ADULT, DEFIBRILLATION ELECTRODE -RTS  (10 PR/PK) - PHYSIO-CONTROL: Brand: MEDI-TRACE CADENCE

## (undated) DEVICE — Device: Brand: ASAHI SION BLUE

## (undated) DEVICE — HEART CATH-SFMC: Brand: MEDLINE INDUSTRIES, INC.

## (undated) DEVICE — PINNACLE INTRODUCER SHEATH: Brand: PINNACLE

## (undated) DEVICE — KENDALL DL ECG DUAL CONNECT RADIOLUCENT LEAD WIRES, 5-LEAD, SINGLE PATIENT USE: Brand: KENDALL

## (undated) DEVICE — Device: Brand: EAGLE EYE PLATINUM RX DIGITAL IVUS CATHETER

## (undated) DEVICE — HI-TORQUE VERSACORE MODIFIED J GUIDE WIRE SYSTEM 145 CM: Brand: HI-TORQUE VERSACORE

## (undated) DEVICE — SUPPORT WRST AD W3.5XL9IN DIA14.5IN ART SFT ADJ HK AND LOOP

## (undated) DEVICE — CATHETER GUID 6FR L100CM DIA0.071IN NYL SHFT EBU3.5

## (undated) DEVICE — TOWEL SURG W17XL27IN STD BLU COT NONFENESTRATED PREWASHED

## (undated) DEVICE — CONTAINER,SPECIMEN,3OZ,OR STRL: Brand: MEDLINE

## (undated) DEVICE — CATHETER IVL C2PLUS SHOCKWAVE 3.0MM X 12MM

## (undated) DEVICE — GUIDEWIRE VASC L180CM DIA0.035IN 3MM PTFE J TIP EXCHG FIX

## (undated) DEVICE — DEVICE TORQ FLRESCNT PNK FOR HEMSTAS VLV

## (undated) DEVICE — INTENDED FOR TISSUE SEPARATION, AND OTHER PROCEDURES THAT REQUIRE A SHARP SURGICAL BLADE TO PUNCTURE OR CUT.: Brand: BARD-PARKER ®  SAFETY SCALPED

## (undated) DEVICE — COVER US PRB W15XL120CM W/ GEL RUBBERBAND TAPE STRP FLD GEN

## (undated) DEVICE — CATHETER PTCA L138CM BLLN L12MM DIA3MM 0.026IN 18ATM

## (undated) DEVICE — CATH BLLN ANGIO 3.25X20MM NC EUPHORIA RX